# Patient Record
Sex: MALE | Race: WHITE | Employment: OTHER | URBAN - METROPOLITAN AREA
[De-identification: names, ages, dates, MRNs, and addresses within clinical notes are randomized per-mention and may not be internally consistent; named-entity substitution may affect disease eponyms.]

---

## 2017-12-27 ENCOUNTER — HOSPITAL ENCOUNTER (EMERGENCY)
Age: 52
Discharge: HOME | End: 2017-12-27
Attending: EMERGENCY MEDICINE
Payer: MEDICARE

## 2017-12-27 ENCOUNTER — APPOINTMENT (OUTPATIENT)
Dept: GENERAL RADIOLOGY | Age: 52
End: 2017-12-27
Payer: MEDICARE

## 2017-12-27 VITALS
OXYGEN SATURATION: 92 % | SYSTOLIC BLOOD PRESSURE: 137 MMHG | WEIGHT: 160 LBS | HEART RATE: 79 BPM | BODY MASS INDEX: 24.25 KG/M2 | HEIGHT: 68 IN | RESPIRATION RATE: 16 BRPM | TEMPERATURE: 96.8 F | DIASTOLIC BLOOD PRESSURE: 79 MMHG
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
## 2017-12-27 DIAGNOSIS — S20.212A CONTUSION OF LEFT CHEST WALL, INITIAL ENCOUNTER: ICD-10-CM

## 2017-12-27 PROCEDURE — 6370000000 HC RX 637 (ALT 250 FOR IP): Performed by: EMERGENCY MEDICINE

## 2017-12-27 PROCEDURE — 99283 EMERGENCY DEPT VISIT LOW MDM: CPT

## 2017-12-27 PROCEDURE — 71010 XR CHEST PORTABLE: CPT

## 2017-12-27 RX ORDER — ACETAMINOPHEN 500 MG
1000 TABLET ORAL ONCE
Status: COMPLETED | OUTPATIENT
Start: 2017-12-27 | End: 2017-12-27

## 2017-12-27 RX ORDER — ACETAMINOPHEN 500 MG
1000 TABLET ORAL EVERY 8 HOURS PRN
Qty: 20 TABLET | Refills: 0 | Status: SHIPPED | OUTPATIENT
Start: 2017-12-27 | End: 2022-03-17 | Stop reason: SDUPTHER

## 2017-12-27 RX ADMIN — ACETAMINOPHEN 1000 MG: 500 TABLET, FILM COATED ORAL at 15:08

## 2017-12-27 ASSESSMENT — ENCOUNTER SYMPTOMS
PHOTOPHOBIA: 0
NAUSEA: 0
SHORTNESS OF BREATH: 0
EYE PAIN: 0
RHINORRHEA: 0
SORE THROAT: 0
DIARRHEA: 0
BLOOD IN STOOL: 0
ABDOMINAL PAIN: 0
SINUS PRESSURE: 0
VOMITING: 0
COUGH: 0

## 2017-12-27 ASSESSMENT — PAIN DESCRIPTION - ORIENTATION: ORIENTATION: LEFT

## 2017-12-27 ASSESSMENT — PAIN SCALES - GENERAL: PAINLEVEL_OUTOF10: 5

## 2017-12-27 ASSESSMENT — PAIN DESCRIPTION - LOCATION: LOCATION: CHEST;ABDOMEN

## 2017-12-27 NOTE — ED PROVIDER NOTES
[facility]  ED  eMERGENCY dEPARTMENT eNCOUnter   Attending Attestation     Pt Name: Paulo Oleary  MRN: [id number]  [place] 1965  Date of evaluation: 12/27/17       Paulo Oleary is a 45 y.o. male who presents with Fall (Pt in custody of [employer] and fell against the step bumper while getting into a police vehicle.)      History: pt slipped getting in a police [facility] and fell forward on his chest while handcuffed. Pt complains of left chest pain and diffuse abdominal pain. Pt states pain is most severe over the left chest wall. Exam:   Physical Exam   Constitutional: He is oriented to person, place, and time. No distress. HENT:   Head: Normocephalic and atraumatic. Left Ear: External ear normal.   Eyes: EOM are normal. Pupils are equal, round, and reactive to light. Neck: Normal range of motion. Pulmonary/Chest: Breath sounds normal. No respiratory distress. He has no wheezes. He has no rales. He exhibits no tenderness. Abdominal: Soft. Bowel sounds are normal. He exhibits no distension and no mass. There is no tenderness. There is no rebound and no guarding. Patient complains of abdominal pain after I palpated his abdomen and elicit no grimace or wincing or any rebound. Neurological: He is alert and oriented to person, place, and time. GCS score is 15. Skin: Skin is warm and dry. He is not diaphoretic. Will get chest xray for rib fracture and PNX. If negative will discharge to care home With police. I performed a history and physical examination of the patient and discussed management with the resident. I reviewed the residents note and agree with the documented findings and plan of care. Any areas of disagreement are noted on the chart. I was personally present for the key portions of any procedures. I have documented in the chart those procedures where I was not present during the key portions. I have personally reviewed all images and agree with the resident's interpretation.  I
Patient accepted at shift change. The patient's ED course and anticipated disposition was discussed. Relevant labs and other studies were reviewed. Pending diagnostic and therapeutic workup was discussed. Xr Chest Portable    Result Date: 12/27/2017  EXAMINATION: SINGLE VIEW OF THE CHEST 12/27/2017 3:00 pm COMPARISON: 11/08/2015 HISTORY: ORDERING SYSTEM PROVIDED HISTORY: Left sided chest pain after fall, land on flat metal bar, rib pan anteriolaterally. TECHNOLOGIST PROVIDED HISTORY: Reason for exam:->Left sided chest pain after fall, land on flat metal bar, rib pan anteriolaterally. FINDINGS: Cardiothoracic ratio is normal.  There is no pulmonary consolidation, edema, effusion or pneumothorax. Right apical pleural thickening. Prominent central pulmonary arteries. No acute osseous abnormality. Old fracture of posterolateral assessment of a left 8th rib. No acute cardiopulmonary disease.         Renate Mejias MD  12/27/17 5798
products    Home Medications:  Prior to Admission medications    Medication Sig Start Date End Date Taking? Authorizing Provider   acetaminophen (TYLENOL) 500 MG tablet Take 2 tablets by mouth every 8 hours as needed for Pain 12/27/17  Yes Jeremi Powell MD   pravastatin (PRAVACHOL) 40 MG tablet Take 40 mg by mouth daily    Historical Provider, MD   loratadine (CLARITIN) 10 MG tablet Take 10 mg by mouth daily    Historical Provider, MD   sulfamethoxazole-trimethoprim (BACTRIM DS;SEPTRA DS) 800-160 MG per tablet Take 1 tablet by mouth 2 times daily    Historical Provider, MD   Misc. Devices MISC Rx: Please dispense one Cane. 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 Difficulty ambulating  Sig: Use cane as needed to assist in ambulating. 11/9/15   TRISTAN Torres. Devices MISC Please dispense a 30 day supply of the following:  4x4 gauze, Kerlix rolls, and paper tape. 11/9/15   [place] DPM   polyethylene glycol (GLYCOLAX) packet Take 17 g by mouth daily as needed. Historical Provider, MD   senna (SENOKOT) 8.6 MG tablet Take 1 tablet by mouth nightly. Historical Provider, MD   silver sulfADIAZINE (SILVADENE) 1 % cream Apply topically daily. 2/26/13   César Aguirre MD   Gauze Pads & Dressings ([facility]) [address] [facility] by Does not apply route. 2/26/13   César Aguirre MD   doxycycline (VIBRAMYCIN) 100 MG capsule Take 100 mg by mouth 2 times daily. Historical Provider, MD   metoprolol (LOPRESSOR) 25 MG tablet Take 25 mg by mouth 2 times daily. Historical Provider, MD   clopidogrel (PLAVIX) 75 MG tablet Take 75 mg by mouth daily. Historical Provider, MD   aspirin 81 MG tablet Take 81 mg by mouth daily. Historical Provider, MD   oxyCODONE-acetaminophen (PERCOCET) 5-325 MG per tablet Take 1 tablet by mouth. Historical Provider, MD   folic acid (FOLVITE) 1 MG tablet Take 1 mg by mouth daily.     Historical Provider, MD   multivitamin [facility]) per tablet Take 1

## 2017-12-27 NOTE — ED NOTES
Pt fell down landed on left side and chest. Pt was getting arrested. Pt had no LOC. No neck pain. No trauma. Mild pain. Pt show sno distress at bedside.       Zora Ng RN  12/27/17 1527

## 2019-10-19 ENCOUNTER — HOSPITAL ENCOUNTER (EMERGENCY)
Age: 54
Discharge: HOME | End: 2019-10-20
Attending: EMERGENCY MEDICINE
Payer: COMMERCIAL

## 2019-10-19 VITALS
BODY MASS INDEX: 28.25 KG/M2 | TEMPERATURE: 97.2 F | HEART RATE: 65 BPM | RESPIRATION RATE: 16 BRPM | WEIGHT: 180 LBS | OXYGEN SATURATION: 95 % | HEIGHT: 67 IN | DIASTOLIC BLOOD PRESSURE: 88 MMHG | SYSTOLIC BLOOD PRESSURE: 150 MMHG

## 2019-10-19 DIAGNOSIS — F10.920 ALCOHOLIC INTOXICATION WITHOUT COMPLICATION (HCC): Primary | ICD-10-CM

## 2019-10-19 PROCEDURE — 99284 EMERGENCY DEPT VISIT MOD MDM: CPT

## 2019-10-19 ASSESSMENT — ENCOUNTER SYMPTOMS
TROUBLE SWALLOWING: 0
SHORTNESS OF BREATH: 0
COUGH: 0
ABDOMINAL PAIN: 0

## 2020-10-02 ENCOUNTER — NURSE TRIAGE (OUTPATIENT)
Dept: OTHER | Facility: CLINIC | Age: 55
End: 2020-10-02

## 2020-10-02 NOTE — TELEPHONE ENCOUNTER
Reason for Disposition   Swollen tongue or difficulty swallowing    Answer Assessment - Initial Assessment Questions  1. TYPE: \"What type of sting was it? \" (bee, yellow jacket, etc.)       Bee stung him on his tongue while drinking his coffee    2. ONSET: \"When did it occur? \"   Happened about 30 minutes ago    3. LOCATION: \"Where is the sting located? \"  \"How many stings? \"  Stung on tongue    4. SWELLING SIZE: \"How big is the swelling? \" (e.g., inches or cm)  Starting to swell, different when talking    5. REDNESS: \"Is the area red or pink? \" If so, ask \"What size is area of redness? \" (e.g., inches or cm). \"When did the redness start?\"      6. PAIN: \"Is there any pain? \" If so, ask: \"How bad is it? \"  (Scale 1-10; or mild, moderate, severe)  No pain    7. ITCHING: \"Is there any itching? \" If so, ask: \"How bad is it? \"   No ithcing    8. RESPIRATORY DISTRESS: \"Describe your breathing. \"  No breathing concerns    9. PRIOR REACTIONS: \"Have you had any severe allergic reactions to stings in the past?\" if yes, ask: \"What happened? \"    No allergies to bee stings    10. OTHER SYMPTOMS: \"Do you have any other symptoms? \" (e.g., abdominal pain, face or tongue swelling, new rash elsewhere, vomiting)  No hives or rashes    11. PREGNANCY: \"Is there any chance you are pregnant? \" \"When was your last menstrual period? \"  na    Protocols used: BEE OR YELLOW JACKET STING-ADULT-OH  Caller stating he just took a drink of his coffee and got stung by a bee on his tongue. Tongue is starting to swell. He states he feels liek he is talking slightly different. No allergies to bees. Recommend pt call 911.

## 2020-12-10 ENCOUNTER — HOSPITAL ENCOUNTER (EMERGENCY)
Age: 55
Discharge: HOME | End: 2020-12-11
Attending: EMERGENCY MEDICINE
Payer: MEDICARE

## 2020-12-10 ENCOUNTER — APPOINTMENT (OUTPATIENT)
Dept: CT IMAGING | Age: 55
End: 2020-12-10
Payer: MEDICARE

## 2020-12-10 VITALS
DIASTOLIC BLOOD PRESSURE: 95 MMHG | TEMPERATURE: 97.3 F | RESPIRATION RATE: 18 BRPM | HEART RATE: 99 BPM | SYSTOLIC BLOOD PRESSURE: 160 MMHG | OXYGEN SATURATION: 96 %

## 2020-12-10 LAB
ABSOLUTE EOS #: 0.78 K/UL (ref 0–0.44)
ABSOLUTE IMMATURE GRANULOCYTE: 0.04 K/UL (ref 0–0.3)
ABSOLUTE LYMPH #: 3.23 K/UL (ref 1.1–3.7)
ABSOLUTE MONO #: 0.57 K/UL (ref 0.1–1.2)
ANION GAP SERPL CALCULATED.3IONS-SCNC: 13 MMOL/L (ref 9–17)
BASOPHILS # BLD: 1 % (ref 0–2)
BASOPHILS ABSOLUTE: 0.1 K/UL (ref 0–0.2)
BUN BLDV-MCNC: 3 MG/DL (ref 6–20)
BUN/CREAT BLD: ABNORMAL (ref 9–20)
CALCIUM SERPL-MCNC: 9 MG/DL (ref 8.6–10.4)
CHLORIDE BLD-SCNC: 101 MMOL/L (ref 98–107)
CO2: 23 MMOL/L (ref 20–31)
CREAT SERPL-MCNC: 0.45 MG/DL (ref 0.7–1.2)
DIFFERENTIAL TYPE: ABNORMAL
EOSINOPHILS RELATIVE PERCENT: 7 % (ref 1–4)
ETHANOL PERCENT: 0.31 %
ETHANOL: 311 MG/DL
GFR AFRICAN AMERICAN: >60 ML/MIN
GFR NON-AFRICAN AMERICAN: >60 ML/MIN
GFR SERPL CREATININE-BSD FRML MDRD: ABNORMAL ML/MIN/{1.73_M2}
GFR SERPL CREATININE-BSD FRML MDRD: ABNORMAL ML/MIN/{1.73_M2}
GLUCOSE BLD-MCNC: 104 MG/DL (ref 70–99)
HCT VFR BLD CALC: 47.4 % (ref 40.7–50.3)
HEMOGLOBIN: 15.8 G/DL (ref 13–17)
IMMATURE GRANULOCYTES: 0 %
LYMPHOCYTES # BLD: 28 % (ref 24–43)
MCH RBC QN AUTO: 30 PG (ref 25.2–33.5)
MCHC RBC AUTO-ENTMCNC: 33.3 G/DL (ref 28.4–34.8)
MCV RBC AUTO: 90.1 FL (ref 82.6–102.9)
MONOCYTES # BLD: 5 % (ref 3–12)
NRBC AUTOMATED: 0 PER 100 WBC
PDW BLD-RTO: 13.1 % (ref 11.8–14.4)
PLATELET # BLD: 285 K/UL (ref 138–453)
PLATELET ESTIMATE: ABNORMAL
PMV BLD AUTO: 9.2 FL (ref 8.1–13.5)
POTASSIUM SERPL-SCNC: 4.2 MMOL/L (ref 3.7–5.3)
RBC # BLD: 5.26 M/UL (ref 4.21–5.77)
RBC # BLD: ABNORMAL 10*6/UL
SEG NEUTROPHILS: 59 % (ref 36–65)
SEGMENTED NEUTROPHILS ABSOLUTE COUNT: 6.65 K/UL (ref 1.5–8.1)
SODIUM BLD-SCNC: 137 MMOL/L (ref 135–144)
WBC # BLD: 11.4 K/UL (ref 3.5–11.3)
WBC # BLD: ABNORMAL 10*3/UL

## 2020-12-10 PROCEDURE — 85025 COMPLETE CBC W/AUTO DIFF WBC: CPT

## 2020-12-10 PROCEDURE — G0480 DRUG TEST DEF 1-7 CLASSES: HCPCS

## 2020-12-10 PROCEDURE — 80048 BASIC METABOLIC PNL TOTAL CA: CPT

## 2020-12-10 PROCEDURE — 72125 CT NECK SPINE W/O DYE: CPT

## 2020-12-10 PROCEDURE — 70450 CT HEAD/BRAIN W/O DYE: CPT

## 2020-12-10 PROCEDURE — 99285 EMERGENCY DEPT VISIT HI MDM: CPT

## 2020-12-10 RX ORDER — LORAZEPAM 2 MG/ML
1 INJECTION INTRAMUSCULAR ONCE
Status: DISCONTINUED | OUTPATIENT
Start: 2020-12-10 | End: 2020-12-11 | Stop reason: HOSPADM

## 2020-12-10 RX ORDER — HALOPERIDOL 5 MG/ML
5 INJECTION INTRAMUSCULAR ONCE
Status: DISCONTINUED | OUTPATIENT
Start: 2020-12-10 | End: 2020-12-11 | Stop reason: HOSPADM

## 2020-12-10 RX ORDER — DIPHENHYDRAMINE HYDROCHLORIDE 50 MG/ML
25 INJECTION INTRAMUSCULAR; INTRAVENOUS EVERY 6 HOURS PRN
Status: DISCONTINUED | OUTPATIENT
Start: 2020-12-10 | End: 2020-12-11 | Stop reason: HOSPADM

## 2020-12-10 NOTE — ED NOTES
Pt is now calm and cooperative. Medications not administered at this time.       Army Levi RN  12/10/20 3946

## 2020-12-10 NOTE — ED NOTES
Pt is in the [facility] screaming, yellow, removed his IV. Pt is demanding a beer and a cigarette. Pt is not re-directable at this time. Dr. Lashonda Hodges notified. Security at bedside.           Deidre Diaz RN  12/10/20 6939

## 2020-12-10 NOTE — ED PROVIDER NOTES
[facility]  Emergency Department  Faculty Attestation     I performed a history and physical examination of the patient and discussed management with the resident. I reviewed the residents note and agree with the documented findings and plan of care. Any areas of disagreement are noted on the chart. I was personally present for the key portions of any procedures. I have documented in the chart those procedures where I was not present during the key portions. I have reviewed the emergency nurses triage note. I agree with the chief complaint, past medical history, past surgical history, allergies, medications, social and family history as documented unless otherwise noted below. For Physician Assistant/ Nurse Practitioner cases/documentation I have personally evaluated this patient and have completed at least one if not all key elements of the E/M (history, physical exam, and MDM). Additional findings are as noted. Primary Care Physician:  No primary care provider on file. Screenings:  [unfilled]    CHIEF COMPLAINT       Chief Complaint   Patient presents with    Alcohol Problem     daily drinker, smells of alcohol, admits to drinking today    Fall     pt fell several days ago he thinks. abrasion to the left eye. unsure if he had +loc at the time. pt states he drinks etoh daily and was probably drunk and fell. RECENT VITALS:   Temp: 97.3 °F (36.3 °C),  Pulse: 99, Resp: 18, BP: (!) 160/95    LABS:  Labs Reviewed   CBC WITH AUTO DIFFERENTIAL   BASIC METABOLIC PANEL   ETHANOL       Radiology  CT HEAD WO CONTRAST    (Results Pending)   CT CERVICAL SPINE WO CONTRAST    (Results Pending)         EKG:      Attending Physician Additional  Notes  Patient is brought by EMS for public intoxication, deferred by police who were also notified. Patient is highly intoxicated and unable to provide any history. He has abrasion on his left zygoma.   He is uncertain whether he had a seizure, head injury or simply a fall. He states he is no longer taking Plavix though this is on his medication list.  He is unable to tell whether he has headache neck pain chest pain abdominal pain. Unable to tell whether he has any neurologic deficits. On exam he is GCS of 14, vital signs are elevated blood pressure, top normal heart rate. Neck is supple and full range of movement. Normal pupils. Face is symmetrical.  He moves all extremities. He did urinate on himself the bed in his clothing but does not recall this. He is easily redirected and not overtly violent. Impression is intoxication, facial abrasion. Plan is thiamine, CT head neck, laboratory studies, reassess when sober. Hemanth Varela MD, [facility]  Attending Emergency  Physician               Karla Kelley MD  12/10/20 2134
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
## 2020-12-11 NOTE — ED PROVIDER NOTES
Faculty Sign-Out Attestation  Handoff taken on the following patient from prior Attending Physician: Amanda Kang    I was available and discussed any additional care issues that arose and coordinated the management plans with the resident(s) caring for the patient during my duty period. Any areas of disagreement with residents documentation of care or procedures are noted on the chart. I was personally present for the key portions of any/all procedures during my duty period. I have documented in the chart those procedures where I was not present during the key portions.     etoh 300, had benadryl / ativan / haldol, ct head / neck -,   Re eval, + /-,     Quinton Watts,   Attending Physician     Quinton Watts, DO  12/10/20 2301  Will dc home, talkative, ambulatory, tolerating liquids,      Quinton Watts,   12/11/20 0011

## 2020-12-11 NOTE — ED NOTES
[facility] provided [facility] with community resources to assist with social needs     Yevgeniy Juan  12/10/20 2560

## 2022-03-17 ENCOUNTER — APPOINTMENT (OUTPATIENT)
Dept: GENERAL RADIOLOGY | Age: 57
End: 2022-03-17
Payer: MEDICARE

## 2022-03-17 ENCOUNTER — HOSPITAL ENCOUNTER (EMERGENCY)
Age: 57
Discharge: HOME | End: 2022-03-17
Attending: EMERGENCY MEDICINE
Payer: MEDICARE

## 2022-03-17 VITALS
SYSTOLIC BLOOD PRESSURE: 163 MMHG | OXYGEN SATURATION: 95 % | TEMPERATURE: 97.7 F | RESPIRATION RATE: 20 BRPM | DIASTOLIC BLOOD PRESSURE: 86 MMHG | HEART RATE: 93 BPM

## 2022-03-17 DIAGNOSIS — M86.9 OSTEOMYELITIS OF RIGHT FOOT, UNSPECIFIED TYPE (HCC): Primary | ICD-10-CM

## 2022-03-17 DIAGNOSIS — I73.9 PAD (PERIPHERAL ARTERY DISEASE) (HCC): ICD-10-CM

## 2022-03-17 LAB
ABSOLUTE EOS #: 0.43 K/UL (ref 0–0.44)
ABSOLUTE IMMATURE GRANULOCYTE: 0.04 K/UL (ref 0–0.3)
ABSOLUTE LYMPH #: 3.47 K/UL (ref 1.1–3.7)
ABSOLUTE MONO #: 0.65 K/UL (ref 0.1–1.2)
ANION GAP SERPL CALCULATED.3IONS-SCNC: 15 MMOL/L (ref 9–17)
BASOPHILS # BLD: 1 % (ref 0–2)
BASOPHILS ABSOLUTE: 0.08 K/UL (ref 0–0.2)
BUN BLDV-MCNC: 3 MG/DL (ref 6–20)
C-REACTIVE PROTEIN: 7.6 MG/L (ref 0–5)
CALCIUM SERPL-MCNC: 9.2 MG/DL (ref 8.6–10.4)
CHLORIDE BLD-SCNC: 95 MMOL/L (ref 98–107)
CO2: 22 MMOL/L (ref 20–31)
CREAT SERPL-MCNC: 0.57 MG/DL (ref 0.7–1.2)
EOSINOPHILS RELATIVE PERCENT: 4 % (ref 1–4)
GFR AFRICAN AMERICAN: >60 ML/MIN
GFR NON-AFRICAN AMERICAN: >60 ML/MIN
GFR SERPL CREATININE-BSD FRML MDRD: ABNORMAL ML/MIN/{1.73_M2}
GLUCOSE BLD-MCNC: 94 MG/DL (ref 70–99)
HCT VFR BLD CALC: 44.4 % (ref 40.7–50.3)
HEMOGLOBIN: 14.7 G/DL (ref 13–17)
IMMATURE GRANULOCYTES: 0 %
LYMPHOCYTES # BLD: 31 % (ref 24–43)
MCH RBC QN AUTO: 29.9 PG (ref 25.2–33.5)
MCHC RBC AUTO-ENTMCNC: 33.1 G/DL (ref 28.4–34.8)
MCV RBC AUTO: 90.4 FL (ref 82.6–102.9)
MONOCYTES # BLD: 6 % (ref 3–12)
NRBC AUTOMATED: 0 PER 100 WBC
PDW BLD-RTO: 13.5 % (ref 11.8–14.4)
PLATELET # BLD: 398 K/UL (ref 138–453)
PMV BLD AUTO: 9.3 FL (ref 8.1–13.5)
POTASSIUM SERPL-SCNC: 3.8 MMOL/L (ref 3.7–5.3)
RBC # BLD: 4.91 M/UL (ref 4.21–5.77)
SEDIMENTATION RATE, ERYTHROCYTE: 35 MM/HR (ref 0–20)
SEG NEUTROPHILS: 58 % (ref 36–65)
SEGMENTED NEUTROPHILS ABSOLUTE COUNT: 6.5 K/UL (ref 1.5–8.1)
SODIUM BLD-SCNC: 132 MMOL/L (ref 135–144)
WBC # BLD: 11.2 K/UL (ref 3.5–11.3)

## 2022-03-17 PROCEDURE — 73630 X-RAY EXAM OF FOOT: CPT

## 2022-03-17 PROCEDURE — 96374 THER/PROPH/DIAG INJ IV PUSH: CPT

## 2022-03-17 PROCEDURE — 86140 C-REACTIVE PROTEIN: CPT

## 2022-03-17 PROCEDURE — 85025 COMPLETE CBC W/AUTO DIFF WBC: CPT

## 2022-03-17 PROCEDURE — 80048 BASIC METABOLIC PNL TOTAL CA: CPT

## 2022-03-17 PROCEDURE — 99285 EMERGENCY DEPT VISIT HI MDM: CPT

## 2022-03-17 PROCEDURE — 85652 RBC SED RATE AUTOMATED: CPT

## 2022-03-17 PROCEDURE — 6360000002 HC RX W HCPCS: Performed by: STUDENT IN AN ORGANIZED HEALTH CARE EDUCATION/TRAINING PROGRAM

## 2022-03-17 PROCEDURE — 96375 TX/PRO/DX INJ NEW DRUG ADDON: CPT

## 2022-03-17 RX ORDER — MORPHINE SULFATE 4 MG/ML
2 INJECTION, SOLUTION INTRAMUSCULAR; INTRAVENOUS ONCE
Status: COMPLETED | OUTPATIENT
Start: 2022-03-17 | End: 2022-03-17

## 2022-03-17 RX ORDER — ACETAMINOPHEN 500 MG
1000 TABLET ORAL EVERY 8 HOURS PRN
Qty: 20 TABLET | Refills: 0 | Status: ON HOLD | OUTPATIENT
Start: 2022-03-17 | End: 2022-06-27 | Stop reason: HOSPADM

## 2022-03-17 RX ORDER — IBUPROFEN 800 MG/1
800 TABLET ORAL EVERY 8 HOURS PRN
Qty: 30 TABLET | Refills: 0 | Status: ON HOLD | OUTPATIENT
Start: 2022-03-17 | End: 2022-06-27 | Stop reason: HOSPADM

## 2022-03-17 RX ORDER — OXYCODONE HYDROCHLORIDE 5 MG/1
5 TABLET ORAL EVERY 6 HOURS PRN
Qty: 3 TABLET | Refills: 0 | Status: SHIPPED | OUTPATIENT
Start: 2022-03-17 | End: 2022-03-20

## 2022-03-17 RX ORDER — KETOROLAC TROMETHAMINE 30 MG/ML
30 INJECTION, SOLUTION INTRAMUSCULAR; INTRAVENOUS ONCE
Status: COMPLETED | OUTPATIENT
Start: 2022-03-17 | End: 2022-03-17

## 2022-03-17 RX ADMIN — MORPHINE SULFATE 2 MG: 4 INJECTION INTRAVENOUS at 16:34

## 2022-03-17 RX ADMIN — KETOROLAC TROMETHAMINE 30 MG: 30 INJECTION, SOLUTION INTRAMUSCULAR at 16:33

## 2022-03-17 ASSESSMENT — ENCOUNTER SYMPTOMS
COLOR CHANGE: 1
SHORTNESS OF BREATH: 0

## 2022-03-17 ASSESSMENT — PAIN SCALES - GENERAL: PAINLEVEL_OUTOF10: 10

## 2022-03-17 NOTE — ED PROVIDER NOTES
[facility] ED  Emergency Department Encounter  Emergency Medicine Resident     Pt Name: Princess Layton  MRN: [id number]  [place] 1965  Date of evaluation: 3/17/22  PCP:  No primary care provider on file. CHIEF COMPLAINT       Chief Complaint   Patient presents with    Foot Pain       HISTORY [facility]  (Location/Symptom, Timing/Onset, Context/Setting, Quality, Duration, Modifying Factors,Severity.)      Princess Layton is a 60 y.o. male who presents with concerns for acute on chronic right-sided foot pain. Patient does have a history of frostbite couple of months ago for which patient had amputation of the distal toes in the left lower extremity at the foot. Patient denies any repeat incidence of frostbite, does state that he has worsening toe pain made worse with exertion, ambulation, does have a history of coronary artery disease and hypertension with concerns for potential vascular disease. Patient has intact sensation of the time to the dorsal aspect of the foot, has no sensation to the area of the second digit, denies fever, chills, nausea, vomiting, denies streaking of pain up the extremity, does state that his foot pain is made worse with exertion, denies relieving factors. PAST MEDICAL / SURGICAL / SOCIAL / FAMILY HISTORY      has a past medical history of Alcohol abuse, CAD (coronary artery disease), Convulsions ([address] Frostbite, and Hypertension. has a past surgical history that includes Toe amputation. Social History     Socioeconomic History    Marital status:      Spouse name: Not on file    Number of children: Not on file    Years of education: Not on file    Highest education level: Not on file   Occupational History    Not on file   Tobacco Use    Smoking status: Current Every Day Smoker     Packs/day: 1.00     Types: Cigarettes    Smokeless tobacco: Never Used   Substance and Sexual Activity    Alcohol use:  Yes     Alcohol/week: 6.0 standard drinks     Types: 6 Cans of beer per week     Comment: approx 12 pack daily    Drug use: No    Sexual activity: Not on file   Other Topics Concern    Not on file   Social History Narrative    Not on file     Social Determinants of Health     Financial Resource Strain:     Difficulty of Paying Living Expenses: Not on file   Food Insecurity:     Worried About Running Out of Food in the Last Year: Not on file    Rosy of Food in the Last Year: Not on file   Transportation Needs:     Lack of Transportation (Medical): Not on file    Lack of Transportation (Non-Medical): Not on file   Physical Activity:     Days of Exercise per Week: Not on file    Minutes of Exercise per Session: Not on file   Stress:     Feeling of Stress : Not on file   Social Connections:     Frequency of Communication with Friends and Family: Not on file    Frequency of Social Gatherings with Friends and Family: Not on file    Attends Taoist Services: Not on file    Active Member of [address] [place] [address] or Organizations: Not on file    Attends Club or Organization Meetings: Not on file    Marital Status: Not on file   Intimate Partner Violence:     Fear of Current or Ex-Partner: Not on file    Emotionally Abused: Not on file    Physically Abused: Not on file    Sexually Abused: Not on file   Housing Stability:     Unable to Pay for Housing in the Last Year: Not on file    Number of [place] in the Last Year: Not on file    Unstable Housing in the Last Year: Not on file       Family History   Problem Relation Age of Onset    Cancer Mother     Heart Disease Father         Allergies:  Shellfish-derived products    Home Medications:  Prior to Admission medications    Medication Sig Start Date End Date Taking?  Authorizing Provider   ibuprofen (ADVIL;MOTRIN) 800 MG tablet Take 1 tablet by mouth every 8 hours as needed for Pain 3/17/22  Yes Jackie Thomas MD   acetaminophen (TYLENOL) 500 MG tablet Take 2 tablets by mouth every 8 hours as needed for Pain 3/17/22  Yes Reagan Silva MD   pravastatin (PRAVACHOL) 40 MG tablet Take 40 mg by mouth daily    Historical Provider, MD   loratadine (CLARITIN) 10 MG tablet Take 10 mg by mouth daily    Historical Provider, MD   sulfamethoxazole-trimethoprim (BACTRIM DS;SEPTRA DS) 800-160 MG per tablet Take 1 tablet by mouth 2 times daily    Historical Provider, MD   Misc. Devices MISC Rx: Please dispense one Cane. 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 Difficulty ambulating  Sig: Use cane as needed to assist in ambulating. 11/9/15   Murleen Braddock, DPM   Misc. Devices MISC Please dispense a 30 day supply of the following:  4x4 gauze, Kerlix rolls, and paper tape. 11/9/15   Murleen Braddock, DPM   polyethylene glycol (GLYCOLAX) packet Take 17 g by mouth daily as needed. Historical Provider, MD   senna (SENOKOT) 8.6 MG tablet Take 1 tablet by mouth nightly. Historical Provider, MD   silver sulfADIAZINE (SILVADENE) 1 % cream Apply topically daily. 2/26/13   Miguelito Cook MD   Gauze Pads & Dressings ([facility]) [address] [facility] by Does not apply route. 2/26/13   Miguelito Cook MD   doxycycline (VIBRAMYCIN) 100 MG capsule Take 100 mg by mouth 2 times daily. Historical Provider, MD   metoprolol (LOPRESSOR) 25 MG tablet Take 25 mg by mouth 2 times daily. Historical Provider, MD   clopidogrel (PLAVIX) 75 MG tablet Take 75 mg by mouth daily. Historical Provider, MD   aspirin 81 MG tablet Take 81 mg by mouth daily. Historical Provider, MD   oxyCODONE-acetaminophen (PERCOCET) 5-325 MG per tablet Take 1 tablet by mouth. Historical Provider, MD   folic acid (FOLVITE) 1 MG tablet Take 1 mg by mouth daily. Historical Provider, MD   multivitamin [facility]) per tablet Take 1 tablet by mouth daily. Historical Provider, MD   thiamine (B-1) 100 MG/ML injection Infuse 100 mg intravenously daily.     Historical Provider, MD   simvastatin (ZOCOR) 10 MG tablet Take 10 mg by mouth nightly. Historical Provider, MD   nicotine (NICODERM CQ) 14 MG/24HR Place 1 patch onto the skin every 24 hours. Historical Provider, MD       REVIEW OFSYSTEMS    (2-9 systems for level 4, 10 or more for level 5)      Review of Systems   Constitutional: Negative for chills, diaphoresis, fatigue and fever. HENT: Negative for rhinorrhea, sore throat, tinnitus and trouble swallowing. Eyes: Negative for visual disturbance. Respiratory: Negative for cough, chest tightness, shortness of breath and wheezing. Cardiovascular: Negative for chest pain and leg swelling. Gastrointestinal: Negative for abdominal distention, abdominal pain, constipation, diarrhea, nausea and vomiting. Endocrine: Negative for polyuria. Genitourinary: Negative for dysuria, flank pain and frequency. Musculoskeletal: Positive for arthralgias and myalgias. Negative for back pain and joint swelling. Neurological: Negative for dizziness, tremors, seizures, weakness, light-headedness, numbness and headaches. PHYSICAL EXAM   (up to 7 for level 4, 8 or more forlevel 5)      INITIAL VITALS:   ED Triage Vitals   BP Temp Temp src Pulse Resp SpO2 Height Weight   -- -- -- -- -- -- -- --       Physical Exam  Constitutional:       General: He is not in acute distress. Appearance: He is not ill-appearing. HENT:      Head: Normocephalic and atraumatic. Right Ear: External ear normal.      Left Ear: External ear normal.   Eyes:      Extraocular Movements: Extraocular movements intact. Cardiovascular:      Rate and Rhythm: Normal rate and regular rhythm. Pulmonary:      Effort: Pulmonary effort is normal.   Abdominal:      General: Abdomen is flat. Musculoskeletal:         General: Tenderness and deformity present. No signs of injury. Comments: See picture    Skin:     General: Skin is warm. Capillary Refill: Capillary refill takes less than 2 seconds.    Neurological:      Mental Status: He is oriented to person, place, and time. Mental status is at baseline. Psychiatric:         Mood and Affect: Mood normal.                 DIFFERENTIAL  DIAGNOSIS     PLAN (LABS / IMAGING / EKG):  Orders Placed This Encounter   Procedures    XR FOOT RIGHT (MIN 3 VIEWS)    VL LOWER EXTREMITY ARTERIAL SEGMENTAL PRESSURES W PPG    Sedimentation Rate    C-Reactive Protein    CBC with Auto Differential    Basic Metabolic Panel    Inpatient consult to Podiatry       MEDICATIONS ORDERED:  Orders Placed This Encounter   Medications    morphine injection 2 mg    ketorolac (TORADOL) injection 30 mg    ibuprofen (ADVIL;MOTRIN) 800 MG tablet     Sig: Take 1 tablet by mouth every 8 hours as needed for Pain     Dispense:  30 tablet     Refill:  0    acetaminophen (TYLENOL) 500 MG tablet     Sig: Take 2 tablets by mouth every 8 hours as needed for Pain     Dispense:  20 tablet     Refill:  0    oxyCODONE (ROXICODONE) 5 MG immediate release tablet     Sig: Take 1 tablet by mouth every 6 hours as needed for Pain for up to 3 days. Intended supply: 3 days. Take lowest dose possible to manage pain     Dispense:  3 tablet     Refill:  0       DDX: Osteomyelitis, frostbite, vascular disease    Initial MDM/Plan/ED COURSE:    60 y.o. male presents with concerns for acute on chronic right-sided foot pain. Patient does have a history of frostbite couple of months ago for which patient had amputation of the distal toes in the left lower extremity at the foot. Patient denies any repeat incidence of frostbite, does state that he has worsening toe pain made worse with exertion, ambulation, does have a history of coronary artery disease and hypertension with concerns for potential vascular disease.   Patient has intact sensation of the time to the dorsal aspect of the foot, has no sensation to the area of the second digit, denies fever, chills, nausea, vomiting, denies streaking of pain up the extremity, does state that his foot pain is made worse with exertion, denies relieving factors. Plan to consult podiatry, plan to get a CRP, ESR with concerns for potential osteomyelitis      ED Course as of 03/23/22 1415   Thu Mar 17, 2022   1708   IMPRESSION:  Cannot exclude osteomyelitis 2nd distal phalanx. Soft tissue swelling. Triple phase bone scan or MRI with and without contrast may be helpful. [GP]   1711 CRP(!): 7.6 [GP]      ED Course User Index  [GP] MD Dr. Jonah Soria evaluated patient, stated they will follow up with patient on an outpatient basis, patient with ibuprofen as well as Roxicodone for management of pain. Encouraged to follow-up with podiatry first thing tomorrow.:     DIAGNOSTIC RESULTS / EMERGENCYDEPARTMENT COURSE / MDM     LABS:  Labs Reviewed   SEDIMENTATION RATE - Abnormal; Notable for the following components:       Result Value    Sed Rate 35 (*)     All other components within normal limits   C-REACTIVE PROTEIN - Abnormal; Notable for the following components:    CRP 7.6 (*)     All other components within normal limits   BASIC METABOLIC PANEL - Abnormal; Notable for the following components:    BUN 3 (*)     CREATININE 0.57 (*)     Sodium 132 (*)     Chloride 95 (*)     All other components within normal limits   CBC WITH AUTO DIFFERENTIAL           No results found. PROCEDURES:  None    CONSULTS:  IP CONSULT TO PODIATRY    CRITICAL CARE:  Please see attending note    FINAL IMPRESSION      1. Osteomyelitis of right foot, unspecified type ([address]    2. PAD (peripheral artery disease) [facility])          DISPOSITION / PLAN     DISPOSITION Decision To Discharge 03/17/2022 06:13:23 PM      PATIENT REFERRED TO:  [facility]  [address] [facility]  [phone]    As needed    [address]  [address]  [address] [facility] [address] [place] [address]  [phone]  Schedule an appointment as soon as possible for a visit   Dry gangrene 2nd digit right foot.     [facility]  [address] [facility] [address]  [phone]  Schedule an appointment as soon as possible for a visit   PAD      DISCHARGE MEDICATIONS:  Discharge Medication List as of 3/17/2022  6:30 PM      START taking these medications    Details   ibuprofen (ADVIL;MOTRIN) 800 MG tablet Take 1 tablet by mouth every 8 hours as needed for Pain, Disp-30 tablet, R-0Print      oxyCODONE (ROXICODONE) 5 MG immediate release tablet Take 1 tablet by mouth every 6 hours as needed for Pain for up to 3 days. Intended supply: 3 days.  Take lowest dose possible to manage pain, Disp-3 tablet, R-0Print             Jenniffer Benjamin MD  Emergency Medicine Resident    (Please note that portions of this note were completed with a voice recognition program.Efforts were made to edit the dictations but occasionally words are mis-transcribed.)        Jenniffer Benjamin MD  Resident  03/23/22 [id number]

## 2022-03-17 NOTE — ED NOTES
Pt resting on stretcher with eyes closed, RR even and non labored, attached to monitor, call light in reach      Bri Carson RN  03/17/22 3520

## 2022-03-17 NOTE — ED PROVIDER NOTES
[facility] ED     Emergency Department     Faculty Attestation    I performed a history and physical examination of the patient and discussed management with the resident. I reviewed the residents note and agree with the documented findings and plan of care. Any areas of disagreement are noted on the chart. I was personally present for the key portions of any procedures. I have documented in the chart those procedures where I was not present during the key portions. I have reviewed the emergency nurses triage note. I agree with the chief complaint, past medical history, past surgical history, allergies, medications, social and family history as documented unless otherwise noted below. For Physician Assistant/ Nurse Practitioner cases/documentation I have personally evaluated this patient and have completed at least one if not all key elements of the E/M (history, physical exam, and MDM). Additional findings are as noted. This patient was evaluated in the Emergency Department for symptoms described in the history of present illness. He/she was evaluated in the context of the global COVID-19 pandemic, which necessitated consideration that the patient might be at risk for infection with the SARS-CoV-2 virus that causes COVID-19. Institutional protocols and algorithms that pertain to the evaluation of patients at risk for COVID-19 are in a state of rapid change based on information released by regulatory bodies including the CDC and federal and state organizations. These policies and algorithms were followed during the patient's care in the ED. Patient here with right foot pain history of frostbite several months ago, has had left toes amputated is concerned he may need some toes amputated on his right. No injury or trauma he can recall other than the frostbite. On exam black second and fourth toe with erythema edema on the dorsal aspect of the foot. Foot however still pink and warm.   No calf tenderness no edema in the leg.   Will image, labs, talk to podiatry      Critical Care     none    Linda Reid MD, Lesly Salinas  Attending Emergency  Physician             Linda Reid MD  03/17/22 [id number]

## 2022-03-17 NOTE — ED NOTES
----- Message from Alva Elizondo MD sent at 12/6/2017  9:27 AM CST -----  Please inform:  Hb A1C is normal (gives us a sense of what his average blood sugar levels have been in the past 3 months)  However, his insulin level is high (Julio C's is 51, and ideally in children it should be under 15).  What this means is that his pancreas is working overtime to produce enough insulin in order to keep his blood sugar levels low. This is unhealthy, and is what we see before type 2 DM develops.  His Vit D level is WNL, but low end of normal.  I recommend he take Vit D 800-1000 IU / day - always.  I would really like Julio C to see the nutritionist for directed education on healthy eating and personalized goals to decrease his risk of developing diabetes and treating his dylipidemia with diet.  I know that the nutritionist has contacted them w/o reply.  Also, if he's not taking his fish oil, he should restart that to help with his high TG.  Please remind that I want to see him in the next 4 weeks to review his lab abnormalities with him and f/u the change in his zoloft.  We also must recheck his labs in 3-4 months, and make sure they're not getting worse.   Patient in need of a ride home from the ER. Patient has [employer] in place and they do not provide medical cabs. He has [place], but has not applied for [place] [facility]. SW will provide a one-time cab ride home for patient since he has not had one previously and patient understands he will need to get his own ride home from the ER in the future. LULA set up a [address] for patient. Boyd Neely.  [place], [place]  03/17/22 6402

## 2022-03-17 NOTE — ED NOTES
Pt to ED via self. Pt c/o left foot pain. Pt states hx of all 5 toes on right foot, and left great big toe amputation d/t frostbite approx 2 years ago. Noted necrosis on left second toe digit. States no feeling, only pain. States he takes extra strength tylenol at home with no relief.  Pt is a/o, ambulatory, RR even and non labored, attached to monitor, call light in reach     Noemy Correa RN  03/17/22 [id number]

## 2022-03-17 NOTE — ED NOTES
The following labs labeled with pt sticker and tubed to lab:     [] Blue     [x] Lavender   [] on ice  [x] Green/yellow  [] Green/black [] on ice  [] Yellow  [] Red  [] Pink      [] COVID-19 swab    [] Rapid  [] PCR  [] Flu swab  [] Peds Viral Panel     [] Urine Sample  [] Pelvic Cultures  [] Blood Cultures            Sara Jimenez RN  03/17/22 7565

## 2022-03-17 NOTE — CONSULTS
Consultation Note  Podiatric Medicine and Surgery     Subjective     Chief Complaint: right 2nd toe pain    HPI:  Elva Angelo is a 60 y.o. male seen at [facility] emergency department with complaints of right foot pain. Patient reports that he is has a history of hypertension all of his toes to his left foot following frostbite. Patient states that his right second toe has been discolored with worsening pain recently. Patient is a smoking, states that the pain in his right toe is worse after he smokes. Patient denies any nausea vomiting fever chills, patient has no other pedal complaints. PCP is No primary care provider on file. ROS:   Review of Systems   Constitutional: Negative for fatigue and fever. Respiratory: Negative for shortness of breath. Musculoskeletal: Positive for arthralgias, gait problem and myalgias. Skin: Positive for color change and wound. Past Medical History   has a past medical history of Alcohol abuse, CAD (coronary artery disease), Convulsions ([address] Frostbite, and Hypertension. Past Surgical History   has a past surgical history that includes Toe amputation. Medications  Prior to Admission medications    Medication Sig Start Date End Date Taking? Authorizing Provider   acetaminophen (TYLENOL) 500 MG tablet Take 2 tablets by mouth every 8 hours as needed for Pain 12/27/17   Peggy Chavez MD   pravastatin (PRAVACHOL) 40 MG tablet Take 40 mg by mouth daily    Historical Provider, MD   loratadine (CLARITIN) 10 MG tablet Take 10 mg by mouth daily    Historical Provider, MD   sulfamethoxazole-trimethoprim (BACTRIM DS;SEPTRA DS) 800-160 MG per tablet Take 1 tablet by mouth 2 times daily    Historical Provider, MD   Misc. Devices MISC Rx: Please dispense one Cane. 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 Difficulty ambulating  Sig: Use cane as needed to assist in ambulating. 11/9/15   eClsa Hayes DPM   Misc.  Devices MISC Please dispense a 30 day supply of the following:  4x4 gauze, Kerlix rolls, and paper tape. 11/9/15   Jolynn Dodd, DPJUAN PABLO   polyethylene glycol (GLYCOLAX) packet Take 17 g by mouth daily as needed. Historical Provider, MD   senna (SENOKOT) 8.6 MG tablet Take 1 tablet by mouth nightly. Historical Provider, MD   silver sulfADIAZINE (SILVADENE) 1 % cream Apply topically daily. 2/26/13   Jamey Tee MD   Gauze Pads & Dressings ([facility]) [address] [facility] by Does not apply route. 2/26/13   Jamey Tee MD   doxycycline (VIBRAMYCIN) 100 MG capsule Take 100 mg by mouth 2 times daily. Historical Provider, MD   metoprolol (LOPRESSOR) 25 MG tablet Take 25 mg by mouth 2 times daily. Historical Provider, MD   clopidogrel (PLAVIX) 75 MG tablet Take 75 mg by mouth daily. Historical Provider, MD   aspirin 81 MG tablet Take 81 mg by mouth daily. Historical Provider, MD   oxyCODONE-acetaminophen (PERCOCET) 5-325 MG per tablet Take 1 tablet by mouth. Historical Provider, MD   folic acid (FOLVITE) 1 MG tablet Take 1 mg by mouth daily. Historical Provider, MD   multivitamin [facility]) per tablet Take 1 tablet by mouth daily. Historical Provider, MD   thiamine (B-1) 100 MG/ML injection Infuse 100 mg intravenously daily. Historical Provider, MD   simvastatin (ZOCOR) 10 MG tablet Take 10 mg by mouth nightly. Historical Provider, MD   nicotine (NICODERM CQ) 14 MG/24HR Place 1 patch onto the skin every 24 hours. Historical Provider, MD    Scheduled Meds:  Continuous Infusions:  PRN Meds:. Allergies  is allergic to shellfish-derived products. Family History  family history includes Cancer in his mother; Heart Disease in his father. Social History   reports that he has been smoking cigarettes. He has been smoking about 1.00 pack per day. He has never used smokeless tobacco.   reports current alcohol use of about 6.0 standard drinks of alcohol per week. reports no history of drug use.     Objective Vitals:  Patient Vitals for the past 8 hrs:   BP Temp Temp src Pulse Resp SpO2   22 1606 (!) 163/86 97.7 °F (36.5 °C) Oral 93 20 95 %     Average, Min, and Max for last 24 hours Vitals:  TEMPERATURE:  Temp  Av.7 °F (36.5 °C)  Min: 97.7 °F (36.5 °C)  Max: 97.7 °F (36.5 °C)    RESPIRATIONS RANGE: Resp  Av  Min: 20  Max: 20    PULSE RANGE: Pulse  Av  Min: 93  Max: 93    BLOOD PRESSURE RANGE:  Systolic (44PQG), KUI:065 , Min:163 , CFL:535   ; Diastolic (12JND), SRD:70, Min:86, Max:86      PULSE OXIMETRY RANGE: SpO2  Av %  Min: 95 %  Max: 95 %  I&O:  No intake/output data recorded. CBC:  Recent Labs     22  1636   WBC 11.2   HGB 14.7   HCT 44.4      CRP 7.6*        BMP:  Recent Labs     22  1636   *   K 3.8   CL 95*   CO2 22   BUN 3*   CREATININE 0.57*   GLUCOSE 94   CALCIUM 9.2        Coags:  No results for input(s): APTT, PROT, INR in the last 72 hours. No results found for: LABA1C  Lab Results   Component Value Date    SEDRATE 32 (H) 2015     Lab Results   Component Value Date    CRP 7.6 (H) 2022         Lower Extremity Physical Exam:  Vascular: DP and PT pulses are palpable. CFT >5 seconds to all digits. Hair growth is absent to the level of the digits. no edema. Neuro: Saph/sural/SP/DP/plantar sensation diminished to light touch. Musculoskeletal: Muscle strength is 4/5 to all lower extremity muscle groups. Gross deformity is partial amputation of right hallux. Transmetatarsal Amputation ,left. Dermatologic: Ischemic changes to right second digit. Digits 4 and 5 with blue ischemic discoloration. No purulence appreciated, no foul odor appreciated. No associated increase in warmth. Clinical Images:              Imaging:   XR FOOT RIGHT (MIN 3 VIEWS)   Final Result   Cannot exclude osteomyelitis 2nd distal phalanx. Soft tissue swelling. Triple phase bone scan or MRI with and without contrast may be helpful.              Cultures: none    Assessment     Verner Stabile is a 60 y.o. male with   1. Dry gangrene, 2nd digit right foot  2. PAD right foot  3. S/p TMA, left. Active Problems:    * No active hospital problems. *  Resolved Problems:    * No resolved hospital problems. *        Plan     · Patient examined and evaluated at bedside   · Radiographs reviewed, no acute signs of osteomyelitis or soft tissue emphysema. · Physical exam findings are consisted with chronic vascular disease and dry gangrene to the right 2nd digit. · Lab markers not concerning for active infection. · Patient will likely need digital amputation which can be done outpatient. · Referral to vascular surgery. · Follow-up in [facility] podiatry clinic. · Discussed with Dr. Juan Ricks.       Nikki James DPM   Podiatric Medicine & Surgery   3/17/2022 at 6:10 PM

## 2022-03-23 ASSESSMENT — ENCOUNTER SYMPTOMS
COUGH: 0
CHEST TIGHTNESS: 0
RHINORRHEA: 0
NAUSEA: 0
DIARRHEA: 0
SHORTNESS OF BREATH: 0
VOMITING: 0
ABDOMINAL PAIN: 0
BACK PAIN: 0
WHEEZING: 0
CONSTIPATION: 0
TROUBLE SWALLOWING: 0
ABDOMINAL DISTENTION: 0
SORE THROAT: 0

## 2022-04-08 ENCOUNTER — OFFICE VISIT (OUTPATIENT)
Dept: PODIATRY | Age: 57
End: 2022-04-08
Payer: MEDICARE

## 2022-04-08 VITALS
WEIGHT: 174 LBS | BODY MASS INDEX: 26.37 KG/M2 | HEART RATE: 114 BPM | SYSTOLIC BLOOD PRESSURE: 168 MMHG | DIASTOLIC BLOOD PRESSURE: 86 MMHG | HEIGHT: 68 IN

## 2022-04-08 DIAGNOSIS — T34.90XD: Primary | ICD-10-CM

## 2022-04-08 DIAGNOSIS — I96 GANGRENE OF TOE OF RIGHT FOOT (HCC): ICD-10-CM

## 2022-04-08 DIAGNOSIS — I73.9 PERIPHERAL VASCULAR DISEASE (HCC): ICD-10-CM

## 2022-04-08 DIAGNOSIS — M79.674 PAIN IN TOE OF RIGHT FOOT: ICD-10-CM

## 2022-04-08 PROCEDURE — 99203 OFFICE O/P NEW LOW 30 MIN: CPT | Performed by: PODIATRIST

## 2022-04-08 ASSESSMENT — ENCOUNTER SYMPTOMS
SHORTNESS OF BREATH: 0
NAUSEA: 0
DIARRHEA: 0
COLOR CHANGE: 0
BACK PAIN: 0
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
## 2022-04-13 ENCOUNTER — TELEPHONE (OUTPATIENT)
Dept: VASCULAR SURGERY | Age: 57
End: 2022-04-13

## 2022-04-13 NOTE — TELEPHONE ENCOUNTER
Pt was a N/S for his scheduled testing appointment 4/12/2022 I left a VM for the patient to call back to reschedule appt with Dr Aleja Mccauley and then be transferred to [facility] for testing appt. Patient appoint for 4/15/2022 has been cancelled.

## 2022-06-10 ENCOUNTER — HOSPITAL ENCOUNTER (OUTPATIENT)
Dept: VASCULAR LAB | Age: 57
Discharge: HOME | End: 2022-06-10
Payer: COMMERCIAL

## 2022-06-10 ENCOUNTER — OFFICE VISIT (OUTPATIENT)
Dept: VASCULAR SURGERY | Age: 57
End: 2022-06-10
Payer: COMMERCIAL

## 2022-06-10 VITALS
HEIGHT: 68 IN | HEART RATE: 74 BPM | SYSTOLIC BLOOD PRESSURE: 133 MMHG | BODY MASS INDEX: 25.31 KG/M2 | WEIGHT: 167 LBS | RESPIRATION RATE: 20 BRPM | DIASTOLIC BLOOD PRESSURE: 78 MMHG | TEMPERATURE: 98.2 F | OXYGEN SATURATION: 95 %

## 2022-06-10 DIAGNOSIS — I70.261 ATHEROSCLEROSIS OF NATIVE ARTERIES OF EXTREMITIES WITH GANGRENE, RIGHT LEG (HCC): Primary | ICD-10-CM

## 2022-06-10 DIAGNOSIS — I70.261 ATHEROSCLEROSIS OF NATIVE ARTERY OF RIGHT LOWER EXTREMITY WITH GANGRENE (HCC): Primary | ICD-10-CM

## 2022-06-10 DIAGNOSIS — I73.9 PAD (PERIPHERAL ARTERY DISEASE) (HCC): ICD-10-CM

## 2022-06-10 PROCEDURE — 99204 OFFICE O/P NEW MOD 45 MIN: CPT | Performed by: SURGERY

## 2022-06-10 PROCEDURE — 93923 UPR/LXTR ART STDY 3+ LVLS: CPT

## 2022-06-10 RX ORDER — DOXYCYCLINE HYCLATE 100 MG
100 TABLET ORAL 2 TIMES DAILY
Qty: 28 TABLET | Refills: 2 | Status: ON HOLD | OUTPATIENT
Start: 2022-06-10 | End: 2022-06-27 | Stop reason: HOSPADM

## 2022-06-10 ASSESSMENT — ENCOUNTER SYMPTOMS
COLOR CHANGE: 0
ABDOMINAL DISTENTION: 0
EYE PAIN: 0
VOICE CHANGE: 0
COUGH: 0
ABDOMINAL PAIN: 0
SHORTNESS OF BREATH: 0
TROUBLE SWALLOWING: 0
CHEST TIGHTNESS: 0
VOMITING: 0

## 2022-06-10 NOTE — PROGRESS NOTES
[facility]  [address] [facility]  [address] [place] [address]  Dept: [phone]     Patient: Rekha Narayan  : 1965  MRN: [id number]  DOS: 6/10/2022    Referring provider:  No ref. provider found         HPI:  Rekha Narayan is a 59 y.o. male who comes to the office for the first time regarding bilateral lower extremity atherosclerotic disease with gangrenous changes on the right. He also has some cellulitis on the right. He was sent with a question of whether or not amputation is necessary. He does not complain so much of pain in his foot but he does explain that his swelling has worsened over the last 3 months with redness and gangrenous toe and is with ulceration on the first second and even third toe on the right side. The left side has had toe amputations at the transmetatarsal level of all 5 toes. He walks infrequently and uses a wheelchair for most of the time. He is not currently on antibiotics. He has calf pain when he walks at very short distances of approximately 25 to 50 feet. He denies chest pain and denies shortness of breath. He denies problems with stroke TIA or amaurosis fugax and has never had a coronary event. He does have high blood pressure and high cholesterol for which she is on amlodipine and metoprolol as well as a statin medication. He takes aspirin daily. He does not know of any family history or personal history of aneurysmal disease.   Past Medical History:   Diagnosis Date    Alcohol abuse     CAD (coronary artery disease)     Convulsions ([address]     Frostbite     Hypertension      Family History   Problem Relation Age of Onset    Cancer Mother     Heart Disease Father       Social History     Socioeconomic History    Marital status:      Spouse name: Not on file    Number of children: Not on file    Years of education: Not on file    Highest education level: Not on file   Occupational History    Not on file   Tobacco Use    Smoking status: Current Every Day Smoker     Packs/day: 1.00     Types: Cigarettes    Smokeless tobacco: Never Used   Substance and Sexual Activity    Alcohol use: Yes     Alcohol/week: 6.0 standard drinks     Types: 6 Cans of beer per week     Comment: approx 12 pack daily    Drug use: No    Sexual activity: Not on file   Other Topics Concern    Not on file   Social History Narrative    Not on file     Social Determinants of Health     Financial Resource Strain:     Difficulty of Paying Living Expenses: Not on file   Food Insecurity:     Worried About Running Out of Food in the Last Year: Not on file    Rosy of Food in the Last Year: Not on file   Transportation Needs:     Lack of Transportation (Medical): Not on file    Lack of Transportation (Non-Medical): Not on file   Physical Activity:     Days of Exercise per Week: Not on file    Minutes of Exercise per Session: Not on file   Stress:     Feeling of Stress : Not on file   Social Connections:     Frequency of Communication with Friends and Family: Not on file    Frequency of Social Gatherings with Friends and Family: Not on file    Attends Scientologist Services: Not on file    Active Member of [address] [place] [address] [employer] or Organizations: Not on file    Attends Club or Organization Meetings: Not on file    Marital Status: Not on file   Intimate Partner Violence:     Fear of Current or Ex-Partner: Not on file    Emotionally Abused: Not on file    Physically Abused: Not on file    Sexually Abused: Not on file   Housing Stability:     Unable to Pay for Housing in the Last Year: Not on file    Number of [place] in the Last Year: Not on file    Unstable Housing in the Last Year: Not on file      Past Surgical History:   Procedure Laterality Date    TOE AMPUTATION      left foot      Review of Systems   Constitutional: Negative for activity change, fever and unexpected weight change.    HENT: is mostly in the forefoot. It does not go up into the hindfoot or in the calf. His left lower extremity is without any skin change. He has ulcerative gangrenous changes on the right lower extremity at the first second and third toes. Bone is exposed on the second toe. Pulmonary:      Effort: No respiratory distress. Breath sounds: No rales. Abdominal:      General: There is no distension. Palpations: There is no mass. Tenderness: There is no abdominal tenderness. There is no guarding. Musculoskeletal:      Cervical back: No rigidity or tenderness. Lymphadenopathy:      Cervical: No cervical adenopathy. Skin:     Coloration: Skin is not jaundiced. Findings: No rash. Neurological:      General: No focal deficit present. Mental Status: He is alert and oriented to person, place, and time. Cranial Nerves: No cranial nerve deficit. Psychiatric:         Mood and Affect: Mood normal.         Assessment:  1. 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 I explained that we could go ahead with arteriography and intervention and local wound care with potential toe amputations and be able to get him out of the hospital within a week. He wants to go home and do this as an outpatient. We gave him a prescription for doxycycline and I ordered to go ahead with arteriography of the right lower extremity to evaluate his arterial integrity to the level of the ankle and foot and also have the opportunity to treat during that evaluation. He understands that this procedure involves a groin access on the left with an up and over technique to study the arteries with contrast dye and eventually intervene if possible.   He understands that there are risks to these procedures including but not limited to bleeding, infection, hematoma, nerve damage, limb loss, the need for bypass, stroke, heart attack and death as well as renal insufficiency or failure. He agrees to proceed. We will be seeing him in the near future for arteriography of the right lower extremity. At that time I will again offer him the opportunity of admission if he needs a bypass but more so to remove the toes as he undoubtedly has osteomyelitis of the second toe as that is the toe where bone is exposed.     Electronically signed by:  Leesa Jimenez MD

## 2022-06-15 ENCOUNTER — TELEPHONE (OUTPATIENT)
Dept: VASCULAR SURGERY | Age: 57
End: 2022-06-15

## 2022-06-15 DIAGNOSIS — I70.261 ATHEROSCLEROSIS OF NATIVE ARTERIES OF EXTREMITIES WITH GANGRENE, RIGHT LEG (HCC): Primary | ICD-10-CM

## 2022-06-15 NOTE — TELEPHONE ENCOUNTER
Called to remind the patient of his testing at 1:00 per his request the patient claimed that he was never informed of it. The writer explained that he was called and that he was the one who asked for a call, he does not have transportation and was asking to shaylee the testing, Need to confirm with the provider if he would like to continue with the procedure of if he would like to shaylee the patient.

## 2022-06-16 ENCOUNTER — HOSPITAL ENCOUNTER (INPATIENT)
Dept: CARDIAC CATH/INVASIVE PROCEDURES | Age: 57
LOS: 12 days | Discharge: HOME | DRG: 253 | End: 2022-06-28
Attending: SURGERY | Admitting: SURGERY
Payer: COMMERCIAL

## 2022-06-16 DIAGNOSIS — Z95.828 S/P FEMORAL-FEMORAL BYPASS SURGERY: Primary | ICD-10-CM

## 2022-06-16 DIAGNOSIS — G89.18 POST-OP PAIN: ICD-10-CM

## 2022-06-16 DIAGNOSIS — I73.9 PVD (PERIPHERAL VASCULAR DISEASE) (HCC): ICD-10-CM

## 2022-06-16 DIAGNOSIS — Z98.890: ICD-10-CM

## 2022-06-16 LAB
ANION GAP SERPL CALCULATED.3IONS-SCNC: 15 MMOL/L (ref 9–17)
BUN BLDV-MCNC: 4 MG/DL (ref 6–20)
CALCIUM SERPL-MCNC: 9 MG/DL (ref 8.6–10.4)
CHLORIDE BLD-SCNC: 100 MMOL/L (ref 98–107)
CO2: 20 MMOL/L (ref 20–31)
CREAT SERPL-MCNC: 0.49 MG/DL (ref 0.7–1.2)
GFR AFRICAN AMERICAN: >60 ML/MIN
GFR NON-AFRICAN AMERICAN: >60 ML/MIN
GFR NON-AFRICAN AMERICAN: >60 ML/MIN
GFR SERPL CREATININE-BSD FRML MDRD: >60 ML/MIN
GFR SERPL CREATININE-BSD FRML MDRD: ABNORMAL ML/MIN/{1.73_M2}
GFR SERPL CREATININE-BSD FRML MDRD: NORMAL ML/MIN/{1.73_M2}
GLUCOSE BLD-MCNC: 139 MG/DL (ref 70–99)
GLUCOSE BLD-MCNC: 93 MG/DL (ref 74–100)
HCT VFR BLD CALC: 43.8 % (ref 40.7–50.3)
HEMOGLOBIN: 13.8 G/DL (ref 13–17)
MCH RBC QN AUTO: 30.4 PG (ref 25.2–33.5)
MCHC RBC AUTO-ENTMCNC: 31.5 G/DL (ref 28.4–34.8)
MCV RBC AUTO: 96.5 FL (ref 82.6–102.9)
NRBC AUTOMATED: 0 PER 100 WBC
PDW BLD-RTO: 14.6 % (ref 11.8–14.4)
PLATELET # BLD: 284 K/UL (ref 138–453)
PMV BLD AUTO: 9 FL (ref 8.1–13.5)
POC BUN: 3 MG/DL (ref 8–26)
POC CHLORIDE: 97 MMOL/L (ref 98–107)
POC CREATININE: 0.77 MG/DL (ref 0.51–1.19)
POC HEMATOCRIT: 49 % (ref 41–53)
POC HEMOGLOBIN: 16.5 G/DL (ref 13.5–17.5)
POC INR: 1.1
POC POTASSIUM: 3.6 MMOL/L (ref 3.5–4.5)
POC SODIUM: 133 MMOL/L (ref 138–146)
POTASSIUM SERPL-SCNC: 4 MMOL/L (ref 3.7–5.3)
PROTHROMBIN TIME, POC: 13.4 SEC (ref 10.4–14.2)
RBC # BLD: 4.54 M/UL (ref 4.21–5.77)
SODIUM BLD-SCNC: 135 MMOL/L (ref 135–144)
WBC # BLD: 7.6 K/UL (ref 3.5–11.3)

## 2022-06-16 PROCEDURE — C1894 INTRO/SHEATH, NON-LASER: HCPCS

## 2022-06-16 PROCEDURE — 85014 HEMATOCRIT: CPT

## 2022-06-16 PROCEDURE — 2709999900 HC NON-CHARGEABLE SUPPLY

## 2022-06-16 PROCEDURE — 82947 ASSAY GLUCOSE BLOOD QUANT: CPT

## 2022-06-16 PROCEDURE — 6360000004 HC RX CONTRAST MEDICATION

## 2022-06-16 PROCEDURE — 36245 INS CATH ABD/L-EXT ART 1ST: CPT | Performed by: SURGERY

## 2022-06-16 PROCEDURE — 84520 ASSAY OF UREA NITROGEN: CPT

## 2022-06-16 PROCEDURE — C1892 INTRO/SHEATH,FIXED,PEEL-AWAY: HCPCS

## 2022-06-16 PROCEDURE — 2500000003 HC RX 250 WO HCPCS

## 2022-06-16 PROCEDURE — 75716 ARTERY X-RAYS ARMS/LEGS: CPT | Performed by: SURGERY

## 2022-06-16 PROCEDURE — 82565 ASSAY OF CREATININE: CPT

## 2022-06-16 PROCEDURE — 2060000000 HC ICU INTERMEDIATE R&B

## 2022-06-16 PROCEDURE — B41FYZZ FLUOROSCOPY OF RIGHT LOWER EXTREMITY ARTERIES USING OTHER CONTRAST: ICD-10-PCS | Performed by: SURGERY

## 2022-06-16 PROCEDURE — 36140 INTRO NDL ICATH UPR/LXTR ART: CPT

## 2022-06-16 PROCEDURE — 85610 PROTHROMBIN TIME: CPT

## 2022-06-16 PROCEDURE — 85027 COMPLETE CBC AUTOMATED: CPT

## 2022-06-16 PROCEDURE — 2580000003 HC RX 258: Performed by: SURGERY

## 2022-06-16 PROCEDURE — 6370000000 HC RX 637 (ALT 250 FOR IP): Performed by: STUDENT IN AN ORGANIZED HEALTH CARE EDUCATION/TRAINING PROGRAM

## 2022-06-16 PROCEDURE — 6360000002 HC RX W HCPCS

## 2022-06-16 PROCEDURE — 36415 COLL VENOUS BLD VENIPUNCTURE: CPT

## 2022-06-16 PROCEDURE — 80048 BASIC METABOLIC PNL TOTAL CA: CPT

## 2022-06-16 PROCEDURE — 75716 ARTERY X-RAYS ARMS/LEGS: CPT

## 2022-06-16 PROCEDURE — C1769 GUIDE WIRE: HCPCS

## 2022-06-16 PROCEDURE — 82435 ASSAY OF BLOOD CHLORIDE: CPT

## 2022-06-16 PROCEDURE — 84295 ASSAY OF SERUM SODIUM: CPT

## 2022-06-16 PROCEDURE — 84132 ASSAY OF SERUM POTASSIUM: CPT

## 2022-06-16 RX ORDER — ATORVASTATIN CALCIUM 10 MG/1
10 TABLET, FILM COATED ORAL NIGHTLY
Status: DISCONTINUED | OUTPATIENT
Start: 2022-06-16 | End: 2022-06-16

## 2022-06-16 RX ORDER — SODIUM CHLORIDE 0.9 % (FLUSH) 0.9 %
5-40 SYRINGE (ML) INJECTION EVERY 12 HOURS SCHEDULED
Status: DISCONTINUED | OUTPATIENT
Start: 2022-06-16 | End: 2022-06-28 | Stop reason: HOSPADM

## 2022-06-16 RX ORDER — METHYLPREDNISOLONE SODIUM SUCCINATE 125 MG/2ML
125 INJECTION, POWDER, LYOPHILIZED, FOR SOLUTION INTRAMUSCULAR; INTRAVENOUS ONCE
Status: COMPLETED | OUTPATIENT
Start: 2022-06-16 | End: 2022-06-16

## 2022-06-16 RX ORDER — SODIUM CHLORIDE 9 MG/ML
INJECTION, SOLUTION INTRAVENOUS PRN
Status: DISCONTINUED | OUTPATIENT
Start: 2022-06-16 | End: 2022-06-28 | Stop reason: HOSPADM

## 2022-06-16 RX ORDER — DIPHENHYDRAMINE HYDROCHLORIDE 50 MG/ML
50 INJECTION INTRAMUSCULAR; INTRAVENOUS ONCE
Status: COMPLETED | OUTPATIENT
Start: 2022-06-16 | End: 2022-06-16

## 2022-06-16 RX ORDER — ONDANSETRON 2 MG/ML
4 INJECTION INTRAMUSCULAR; INTRAVENOUS EVERY 6 HOURS PRN
Status: DISCONTINUED | OUTPATIENT
Start: 2022-06-16 | End: 2022-06-28 | Stop reason: HOSPADM

## 2022-06-16 RX ORDER — ASPIRIN 81 MG/1
81 TABLET ORAL DAILY
Status: DISCONTINUED | OUTPATIENT
Start: 2022-06-17 | End: 2022-06-28 | Stop reason: HOSPADM

## 2022-06-16 RX ORDER — CLOPIDOGREL BISULFATE 75 MG/1
75 TABLET ORAL DAILY
Status: DISCONTINUED | OUTPATIENT
Start: 2022-06-17 | End: 2022-06-23

## 2022-06-16 RX ORDER — SODIUM CHLORIDE 0.9 % (FLUSH) 0.9 %
5-40 SYRINGE (ML) INJECTION PRN
Status: DISCONTINUED | OUTPATIENT
Start: 2022-06-16 | End: 2022-06-23

## 2022-06-16 RX ORDER — ONDANSETRON 4 MG/1
4 TABLET, ORALLY DISINTEGRATING ORAL EVERY 8 HOURS PRN
Status: DISCONTINUED | OUTPATIENT
Start: 2022-06-16 | End: 2022-06-28 | Stop reason: HOSPADM

## 2022-06-16 RX ORDER — ENOXAPARIN SODIUM 100 MG/ML
40 INJECTION SUBCUTANEOUS DAILY
Status: DISCONTINUED | OUTPATIENT
Start: 2022-06-17 | End: 2022-06-25

## 2022-06-16 RX ORDER — OXYCODONE HYDROCHLORIDE AND ACETAMINOPHEN 5; 325 MG/1; MG/1
1 TABLET ORAL EVERY 6 HOURS PRN
Status: COMPLETED | OUTPATIENT
Start: 2022-06-16 | End: 2022-06-16

## 2022-06-16 RX ORDER — ACETAMINOPHEN 325 MG/1
650 TABLET ORAL EVERY 4 HOURS PRN
Status: DISCONTINUED | OUTPATIENT
Start: 2022-06-16 | End: 2022-06-17

## 2022-06-16 RX ORDER — PRAVASTATIN SODIUM 20 MG
40 TABLET ORAL DAILY
Status: DISCONTINUED | OUTPATIENT
Start: 2022-06-17 | End: 2022-06-28 | Stop reason: HOSPADM

## 2022-06-16 RX ADMIN — OXYCODONE HYDROCHLORIDE AND ACETAMINOPHEN 1 TABLET: 5; 325 TABLET ORAL at 23:51

## 2022-06-16 RX ADMIN — DIPHENHYDRAMINE HYDROCHLORIDE 50 MG: 50 INJECTION INTRAMUSCULAR; INTRAVENOUS at 11:57

## 2022-06-16 RX ADMIN — SODIUM CHLORIDE, PRESERVATIVE FREE 10 ML: 5 INJECTION INTRAVENOUS at 21:19

## 2022-06-16 RX ADMIN — METHYLPREDNISOLONE SODIUM SUCCINATE 125 MG: 125 INJECTION, POWDER, LYOPHILIZED, FOR SOLUTION INTRAMUSCULAR; INTRAVENOUS at 11:56

## 2022-06-16 RX ADMIN — OXYCODONE HYDROCHLORIDE AND ACETAMINOPHEN 1 TABLET: 5; 325 TABLET ORAL at 17:51

## 2022-06-16 ASSESSMENT — PAIN DESCRIPTION - PAIN TYPE: TYPE: CHRONIC PAIN

## 2022-06-16 ASSESSMENT — PAIN SCALES - GENERAL
PAINLEVEL_OUTOF10: 8
PAINLEVEL_OUTOF10: 8

## 2022-06-16 ASSESSMENT — PAIN DESCRIPTION - DESCRIPTORS
DESCRIPTORS: SHARP
DESCRIPTORS: ACHING

## 2022-06-16 ASSESSMENT — PAIN - FUNCTIONAL ASSESSMENT
PAIN_FUNCTIONAL_ASSESSMENT: PREVENTS OR INTERFERES SOME ACTIVE ACTIVITIES AND ADLS
PAIN_FUNCTIONAL_ASSESSMENT: ACTIVITIES ARE NOT PREVENTED

## 2022-06-16 ASSESSMENT — PAIN DESCRIPTION - ORIENTATION
ORIENTATION: RIGHT
ORIENTATION: RIGHT

## 2022-06-16 ASSESSMENT — PAIN DESCRIPTION - FREQUENCY: FREQUENCY: CONTINUOUS

## 2022-06-16 ASSESSMENT — PAIN DESCRIPTION - LOCATION
LOCATION: FOOT
LOCATION: FOOT

## 2022-06-16 NOTE — PLAN OF CARE
Problem: Discharge Planning  Goal: Discharge to home or other facility with appropriate resources  6/16/2022 1946 by Gabby Gomez RN  Outcome: Progressing  6/16/2022 1946 by Gabby Gomez RN  Outcome: Progressing     Problem: Safety - Adult  Goal: Free from fall injury  6/16/2022 1946 by Gabby Gomez RN  Outcome: Progressing  6/16/2022 1946 by Gabby Gomez RN  Outcome: Progressing     Problem: ABCDS Injury Assessment  Goal: Absence of physical injury  6/16/2022 1946 by Gabby Gomez RN  Outcome: Progressing  6/16/2022 1946 by Gabby Gomez RN  Outcome: Progressing     Problem: Pain  Goal: Verbalizes/displays adequate comfort level or baseline comfort level  6/16/2022 1946 by Gabby Gomez RN  Outcome: Progressing  6/16/2022 1946 by Gabby Gomez RN  Outcome: Progressing     Problem: Skin/Tissue Integrity  Goal: Absence of new skin breakdown  Description: 1. Monitor for areas of redness and/or skin breakdown  2. Assess vascular access sites hourly  3. Every 4-6 hours minimum:  Change oxygen saturation probe site  4. Every 4-6 hours:  If on nasal continuous positive airway pressure, respiratory therapy assess nares and determine need for appliance change or resting period.   Outcome: Progressing

## 2022-06-17 ENCOUNTER — APPOINTMENT (OUTPATIENT)
Dept: CT IMAGING | Age: 57
DRG: 253 | End: 2022-06-17
Attending: SURGERY
Payer: COMMERCIAL

## 2022-06-17 LAB
ABSOLUTE EOS #: 0.09 K/UL (ref 0–0.44)
ABSOLUTE IMMATURE GRANULOCYTE: 0.06 K/UL (ref 0–0.3)
ABSOLUTE LYMPH #: 1.85 K/UL (ref 1.1–3.7)
ABSOLUTE MONO #: 1.02 K/UL (ref 0.1–1.2)
ANION GAP SERPL CALCULATED.3IONS-SCNC: 15 MMOL/L (ref 9–17)
BASOPHILS # BLD: 0 % (ref 0–2)
BASOPHILS ABSOLUTE: 0.03 K/UL (ref 0–0.2)
BUN BLDV-MCNC: 7 MG/DL (ref 6–20)
CALCIUM SERPL-MCNC: 8.7 MG/DL (ref 8.6–10.4)
CHLORIDE BLD-SCNC: 97 MMOL/L (ref 98–107)
CO2: 21 MMOL/L (ref 20–31)
CREAT SERPL-MCNC: 0.56 MG/DL (ref 0.7–1.2)
EOSINOPHILS RELATIVE PERCENT: 1 % (ref 1–4)
GFR AFRICAN AMERICAN: >60 ML/MIN
GFR NON-AFRICAN AMERICAN: >60 ML/MIN
GFR SERPL CREATININE-BSD FRML MDRD: ABNORMAL ML/MIN/{1.73_M2}
GLUCOSE BLD-MCNC: 104 MG/DL (ref 70–99)
HCT VFR BLD CALC: 38.5 % (ref 40.7–50.3)
HEMOGLOBIN: 12.3 G/DL (ref 13–17)
IMMATURE GRANULOCYTES: 1 %
LYMPHOCYTES # BLD: 16 % (ref 24–43)
MCH RBC QN AUTO: 30.1 PG (ref 25.2–33.5)
MCHC RBC AUTO-ENTMCNC: 31.9 G/DL (ref 28.4–34.8)
MCV RBC AUTO: 94.1 FL (ref 82.6–102.9)
MONOCYTES # BLD: 9 % (ref 3–12)
NRBC AUTOMATED: 0 PER 100 WBC
PDW BLD-RTO: 14.6 % (ref 11.8–14.4)
PLATELET # BLD: 317 K/UL (ref 138–453)
PMV BLD AUTO: 9.5 FL (ref 8.1–13.5)
POTASSIUM SERPL-SCNC: 3.5 MMOL/L (ref 3.7–5.3)
RBC # BLD: 4.09 M/UL (ref 4.21–5.77)
RBC # BLD: ABNORMAL 10*6/UL
SEG NEUTROPHILS: 74 % (ref 36–65)
SEGMENTED NEUTROPHILS ABSOLUTE COUNT: 8.56 K/UL (ref 1.5–8.1)
SODIUM BLD-SCNC: 133 MMOL/L (ref 135–144)
WBC # BLD: 11.6 K/UL (ref 3.5–11.3)

## 2022-06-17 PROCEDURE — 74174 CTA ABD&PLVS W/CONTRAST: CPT

## 2022-06-17 PROCEDURE — 6360000002 HC RX W HCPCS: Performed by: STUDENT IN AN ORGANIZED HEALTH CARE EDUCATION/TRAINING PROGRAM

## 2022-06-17 PROCEDURE — 2580000003 HC RX 258: Performed by: SURGERY

## 2022-06-17 PROCEDURE — 80048 BASIC METABOLIC PNL TOTAL CA: CPT

## 2022-06-17 PROCEDURE — 36415 COLL VENOUS BLD VENIPUNCTURE: CPT

## 2022-06-17 PROCEDURE — 6360000004 HC RX CONTRAST MEDICATION: Performed by: STUDENT IN AN ORGANIZED HEALTH CARE EDUCATION/TRAINING PROGRAM

## 2022-06-17 PROCEDURE — 85025 COMPLETE CBC W/AUTO DIFF WBC: CPT

## 2022-06-17 PROCEDURE — 6370000000 HC RX 637 (ALT 250 FOR IP): Performed by: STUDENT IN AN ORGANIZED HEALTH CARE EDUCATION/TRAINING PROGRAM

## 2022-06-17 PROCEDURE — 2060000000 HC ICU INTERMEDIATE R&B

## 2022-06-17 PROCEDURE — 2580000003 HC RX 258: Performed by: STUDENT IN AN ORGANIZED HEALTH CARE EDUCATION/TRAINING PROGRAM

## 2022-06-17 RX ORDER — IBUPROFEN 800 MG/1
400 TABLET ORAL EVERY 6 HOURS PRN
Status: DISCONTINUED | OUTPATIENT
Start: 2022-06-17 | End: 2022-06-23

## 2022-06-17 RX ORDER — OXYCODONE HYDROCHLORIDE AND ACETAMINOPHEN 5; 325 MG/1; MG/1
1 TABLET ORAL ONCE
Status: COMPLETED | OUTPATIENT
Start: 2022-06-17 | End: 2022-06-17

## 2022-06-17 RX ORDER — ACETAMINOPHEN 500 MG
1000 TABLET ORAL EVERY 8 HOURS
Status: DISCONTINUED | OUTPATIENT
Start: 2022-06-17 | End: 2022-06-23

## 2022-06-17 RX ORDER — POTASSIUM CHLORIDE 20 MEQ/1
40 TABLET, EXTENDED RELEASE ORAL ONCE
Status: COMPLETED | OUTPATIENT
Start: 2022-06-17 | End: 2022-06-17

## 2022-06-17 RX ADMIN — PRAVASTATIN SODIUM 40 MG: 20 TABLET ORAL at 20:43

## 2022-06-17 RX ADMIN — Medication 81 MG: at 07:58

## 2022-06-17 RX ADMIN — SODIUM CHLORIDE, PRESERVATIVE FREE 10 ML: 5 INJECTION INTRAVENOUS at 08:18

## 2022-06-17 RX ADMIN — SODIUM CHLORIDE, PRESERVATIVE FREE 10 ML: 5 INJECTION INTRAVENOUS at 20:38

## 2022-06-17 RX ADMIN — POTASSIUM CHLORIDE 40 MEQ: 1500 TABLET, EXTENDED RELEASE ORAL at 18:18

## 2022-06-17 RX ADMIN — ACETAMINOPHEN 650 MG: 325 TABLET ORAL at 07:59

## 2022-06-17 RX ADMIN — OXYCODONE HYDROCHLORIDE AND ACETAMINOPHEN 1 TABLET: 5; 325 TABLET ORAL at 12:24

## 2022-06-17 RX ADMIN — ENOXAPARIN SODIUM 40 MG: 100 INJECTION SUBCUTANEOUS at 07:58

## 2022-06-17 RX ADMIN — SODIUM CHLORIDE, PRESERVATIVE FREE 10 ML: 5 INJECTION INTRAVENOUS at 07:59

## 2022-06-17 RX ADMIN — ACETAMINOPHEN 1000 MG: 500 TABLET ORAL at 15:58

## 2022-06-17 RX ADMIN — ACETAMINOPHEN 1000 MG: 500 TABLET ORAL at 23:37

## 2022-06-17 RX ADMIN — CLOPIDOGREL 75 MG: 75 TABLET, FILM COATED ORAL at 07:58

## 2022-06-17 RX ADMIN — IOPAMIDOL 100 ML: 755 INJECTION, SOLUTION INTRAVENOUS at 09:36

## 2022-06-17 ASSESSMENT — PAIN DESCRIPTION - LOCATION
LOCATION: FOOT

## 2022-06-17 ASSESSMENT — PAIN DESCRIPTION - ORIENTATION
ORIENTATION: RIGHT;LEFT
ORIENTATION: LEFT;RIGHT
ORIENTATION: RIGHT;LEFT
ORIENTATION: RIGHT;LEFT

## 2022-06-17 ASSESSMENT — PAIN DESCRIPTION - DESCRIPTORS
DESCRIPTORS: ACHING;BURNING
DESCRIPTORS: ACHING;BURNING
DESCRIPTORS: BURNING

## 2022-06-17 ASSESSMENT — PAIN SCALES - GENERAL
PAINLEVEL_OUTOF10: 8
PAINLEVEL_OUTOF10: 5
PAINLEVEL_OUTOF10: 6
PAINLEVEL_OUTOF10: 8
PAINLEVEL_OUTOF10: 7
PAINLEVEL_OUTOF10: 7

## 2022-06-17 ASSESSMENT — PAIN - FUNCTIONAL ASSESSMENT: PAIN_FUNCTIONAL_ASSESSMENT: PREVENTS OR INTERFERES SOME ACTIVE ACTIVITIES AND ADLS

## 2022-06-17 ASSESSMENT — PAIN DESCRIPTION - PAIN TYPE: TYPE: CHRONIC PAIN

## 2022-06-17 ASSESSMENT — PAIN DESCRIPTION - FREQUENCY: FREQUENCY: CONTINUOUS

## 2022-06-17 NOTE — PROGRESS NOTES
Vascular Surgery   Progress Note    PATIENT NAME: Dinora Pozo     TODAY'S DATE: 6/17/2022, 7:30 AM    SUBJECTIVE:     Pt seen and examined at bedside. No acute overnight events. Overall doing okay. Denies much pain. Groin sites without hematoma. OBJECTIVE:   VITALS:  /83   Pulse 69   Temp 98 °F (36.7 °C) (Oral)   Resp 15   Ht 5' 8\" (1.727 m)   Wt 163 lb (73.9 kg)   SpO2 94%   BMI 24.78 kg/m²      INTAKE/OUTPUT:    No intake or output data in the 24 hours ending 06/17/22 0730    PHYSICAL EXAM:  General Appearance: awake, alert, oriented, in no acute distress  HEENT:  Normocephalic, atraumatic, mucus membranes moist   Heart: Regular rate and rhythm  Lungs: normal effort with symmetric rise and fall of chest wall  Abdomen: soft, nondistended, nontender to palpation  Extremities: RLE - toe amputations, delayed capillary refill, no dopplerable signals, weakly palpable femoral pulse; LLE - toe amputations, delayed capillary refill, no dopplerable signals, weakly palpable femoral pulse  Skin: Skin color, texture, turgor normal. No rashes or lesions. Data:  CBC: Recent Labs     06/16/22  1647   WBC 7.6   HGB 13.8        Chemistry:   Recent Labs     06/16/22  1138 06/16/22  1647   NA  --  135   K  --  4.0   CL  --  100   CO2  --  20   GLUCOSE  --  139*   BUN  --  4*   CREATININE 0.77 0.49*   ANIONGAP  --  15   LABGLOM >60 >60   GFRAA  --  >60   CALCIUM  --  9.0     Hepatic: No results for input(s): AST, ALT, ALB, ALKPHOS, BILITOT, BILIDIR in the last 72 hours. Coagulation:   Recent Labs     06/16/22  1140   INR 1.1         Radiology Review:    No results found. ASSESSMENT:  Active Hospital Problems    Diagnosis Date Noted    PVD (peripheral vascular disease) ([facility] [address] [I73.9] 06/16/2022     Priority: Medium    S/P arteriogram of extremity [Z98.890] 06/16/2022     Priority: Medium       1. 59 y.o. male with severe PAD s/p b/l LE angiogram 6/16      Plan:  1. General diet  2.  CTA aorta with b/l run-off today for surgical planning  3. OK to ambulate as tolerated  4.  Possible d/cfor elective revascularization vs operative planning inpatient    Electronically signed by Malgorzata Sanchez DO  on 6/17/2022 at 7:30 AM

## 2022-06-17 NOTE — FLOWSHEET NOTE
[facility] CARE DEPARTMENT - [address]  PROGRESS NOTE    Shift date: 6.16.2022  Shift day: Thursday   Shift # 2    Room # 2024/2024-01   Name: Zechariah Arnold                Anabaptist: unknown   Place of [place]: unknown    Referral: Routine Visit    Admit Date & Time: 6/16/2022  4:16 PM    Assessment:  Zechariah Arnold is a 59 y.o. male in the hospital. Upon entering the room writer observes the patient with the significant other bedside. Both appear to be calm and coping. Intervention:  Writer introduced self and title as  Writer offered space for patinet and significant other  to express feelings, needs, and concerns and provided a ministry presence. Determined support to be available. Outcome:  No immediate spiritual or emotional needs at this time. Plan:  Chaplains will remain available to offer spiritual and emotional support as needed. Electronically signed by Joce Curran on 6/16/2022 at 8:00 PM.  [address] [facility]  [phone]         06/16/22 1750   Encounter Summary   Service Provided For: Patient;Significant other   Referral/Consult From: [facility] [facility] Significant other   Last Encounter  06/16/22   Complexity of Encounter Moderate   Begin Time 1750   End Time  1800   Total Time Calculated 10 min   Encounter    Type Initial Screen/Assessment   Assessment/Intervention/Outcome   Assessment Calm;Coping   Intervention Active listening;Explored/Affirmed feelings, thoughts, concerns;Explored Coping Skills/Resources; Discussed illness injury and its impact   Outcome Coping     Electronically signed by David Damon on 6/16/2022 at 8:00 PM

## 2022-06-17 NOTE — PLAN OF CARE
Problem: Discharge Planning  Goal: Discharge to home or other facility with appropriate resources  6/17/2022 1608 by Zenon Call, RN  Outcome: Progressing  Flowsheets (Taken 6/17/2022 0800)  Discharge to home or other facility with appropriate resources: Identify barriers to discharge with patient and caregiver  6/17/2022 0411 by Corinne Poot, RN  Outcome: Progressing  Flowsheets (Taken 6/16/2022 2000)  Discharge to home or other facility with appropriate resources: Identify barriers to discharge with patient and caregiver     Problem: Safety - Adult  Goal: Free from fall injury  6/17/2022 1608 by Zenon Call, RN  Outcome: Progressing  Flowsheets (Taken 6/17/2022 0935)  Free From Fall Injury: Instruct family/caregiver on patient safety  6/17/2022 0411 by Corinne Poot, RN  Outcome: Progressing     Problem: ABCDS Injury Assessment  Goal: Absence of physical injury  6/17/2022 1608 by Zenon Call, RN  Outcome: Progressing  Flowsheets (Taken 6/17/2022 0935)  Absence of Physical Injury: Implement safety measures based on patient assessment  6/17/2022 0411 by Corinne Poot, RN  Outcome: Progressing  Flowsheets (Taken 6/16/2022 2134)  Absence of Physical Injury: Implement safety measures based on patient assessment     Problem: Pain  Goal: Verbalizes/displays adequate comfort level or baseline comfort level  6/17/2022 1608 by Zenon Call, RN  Outcome: Progressing  6/17/2022 0411 by Corinne Poot, RN  Outcome: Progressing     Problem: Skin/Tissue Integrity  Goal: Absence of new skin breakdown  Description: 1. Monitor for areas of redness and/or skin breakdown  2. Assess vascular access sites hourly  3. Every 4-6 hours minimum:  Change oxygen saturation probe site  4. Every 4-6 hours:  If on nasal continuous positive airway pressure, respiratory therapy assess nares and determine need for appliance change or resting period.   6/17/2022 1608 by Zenon Call, RN  Outcome: Progressing  6/17/2022 0411 by Reyes Schultz RN  Outcome: Progressing

## 2022-06-17 NOTE — CARE COORDINATION
Case Management Initial Discharge Plan  Don Heller,             Met with:patient to discuss discharge plans. Information verified: address, contacts, phone number, , insurance Yes  Insurance Provider: [place] and Medicaid [place]  : No    Emergency Contact/Next of Kin name & number: Jesse Olea (sister) [phone]  Who are involved in patient's support system? family    PCP: No primary care provider on file. Date of last visit:       Discharge Planning    Living Arrangements:  Alone     Home has 1 stories  4 stairs to climb to get into front door    Patient able to perform ADL's:Independent    Current Services (outpatient & in home) dressing supplies delivered from dr office on [facility] through [facility]  DME equipment: cane  DME provider:     Is patient receiving oral anticoagulation therapy? No    Does patient have any issues/concerns obtaining medications? No  If yes, what are patient's concerns? Is there a preferred Pharmacy after hours or on weekends? Yes    If yes, which pharmacy? [facility]    Potential Assistance Needed:  N/A    Patient agreeable to home care: No  [place] of choice provided:  n/a    Prior SNF/Rehab Placement and Facility: [facility]  Agreeable to SNF/Rehab: No  [place] of choice provided: n/a     Evaluation: no    Expected Discharge date:       Patient expects to be discharged to: If home: is the family and/or caregiver wiling & able to provide support at home? independent  Who will be providing this support? Follow Up Appointment: Best Day/ Time:      Transportation provider: bus or sister  Transportation arrangements needed for discharge: No    Readmission Risk              Risk of Unplanned Readmission:  6             Does patient have a readmission risk score greater than 14?: No  If yes, follow-up appointment must be made within 7 days of discharge.      Goals of Care: comfort      Educated patient on transitional options, provided freedom of choice and are agreeable with plan      Discharge Plan: home independently          Electronically signed by Onita Meckel, RN on 6/17/22 at 9:13 AM EDT

## 2022-06-17 NOTE — PLAN OF CARE
Problem: Discharge Planning  Goal: Discharge to home or other facility with appropriate resources  6/17/2022 0411 by Reyes Schultz RN  Outcome: Progressing  Flowsheets (Taken 6/16/2022 2000)  Discharge to home or other facility with appropriate resources: Identify barriers to discharge with patient and caregiver  6/16/2022 1946 by Gt Burroughs RN  Outcome: Progressing  6/16/2022 1946 by Gt Burroughs RN  Outcome: Progressing     Problem: Safety - Adult  Goal: Free from fall injury  6/17/2022 0411 by Reyes Schultz RN  Outcome: Progressing  6/16/2022 1946 by Gt Burroughs RN  Outcome: Progressing  6/16/2022 1946 by Gt Burroughs RN  Outcome: Progressing     Problem: ABCDS Injury Assessment  Goal: Absence of physical injury  6/17/2022 0411 by Reyes Schultz RN  Outcome: Progressing  Flowsheets (Taken 6/16/2022 2134)  Absence of Physical Injury: Implement safety measures based on patient assessment  6/16/2022 1946 by Gt Burroughs RN  Outcome: Progressing  6/16/2022 1946 by Gt Burroughs RN  Outcome: Progressing     Problem: Pain  Goal: Verbalizes/displays adequate comfort level or baseline comfort level  6/17/2022 0411 by Reyes Schultz RN  Outcome: Progressing  6/16/2022 1946 by Gt Burroughs RN  Outcome: Progressing  6/16/2022 1946 by Gt Burroguhs RN  Outcome: Progressing     Problem: Skin/Tissue Integrity  Goal: Absence of new skin breakdown  Description: 1. Monitor for areas of redness and/or skin breakdown  2. Assess vascular access sites hourly  3. Every 4-6 hours minimum:  Change oxygen saturation probe site  4. Every 4-6 hours:  If on nasal continuous positive airway pressure, respiratory therapy assess nares and determine need for appliance change or resting period.   6/17/2022 0411 by Reyes Schultz RN  Outcome: Progressing  6/16/2022 1946 by Gt Burroughs RN  Outcome: Progressing

## 2022-06-18 PROCEDURE — 2060000000 HC ICU INTERMEDIATE R&B

## 2022-06-18 PROCEDURE — 2580000003 HC RX 258: Performed by: STUDENT IN AN ORGANIZED HEALTH CARE EDUCATION/TRAINING PROGRAM

## 2022-06-18 PROCEDURE — 6370000000 HC RX 637 (ALT 250 FOR IP): Performed by: STUDENT IN AN ORGANIZED HEALTH CARE EDUCATION/TRAINING PROGRAM

## 2022-06-18 PROCEDURE — 6370000000 HC RX 637 (ALT 250 FOR IP): Performed by: SURGERY

## 2022-06-18 PROCEDURE — 6360000002 HC RX W HCPCS: Performed by: STUDENT IN AN ORGANIZED HEALTH CARE EDUCATION/TRAINING PROGRAM

## 2022-06-18 PROCEDURE — 6370000000 HC RX 637 (ALT 250 FOR IP): Performed by: EMERGENCY MEDICINE

## 2022-06-18 PROCEDURE — 2580000003 HC RX 258: Performed by: SURGERY

## 2022-06-18 RX ORDER — HYDRALAZINE HYDROCHLORIDE 20 MG/ML
10 INJECTION INTRAMUSCULAR; INTRAVENOUS EVERY 4 HOURS PRN
Status: DISCONTINUED | OUTPATIENT
Start: 2022-06-18 | End: 2022-06-27

## 2022-06-18 RX ORDER — OXYCODONE HYDROCHLORIDE 5 MG/1
5 TABLET ORAL ONCE
Status: COMPLETED | OUTPATIENT
Start: 2022-06-18 | End: 2022-06-18

## 2022-06-18 RX ORDER — OXYCODONE HYDROCHLORIDE 5 MG/1
10 TABLET ORAL EVERY 4 HOURS PRN
Status: DISCONTINUED | OUTPATIENT
Start: 2022-06-18 | End: 2022-06-19

## 2022-06-18 RX ADMIN — ACETAMINOPHEN 1000 MG: 500 TABLET ORAL at 09:04

## 2022-06-18 RX ADMIN — PRAVASTATIN SODIUM 40 MG: 20 TABLET ORAL at 21:24

## 2022-06-18 RX ADMIN — OXYCODONE 5 MG: 5 TABLET ORAL at 02:43

## 2022-06-18 RX ADMIN — SODIUM CHLORIDE, PRESERVATIVE FREE 10 ML: 5 INJECTION INTRAVENOUS at 21:24

## 2022-06-18 RX ADMIN — ACETAMINOPHEN 1000 MG: 500 TABLET ORAL at 16:17

## 2022-06-18 RX ADMIN — SODIUM CHLORIDE, PRESERVATIVE FREE 5 ML: 5 INJECTION INTRAVENOUS at 09:05

## 2022-06-18 RX ADMIN — Medication 81 MG: at 09:04

## 2022-06-18 RX ADMIN — ENOXAPARIN SODIUM 40 MG: 100 INJECTION SUBCUTANEOUS at 09:04

## 2022-06-18 RX ADMIN — OXYCODONE 10 MG: 5 TABLET ORAL at 16:54

## 2022-06-18 RX ADMIN — OXYCODONE 10 MG: 5 TABLET ORAL at 21:24

## 2022-06-18 RX ADMIN — SODIUM CHLORIDE, PRESERVATIVE FREE 10 ML: 5 INJECTION INTRAVENOUS at 21:25

## 2022-06-18 ASSESSMENT — PAIN DESCRIPTION - DESCRIPTORS
DESCRIPTORS: ACHING;BURNING
DESCRIPTORS: ACHING;DISCOMFORT

## 2022-06-18 ASSESSMENT — PAIN SCALES - GENERAL
PAINLEVEL_OUTOF10: 8
PAINLEVEL_OUTOF10: 7
PAINLEVEL_OUTOF10: 9

## 2022-06-18 ASSESSMENT — PAIN - FUNCTIONAL ASSESSMENT
PAIN_FUNCTIONAL_ASSESSMENT: ACTIVITIES ARE NOT PREVENTED
PAIN_FUNCTIONAL_ASSESSMENT: PREVENTS OR INTERFERES SOME ACTIVE ACTIVITIES AND ADLS

## 2022-06-18 ASSESSMENT — PAIN DESCRIPTION - LOCATION
LOCATION: FOOT
LOCATION: FOOT

## 2022-06-18 ASSESSMENT — PAIN DESCRIPTION - FREQUENCY: FREQUENCY: CONTINUOUS

## 2022-06-18 ASSESSMENT — PAIN DESCRIPTION - ONSET: ONSET: ON-GOING

## 2022-06-18 ASSESSMENT — PAIN DESCRIPTION - ORIENTATION
ORIENTATION: RIGHT
ORIENTATION: RIGHT

## 2022-06-18 ASSESSMENT — PAIN DESCRIPTION - PAIN TYPE: TYPE: ACUTE PAIN;CHRONIC PAIN

## 2022-06-18 NOTE — PROGRESS NOTES
Vascular Surgery   Progress Note    PATIENT NAME: Zechariah Arnold     TODAY'S DATE: 6/18/2022     SUBJECTIVE:     Pt seen and examined at bedside. Afebrile, VSS. No acute overnight events. Endorses his chronic LE pain (R>L). OBJECTIVE:   VITALS:  /73   Pulse 67   Temp 98.2 °F (36.8 °C) (Oral)   Resp 19   Ht 5' 8\" (1.727 m)   Wt 163 lb (73.9 kg)   SpO2 98%   BMI 24.78 kg/m²      INTAKE/OUTPUT:      Intake/Output Summary (Last 24 hours) at 6/18/2022 0851  Last data filed at 6/18/2022 0644  Gross per 24 hour   Intake --   Output 2000 ml   Net -2000 ml       PHYSICAL EXAM:  General Appearance: awake, alert, oriented, in no acute distress  HEENT:  Normocephalic, atraumatic, mucus membranes moist   Heart: Regular rate and rhythm  Lungs: normal effort with symmetric rise and fall of chest wall  Abdomen: soft, nondistended, nontender to palpation  Extremities: RLE - toe amputations, delayed capillary refill, no dopplerable signals    LLE - toe amputations, delayed capillary refill, no dopplerable signals  Skin: Skin color, texture, turgor normal. No rashes or lesions. Data:  CBC:   Recent Labs     06/16/22  1647 06/17/22  1017   WBC 7.6 11.6*   HGB 13.8 12.3*    317     Chemistry:   Recent Labs     06/16/22  1138 06/16/22  1647 06/17/22  1017   NA  --  135 133*   K  --  4.0 3.5*   CL  --  100 97*   CO2  --  20 21   GLUCOSE  --  139* 104*   BUN  --  4* 7   CREATININE 0.77 0.49* 0.56*   ANIONGAP  --  15 15   LABGLOM >60 >60 >60   GFRAA  --  >60 >60   CALCIUM  --  9.0 8.7     Hepatic: No results for input(s): AST, ALT, ALB, ALKPHOS, BILITOT, BILIDIR in the last 72 hours. Coagulation:   Recent Labs     06/16/22  1140   INR 1.1       Radiology Review:    No results found.     ASSESSMENT:  Active Hospital Problems    Diagnosis Date Noted    PVD (peripheral vascular disease) ([facility] [address] [I73.9] 06/16/2022     Priority: Medium    S/P arteriogram of extremity [Z98.890] 06/16/2022     Priority: Medium     1. 57 y.o. male with severe PAD s/p b/l LE angiogram 6/16    Plan:  1. General diet  2. OK to ambulate as tolerated  3.  Planning for aortobifemoral bypass 6/20    Keith Huerta DO  General Surgery PGY-3

## 2022-06-18 NOTE — PLAN OF CARE
Problem: Discharge Planning  Goal: Discharge to home or other facility with appropriate resources  6/17/2022 2215 by Leeann Rashid RN  Outcome: Progressing  Flowsheets (Taken 6/17/2022 2000)  Discharge to home or other facility with appropriate resources: Identify barriers to discharge with patient and caregiver  6/17/2022 1608 by Matthew Cain RN  Outcome: Progressing  Flowsheets (Taken 6/17/2022 0800)  Discharge to home or other facility with appropriate resources: Identify barriers to discharge with patient and caregiver     Problem: Safety - Adult  Goal: Free from fall injury  6/17/2022 2215 by Leeann Rashid RN  Outcome: Progressing  Flowsheets (Taken 6/17/2022 2200)  Free From Fall Injury: Instruct family/caregiver on patient safety  6/17/2022 1608 by Matthew Cain RN  Outcome: Progressing  Flowsheets (Taken 6/17/2022 0935)  Free From Fall Injury: Instruct family/caregiver on patient safety     Problem: ABCDS Injury Assessment  Goal: Absence of physical injury  6/17/2022 2215 by Leeann Rashid RN  Outcome: Progressing  Flowsheets (Taken 6/17/2022 2200)  Absence of Physical Injury: Implement safety measures based on patient assessment  6/17/2022 1608 by Matthew Cain RN  Outcome: Progressing  Flowsheets (Taken 6/17/2022 0935)  Absence of Physical Injury: Implement safety measures based on patient assessment     Problem: Pain  Goal: Verbalizes/displays adequate comfort level or baseline comfort level  6/17/2022 2215 by Leeann Rashid RN  Outcome: Progressing  Flowsheets (Taken 6/17/2022 2000)  Verbalizes/displays adequate comfort level or baseline comfort level: Encourage patient to monitor pain and request assistance  6/17/2022 1608 by Matthew Cain RN  Outcome: Progressing     Problem: Skin/Tissue Integrity  Goal: Absence of new skin breakdown  Description: 1. Monitor for areas of redness and/or skin breakdown  2. Assess vascular access sites hourly  3.   Every 4-6 hours minimum:  Change oxygen saturation probe site  4. Every 4-6 hours:  If on nasal continuous positive airway pressure, respiratory therapy assess nares and determine need for appliance change or resting period.   6/17/2022 2215 by Sharonda Patel RN  Outcome: Progressing  6/17/2022 1608 by Guille Chin RN  Outcome: Progressing

## 2022-06-19 LAB
ABSOLUTE EOS #: 0.21 K/UL (ref 0–0.44)
ABSOLUTE IMMATURE GRANULOCYTE: 0.04 K/UL (ref 0–0.3)
ABSOLUTE LYMPH #: 2.15 K/UL (ref 1.1–3.7)
ABSOLUTE MONO #: 0.68 K/UL (ref 0.1–1.2)
ANION GAP SERPL CALCULATED.3IONS-SCNC: 11 MMOL/L (ref 9–17)
BASOPHILS # BLD: 1 % (ref 0–2)
BASOPHILS ABSOLUTE: 0.06 K/UL (ref 0–0.2)
BUN BLDV-MCNC: 6 MG/DL (ref 6–20)
CALCIUM SERPL-MCNC: 9.2 MG/DL (ref 8.6–10.4)
CHLORIDE BLD-SCNC: 100 MMOL/L (ref 98–107)
CO2: 23 MMOL/L (ref 20–31)
CREAT SERPL-MCNC: 0.54 MG/DL (ref 0.7–1.2)
EOSINOPHILS RELATIVE PERCENT: 2 % (ref 1–4)
GFR AFRICAN AMERICAN: >60 ML/MIN
GFR NON-AFRICAN AMERICAN: >60 ML/MIN
GFR SERPL CREATININE-BSD FRML MDRD: ABNORMAL ML/MIN/{1.73_M2}
GLUCOSE BLD-MCNC: 89 MG/DL (ref 70–99)
HCT VFR BLD CALC: 41.9 % (ref 40.7–50.3)
HEMOGLOBIN: 12.9 G/DL (ref 13–17)
IMMATURE GRANULOCYTES: 0 %
LYMPHOCYTES # BLD: 23 % (ref 24–43)
MCH RBC QN AUTO: 30.1 PG (ref 25.2–33.5)
MCHC RBC AUTO-ENTMCNC: 30.8 G/DL (ref 28.4–34.8)
MCV RBC AUTO: 97.9 FL (ref 82.6–102.9)
MONOCYTES # BLD: 7 % (ref 3–12)
NRBC AUTOMATED: 0 PER 100 WBC
PDW BLD-RTO: 14.6 % (ref 11.8–14.4)
PLATELET # BLD: 337 K/UL (ref 138–453)
PMV BLD AUTO: 9.4 FL (ref 8.1–13.5)
POTASSIUM SERPL-SCNC: 4.5 MMOL/L (ref 3.7–5.3)
RBC # BLD: 4.28 M/UL (ref 4.21–5.77)
RBC # BLD: ABNORMAL 10*6/UL
SEG NEUTROPHILS: 67 % (ref 36–65)
SEGMENTED NEUTROPHILS ABSOLUTE COUNT: 6.21 K/UL (ref 1.5–8.1)
SODIUM BLD-SCNC: 134 MMOL/L (ref 135–144)
WBC # BLD: 9.4 K/UL (ref 3.5–11.3)

## 2022-06-19 PROCEDURE — 36415 COLL VENOUS BLD VENIPUNCTURE: CPT

## 2022-06-19 PROCEDURE — 6370000000 HC RX 637 (ALT 250 FOR IP): Performed by: STUDENT IN AN ORGANIZED HEALTH CARE EDUCATION/TRAINING PROGRAM

## 2022-06-19 PROCEDURE — 85025 COMPLETE CBC W/AUTO DIFF WBC: CPT

## 2022-06-19 PROCEDURE — 80048 BASIC METABOLIC PNL TOTAL CA: CPT

## 2022-06-19 PROCEDURE — 2580000003 HC RX 258: Performed by: STUDENT IN AN ORGANIZED HEALTH CARE EDUCATION/TRAINING PROGRAM

## 2022-06-19 PROCEDURE — 6360000002 HC RX W HCPCS: Performed by: STUDENT IN AN ORGANIZED HEALTH CARE EDUCATION/TRAINING PROGRAM

## 2022-06-19 PROCEDURE — 2060000000 HC ICU INTERMEDIATE R&B

## 2022-06-19 PROCEDURE — 2580000003 HC RX 258: Performed by: SURGERY

## 2022-06-19 RX ORDER — OXYCODONE HYDROCHLORIDE 5 MG/1
5 TABLET ORAL EVERY 6 HOURS PRN
Status: DISCONTINUED | OUTPATIENT
Start: 2022-06-19 | End: 2022-06-21

## 2022-06-19 RX ORDER — OXYCODONE HYDROCHLORIDE 5 MG/1
10 TABLET ORAL EVERY 6 HOURS PRN
Status: DISCONTINUED | OUTPATIENT
Start: 2022-06-19 | End: 2022-06-19

## 2022-06-19 RX ORDER — ACETAMINOPHEN 325 MG/1
650 TABLET ORAL EVERY 4 HOURS PRN
Status: DISCONTINUED | OUTPATIENT
Start: 2022-06-19 | End: 2022-06-19

## 2022-06-19 RX ORDER — SODIUM CHLORIDE, SODIUM LACTATE, POTASSIUM CHLORIDE, CALCIUM CHLORIDE 600; 310; 30; 20 MG/100ML; MG/100ML; MG/100ML; MG/100ML
INJECTION, SOLUTION INTRAVENOUS CONTINUOUS
Status: DISCONTINUED | OUTPATIENT
Start: 2022-06-20 | End: 2022-06-21

## 2022-06-19 RX ORDER — OXYCODONE HYDROCHLORIDE 5 MG/1
10 TABLET ORAL EVERY 6 HOURS PRN
Status: DISCONTINUED | OUTPATIENT
Start: 2022-06-19 | End: 2022-06-21

## 2022-06-19 RX ADMIN — PRAVASTATIN SODIUM 40 MG: 20 TABLET ORAL at 21:41

## 2022-06-19 RX ADMIN — Medication 1250 MG: at 21:53

## 2022-06-19 RX ADMIN — SODIUM CHLORIDE: 9 INJECTION, SOLUTION INTRAVENOUS at 21:54

## 2022-06-19 RX ADMIN — OXYCODONE 10 MG: 5 TABLET ORAL at 16:43

## 2022-06-19 RX ADMIN — ACETAMINOPHEN 1000 MG: 500 TABLET ORAL at 09:06

## 2022-06-19 RX ADMIN — METOPROLOL TARTRATE 25 MG: 25 TABLET ORAL at 10:46

## 2022-06-19 RX ADMIN — SODIUM CHLORIDE, PRESERVATIVE FREE 10 ML: 5 INJECTION INTRAVENOUS at 21:41

## 2022-06-19 RX ADMIN — SODIUM CHLORIDE: 9 INJECTION, SOLUTION INTRAVENOUS at 10:36

## 2022-06-19 RX ADMIN — SODIUM CHLORIDE, PRESERVATIVE FREE 5 ML: 5 INJECTION INTRAVENOUS at 09:08

## 2022-06-19 RX ADMIN — ACETAMINOPHEN 1000 MG: 500 TABLET ORAL at 16:43

## 2022-06-19 RX ADMIN — PIPERACILLIN AND TAZOBACTAM 3375 MG: 3; .375 INJECTION, POWDER, FOR SOLUTION INTRAVENOUS at 21:55

## 2022-06-19 RX ADMIN — ACETAMINOPHEN 1000 MG: 500 TABLET ORAL at 23:57

## 2022-06-19 RX ADMIN — ACETAMINOPHEN 1000 MG: 500 TABLET ORAL at 00:32

## 2022-06-19 RX ADMIN — METOPROLOL TARTRATE 25 MG: 25 TABLET ORAL at 21:41

## 2022-06-19 RX ADMIN — SODIUM CHLORIDE: 9 INJECTION, SOLUTION INTRAVENOUS at 21:53

## 2022-06-19 RX ADMIN — Medication 1250 MG: at 10:38

## 2022-06-19 RX ADMIN — PIPERACILLIN AND TAZOBACTAM 4500 MG: 4; .5 INJECTION, POWDER, LYOPHILIZED, FOR SOLUTION INTRAVENOUS; PARENTERAL at 10:46

## 2022-06-19 RX ADMIN — Medication 81 MG: at 09:07

## 2022-06-19 RX ADMIN — SODIUM CHLORIDE: 9 INJECTION, SOLUTION INTRAVENOUS at 10:42

## 2022-06-19 ASSESSMENT — PAIN DESCRIPTION - DESCRIPTORS
DESCRIPTORS: ACHING;DISCOMFORT
DESCRIPTORS: ACHING;DISCOMFORT
DESCRIPTORS: BURNING

## 2022-06-19 ASSESSMENT — PAIN DESCRIPTION - PAIN TYPE
TYPE: CHRONIC PAIN;ACUTE PAIN
TYPE: ACUTE PAIN;CHRONIC PAIN
TYPE: ACUTE PAIN;CHRONIC PAIN

## 2022-06-19 ASSESSMENT — PAIN DESCRIPTION - ORIENTATION
ORIENTATION: RIGHT
ORIENTATION: RIGHT
ORIENTATION: RIGHT;LOWER

## 2022-06-19 ASSESSMENT — PAIN DESCRIPTION - FREQUENCY
FREQUENCY: CONTINUOUS
FREQUENCY: CONTINUOUS
FREQUENCY: INTERMITTENT

## 2022-06-19 ASSESSMENT — PAIN - FUNCTIONAL ASSESSMENT
PAIN_FUNCTIONAL_ASSESSMENT: ACTIVITIES ARE NOT PREVENTED

## 2022-06-19 ASSESSMENT — PAIN DESCRIPTION - LOCATION
LOCATION: FOOT

## 2022-06-19 ASSESSMENT — PAIN DESCRIPTION - ONSET
ONSET: ON-GOING
ONSET: ON-GOING
ONSET: GRADUAL

## 2022-06-19 ASSESSMENT — PAIN SCALES - GENERAL
PAINLEVEL_OUTOF10: 7
PAINLEVEL_OUTOF10: 7
PAINLEVEL_OUTOF10: 5
PAINLEVEL_OUTOF10: 0

## 2022-06-19 NOTE — PROGRESS NOTES
Vascular Surgery   Progress Note    PATIENT NAME: Don Heller     TODAY'S DATE: 6/19/2022     SUBJECTIVE:     Pt seen and examined at bedside. Afebrile, some HTN yesterday requiring hydralazine. Home lopressor resumed. No acute overnight events. Continues to endorse some chronic lower extremity pain bilaterally. OBJECTIVE:   VITALS:  BP (!) 162/70   Pulse 79   Temp 97.9 °F (36.6 °C) (Oral)   Resp 17   Ht 5' 8\" (1.727 m)   Wt 163 lb (73.9 kg)   SpO2 96%   BMI 24.78 kg/m²      INTAKE/OUTPUT:      Intake/Output Summary (Last 24 hours) at 6/19/2022 0906  Last data filed at 6/19/2022 1991  Gross per 24 hour   Intake 100 ml   Output 1900 ml   Net -1800 ml     PHYSICAL EXAM:  General Appearance: awake, alert, oriented, in no acute distress  HEENT:  Normocephalic, atraumatic, mucus membranes moist   Heart: Regular rate and rhythm  Lungs: normal effort with symmetric rise and fall of chest wall  Abdomen: soft, nondistended, nontender to palpation  Extremities: RLE - toe amputations, delayed capillary refill, no dopplerable signals, LLE - toe amputations, delayed capillary refill, no dopplerable signals. Right foot erythema with draining wound from between first and second toe amputation sites   Skin: Skin color, texture, turgor normal. No rashes or lesions. Data:  CBC:   Recent Labs     06/16/22  1647 06/17/22  1017 06/19/22  0801   WBC 7.6 11.6* 9.4   HGB 13.8 12.3* 12.9*    317 337     Chemistry:   Recent Labs     06/16/22  1647 06/17/22  1017 06/19/22  0801    133* 134*   K 4.0 3.5* 4.5    97* 100   CO2 20 21 23   GLUCOSE 139* 104* 89   BUN 4* 7 6   CREATININE 0.49* 0.56* 0.54*   ANIONGAP 15 15 11   LABGLOM >60 >60 >60   GFRAA >60 >60 >60   CALCIUM 9.0 8.7 9.2     Hepatic: No results for input(s): AST, ALT, ALB, ALKPHOS, BILITOT, BILIDIR in the last 72 hours. Coagulation:   Recent Labs     06/16/22  1140   INR 1.1       Radiology Review:    No results found.     ASSESSMENT:  Active Hospital Problems    Diagnosis Date Noted    PVD (peripheral vascular disease) ([facility] [address] [I73.9] 06/16/2022     Priority: Medium    S/P arteriogram of extremity [Z98.890] 06/16/2022     Priority: Medium     1. 59 y.o. male with severe PAD s/p b/l LE angiogram 6/16    Plan:  1. General diet, NPO at mn for possible surgery tomorrow   2. Keep RLE with dressing and ACE wrap to keep pressure off   3. Worsening erythema of right foot, small amount of drainage R foot. No leukocytosis. Will start broad spectrum abx   4.  Possible aortobifemoral bypass 6/20     Antoni Ohara,   General Surgery PGY-3

## 2022-06-19 NOTE — PROGRESS NOTES
[facility] Pharmacokinetic Monitoring Service - Vancomycin     Jaqueline Casanova is a 59 y.o. male starting on vancomycin therapy for skin/soft tissue infection. Pharmacy consulted by Raúl Smallwood  for monitoring and adjustment. Target Concentration: Goal trough of 10-15 mg/L and AUC/LULA <500 mg*hr/L    Additional Antimicrobials: zosyn    Pertinent Laboratory Values: Wt Readings from Last 1 Encounters:   06/16/22 163 lb (73.9 kg)     Temp Readings from Last 1 Encounters:   06/19/22 97.6 °F (36.4 °C) (Oral)     Estimated Creatinine Clearance: 137 mL/min (A) (based on SCr of 0.56 mg/dL (L)). Recent Labs     06/16/22  1647 06/16/22  1647 06/17/22  1017 06/19/22  0801   CREATININE 0.49*  --  0.56*  --    WBC 7.6   < > 11.6* 9.4    < > = values in this interval not displayed. Procalcitonin: N/A    Pertinent Cultures:  Culture Date Source Results   N/A N/A N/A   MRSA Nasal Swab: N/A. Non-respiratory infection.     Plan:  Dosing recommendations based on Bayesian software  Start vancomycin 1250 MG IVPB q12h  Anticipated AUC of 420 and trough concentration of 11 at steady state  Renal labs as indicated   Vancomycin concentration not ordered yet  Pharmacy will continue to monitor patient and adjust therapy as indicated    Thank you for the consult,  Burt Brice [facility]  6/19/2022 8:38 AM

## 2022-06-19 NOTE — PROGRESS NOTES
Pharmacy Note     Renal Dose Adjustment    Andre Oneal is a 59 y.o. male. Pharmacist assessment of renally cleared medications. Recent Labs     06/17/22  1017 06/19/22  0801   BUN 7 6       Recent Labs     06/17/22  1017 06/19/22  0801   CREATININE 0.56* 0.54*       Estimated Creatinine Clearance: 143 mL/min (A) (based on SCr of 0.54 mg/dL (L)).     Height:   Ht Readings from Last 1 Encounters:   06/16/22 5' 8\" (1.727 m)     Weight:  Wt Readings from Last 1 Encounters:   06/16/22 163 lb (73.9 kg)       The following medication dose has been adjusted based upon renal function per P&T Guidelines:             Zosyn dose adjusted to 3.375 g Q8H based on renal function & indication SSTI    Thank you,  Tai Barbosa, PharmD 6/19/2022 11:59 AM

## 2022-06-19 NOTE — PLAN OF CARE
Patient in no apparent distress at this time. No falls or new injuries noted. Will continue to monitor.

## 2022-06-20 ENCOUNTER — ANESTHESIA (OUTPATIENT)
Dept: OPERATING ROOM | Age: 57
End: 2022-06-20

## 2022-06-20 ENCOUNTER — APPOINTMENT (OUTPATIENT)
Dept: NUCLEAR MEDICINE | Age: 57
DRG: 253 | End: 2022-06-20
Attending: SURGERY
Payer: COMMERCIAL

## 2022-06-20 ENCOUNTER — ANESTHESIA EVENT (OUTPATIENT)
Dept: OPERATING ROOM | Age: 57
End: 2022-06-20

## 2022-06-20 LAB
LV EF: 62 %
LV EF: 66 %
LVEF MODALITY: NORMAL
LVEF MODALITY: NORMAL

## 2022-06-20 PROCEDURE — 93970 EXTREMITY STUDY: CPT

## 2022-06-20 PROCEDURE — 78452 HT MUSCLE IMAGE SPECT MULT: CPT

## 2022-06-20 PROCEDURE — 2580000003 HC RX 258: Performed by: SURGERY

## 2022-06-20 PROCEDURE — 6360000002 HC RX W HCPCS: Performed by: SURGERY

## 2022-06-20 PROCEDURE — 2060000000 HC ICU INTERMEDIATE R&B

## 2022-06-20 PROCEDURE — 6360000002 HC RX W HCPCS: Performed by: STUDENT IN AN ORGANIZED HEALTH CARE EDUCATION/TRAINING PROGRAM

## 2022-06-20 PROCEDURE — A9500 TC99M SESTAMIBI: HCPCS | Performed by: STUDENT IN AN ORGANIZED HEALTH CARE EDUCATION/TRAINING PROGRAM

## 2022-06-20 PROCEDURE — 2580000003 HC RX 258: Performed by: STUDENT IN AN ORGANIZED HEALTH CARE EDUCATION/TRAINING PROGRAM

## 2022-06-20 PROCEDURE — 6370000000 HC RX 637 (ALT 250 FOR IP): Performed by: STUDENT IN AN ORGANIZED HEALTH CARE EDUCATION/TRAINING PROGRAM

## 2022-06-20 PROCEDURE — 93017 CV STRESS TEST TRACING ONLY: CPT

## 2022-06-20 PROCEDURE — 93306 TTE W/DOPPLER COMPLETE: CPT

## 2022-06-20 PROCEDURE — 3430000000 HC RX DIAGNOSTIC RADIOPHARMACEUTICAL: Performed by: STUDENT IN AN ORGANIZED HEALTH CARE EDUCATION/TRAINING PROGRAM

## 2022-06-20 RX ORDER — AMINOPHYLLINE DIHYDRATE 25 MG/ML
50 INJECTION, SOLUTION INTRAVENOUS PRN
Status: DISCONTINUED | OUTPATIENT
Start: 2022-06-20 | End: 2022-06-20

## 2022-06-20 RX ORDER — SODIUM CHLORIDE 0.9 % (FLUSH) 0.9 %
5-40 SYRINGE (ML) INJECTION PRN
Status: DISCONTINUED | OUTPATIENT
Start: 2022-06-20 | End: 2022-06-20

## 2022-06-20 RX ORDER — SODIUM CHLORIDE 0.9 % (FLUSH) 0.9 %
10 SYRINGE (ML) INJECTION PRN
Status: DISCONTINUED | OUTPATIENT
Start: 2022-06-20 | End: 2022-06-23

## 2022-06-20 RX ORDER — SODIUM CHLORIDE 0.9 % (FLUSH) 0.9 %
10 SYRINGE (ML) INJECTION PRN
Status: DISCONTINUED | OUTPATIENT
Start: 2022-06-20 | End: 2022-06-28 | Stop reason: HOSPADM

## 2022-06-20 RX ORDER — SODIUM CHLORIDE 9 MG/ML
500 INJECTION, SOLUTION INTRAVENOUS CONTINUOUS PRN
Status: DISCONTINUED | OUTPATIENT
Start: 2022-06-20 | End: 2022-06-20

## 2022-06-20 RX ORDER — NITROGLYCERIN 0.4 MG/1
0.4 TABLET SUBLINGUAL EVERY 5 MIN PRN
Status: DISCONTINUED | OUTPATIENT
Start: 2022-06-20 | End: 2022-06-20

## 2022-06-20 RX ORDER — ATROPINE SULFATE 0.1 MG/ML
0.5 INJECTION INTRAVENOUS EVERY 5 MIN PRN
Status: DISCONTINUED | OUTPATIENT
Start: 2022-06-20 | End: 2022-06-20

## 2022-06-20 RX ORDER — ALBUTEROL SULFATE 90 UG/1
2 AEROSOL, METERED RESPIRATORY (INHALATION) PRN
Status: DISCONTINUED | OUTPATIENT
Start: 2022-06-20 | End: 2022-06-20

## 2022-06-20 RX ORDER — METOPROLOL TARTRATE 5 MG/5ML
5 INJECTION INTRAVENOUS EVERY 5 MIN PRN
Status: DISCONTINUED | OUTPATIENT
Start: 2022-06-20 | End: 2022-06-20

## 2022-06-20 RX ADMIN — SODIUM CHLORIDE, POTASSIUM CHLORIDE, SODIUM LACTATE AND CALCIUM CHLORIDE: 600; 310; 30; 20 INJECTION, SOLUTION INTRAVENOUS at 00:03

## 2022-06-20 RX ADMIN — PIPERACILLIN AND TAZOBACTAM 3375 MG: 3; .375 INJECTION, POWDER, FOR SOLUTION INTRAVENOUS at 03:06

## 2022-06-20 RX ADMIN — OXYCODONE 10 MG: 5 TABLET ORAL at 23:55

## 2022-06-20 RX ADMIN — Medication 81 MG: at 09:16

## 2022-06-20 RX ADMIN — METOPROLOL TARTRATE 25 MG: 25 TABLET ORAL at 20:34

## 2022-06-20 RX ADMIN — SODIUM CHLORIDE, PRESERVATIVE FREE 10 ML: 5 INJECTION INTRAVENOUS at 10:15

## 2022-06-20 RX ADMIN — HYDRALAZINE HYDROCHLORIDE 10 MG: 20 INJECTION INTRAMUSCULAR; INTRAVENOUS at 10:28

## 2022-06-20 RX ADMIN — Medication 1250 MG: at 09:18

## 2022-06-20 RX ADMIN — TETRAKIS(2-METHOXYISOBUTYLISOCYANIDE)COPPER(I) TETRAFLUOROBORATE 40 MILLICURIE: 1 INJECTION, POWDER, LYOPHILIZED, FOR SOLUTION INTRAVENOUS at 10:18

## 2022-06-20 RX ADMIN — ACETAMINOPHEN 1000 MG: 500 TABLET ORAL at 09:16

## 2022-06-20 RX ADMIN — PIPERACILLIN AND TAZOBACTAM 3375 MG: 3; .375 INJECTION, POWDER, FOR SOLUTION INTRAVENOUS at 09:23

## 2022-06-20 RX ADMIN — Medication 1250 MG: at 20:36

## 2022-06-20 RX ADMIN — SODIUM CHLORIDE, PRESERVATIVE FREE 10 ML: 5 INJECTION INTRAVENOUS at 08:25

## 2022-06-20 RX ADMIN — REGADENOSON 0.4 MG: 0.08 INJECTION, SOLUTION INTRAVENOUS at 10:16

## 2022-06-20 RX ADMIN — SODIUM CHLORIDE, PRESERVATIVE FREE 10 ML: 5 INJECTION INTRAVENOUS at 10:18

## 2022-06-20 RX ADMIN — TETRAKIS(2-METHOXYISOBUTYLISOCYANIDE)COPPER(I) TETRAFLUOROBORATE 14.7 MILLICURIE: 1 INJECTION, POWDER, LYOPHILIZED, FOR SOLUTION INTRAVENOUS at 08:25

## 2022-06-20 RX ADMIN — SODIUM CHLORIDE, PRESERVATIVE FREE 5 ML: 5 INJECTION INTRAVENOUS at 09:17

## 2022-06-20 RX ADMIN — OXYCODONE 10 MG: 5 TABLET ORAL at 16:08

## 2022-06-20 RX ADMIN — PRAVASTATIN SODIUM 40 MG: 20 TABLET ORAL at 20:34

## 2022-06-20 RX ADMIN — ACETAMINOPHEN 1000 MG: 500 TABLET ORAL at 16:08

## 2022-06-20 RX ADMIN — SODIUM CHLORIDE, PRESERVATIVE FREE 10 ML: 5 INJECTION INTRAVENOUS at 10:19

## 2022-06-20 RX ADMIN — OXYCODONE 10 MG: 5 TABLET ORAL at 05:40

## 2022-06-20 RX ADMIN — PIPERACILLIN AND TAZOBACTAM 3375 MG: 3; .375 INJECTION, POWDER, FOR SOLUTION INTRAVENOUS at 18:11

## 2022-06-20 ASSESSMENT — PAIN DESCRIPTION - ONSET
ONSET: ON-GOING
ONSET: ON-GOING
ONSET: PROGRESSIVE

## 2022-06-20 ASSESSMENT — PAIN SCALES - GENERAL
PAINLEVEL_OUTOF10: 7
PAINLEVEL_OUTOF10: 0

## 2022-06-20 ASSESSMENT — PAIN DESCRIPTION - DESCRIPTORS
DESCRIPTORS: BURNING
DESCRIPTORS: ACHING
DESCRIPTORS: ACHING

## 2022-06-20 ASSESSMENT — PAIN DESCRIPTION - LOCATION
LOCATION: FOOT
LOCATION: FOOT
LOCATION: LEG

## 2022-06-20 ASSESSMENT — PAIN - FUNCTIONAL ASSESSMENT
PAIN_FUNCTIONAL_ASSESSMENT: ACTIVITIES ARE NOT PREVENTED
PAIN_FUNCTIONAL_ASSESSMENT: PREVENTS OR INTERFERES SOME ACTIVE ACTIVITIES AND ADLS
PAIN_FUNCTIONAL_ASSESSMENT: PREVENTS OR INTERFERES SOME ACTIVE ACTIVITIES AND ADLS

## 2022-06-20 ASSESSMENT — PAIN DESCRIPTION - ORIENTATION
ORIENTATION: RIGHT
ORIENTATION: RIGHT;LOWER
ORIENTATION: RIGHT

## 2022-06-20 ASSESSMENT — PAIN DESCRIPTION - FREQUENCY
FREQUENCY: CONTINUOUS

## 2022-06-20 ASSESSMENT — PAIN DESCRIPTION - PAIN TYPE
TYPE: ACUTE PAIN
TYPE: ACUTE PAIN;CHRONIC PAIN
TYPE: ACUTE PAIN

## 2022-06-20 NOTE — ANESTHESIA PRE PROCEDURE
Department of Anesthesiology  Preprocedure Note       Name:  Katy Doty   Age:  59 y.o.  :  1965                                          MRN:  [id number]         Date:  2022      Surgeon: Garret Hamilton):  Ashley Mahoney MD    Procedure: Procedure(s):  AORTO-BIFEMORAL BYPASS  ** CELL SAVER**    Medications prior to admission:   Prior to Admission medications    Medication Sig Start Date End Date Taking? Authorizing Provider   doxycycline hyclate (VIBRA-TABS) 100 MG tablet Take 1 tablet by mouth 2 times daily for 14 days  Patient not taking: Reported on 2022 6/10/22 6/24/22  Ashley Mahoney MD   ibuprofen (ADVIL;MOTRIN) 800 MG tablet Take 1 tablet by mouth every 8 hours as needed for Pain  Patient not taking: Reported on 2022 3/17/22   Tasia Sheth MD   acetaminophen (TYLENOL) 500 MG tablet Take 2 tablets by mouth every 8 hours as needed for Pain 3/17/22   Tasia Sheth MD   pravastatin (PRAVACHOL) 40 MG tablet Take 40 mg by mouth daily    Historical Provider, MD   loratadine (CLARITIN) 10 MG tablet Take 10 mg by mouth daily  Patient not taking: Reported on 2022    Historical Provider, MD   sulfamethoxazole-trimethoprim (BACTRIM DS;SEPTRA DS) 800-160 MG per tablet Take 1 tablet by mouth 2 times daily  Patient not taking: Reported on 2022    Historical Provider, MD   Misc. Devices MISC Rx: Please dispense one Cane. 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 Difficulty ambulating  Sig: Use cane as needed to assist in ambulating. Patient not taking: Reported on 2022 11/9/15   Rosie Gotti DPM   [place]. Devices MISC Please dispense a 30 day supply of the following:  4x4 gauze, Kerlix rolls, and paper tape. 11/9/15   Rosie Gotti DPM   polyethylene glycol (GLYCOLAX) packet Take 17 g by mouth daily as needed. Patient not taking: Reported on 2022    Historical Provider, MD   senna (SENOKOT) 8.6 MG tablet Take 1 tablet by mouth nightly.   Patient not taking: Reported on 6/16/2022    Historical Provider, MD   silver sulfADIAZINE (SILVADENE) 1 % cream Apply topically daily. Patient not taking: Reported on 6/16/2022 2/26/13   Thiago Rizvi MD   Gauze Pads & Dressings ([place]) [address] [facility] by Does not apply route. Patient not taking: Reported on 6/16/2022 2/26/13   Thiago Rizvi MD   doxycycline (VIBRAMYCIN) 100 MG capsule Take 100 mg by mouth 2 times daily. Historical Provider, MD   metoprolol (LOPRESSOR) 25 MG tablet Take 25 mg by mouth 2 times daily. Historical Provider, MD   clopidogrel (PLAVIX) 75 MG tablet Take 75 mg by mouth daily. Historical Provider, MD   aspirin 81 MG tablet Take 81 mg by mouth daily. Historical Provider, MD   oxyCODONE-acetaminophen (PERCOCET) 5-325 MG per tablet Take 1 tablet by mouth. Historical Provider, MD   folic acid (FOLVITE) 1 MG tablet Take 1 mg by mouth daily. Historical Provider, MD   multivitamin [facility]) per tablet Take 1 tablet by mouth daily. Patient not taking: Reported on 6/16/2022    Historical Provider, MD   simvastatin (ZOCOR) 10 MG tablet Take 10 mg by mouth nightly.     Historical Provider, MD       Current medications:    Current Facility-Administered Medications   Medication Dose Route Frequency Provider Last Rate Last Admin    oxyCODONE (ROXICODONE) immediate release tablet 5 mg  5 mg Oral Q6H PRN Flor Evans, DO        Or    oxyCODONE (ROXICODONE) immediate release tablet 10 mg  10 mg Oral Q6H PRN Flor Norridge, DO   10 mg at 06/20/22 0540    vancomycin (VANCOCIN) intermittent dosing (placeholder)   Other RX Placeholder Flor Norridge, DO        vancomycin (VANCOCIN) 1250 mg in sodium chloride 0.9% 250 mL IVPB  1,250 mg IntraVENous Q12H Flor Evans, DO   Stopped at 06/20/22 0000    metoprolol tartrate (LOPRESSOR) tablet 25 mg  25 mg Oral BID Flor Norridge, DO   25 mg at 06/19/22 2141    lactated ringers infusion   IntraVENous Continuous Flor Norridge, DO 100 mL/hr at 06/20/22 0003 New Bag at 06/20/22 0003    piperacillin-tazobactam (ZOSYN) 3,375 mg in dextrose 5 % 50 mL IVPB (mini-bag)  3,375 mg IntraVENous Q8H Flor Evans, DO 12.5 mL/hr at 06/20/22 0306 3,375 mg at 06/20/22 0306    hydrALAZINE (APRESOLINE) injection 10 mg  10 mg IntraVENous Q4H PRN Sandra Gann MD        acetaminophen (TYLENOL) tablet 1,000 mg  1,000 mg Oral q8h Flor Humphreys, DO   1,000 mg at 06/19/22 2357    ibuprofen (ADVIL;MOTRIN) tablet 400 mg  400 mg Oral Q6H PRN Flor Humphreys, DO        sodium chloride flush 0.9 % injection 5-40 mL  5-40 mL IntraVENous 2 times per day Raulito Riddle MD   5 mL at 06/19/22 0908    sodium chloride flush 0.9 % injection 5-40 mL  5-40 mL IntraVENous PRN Raulito Riddle MD        0.9 % sodium chloride infusion   IntraVENous PRN Raulito Riddle MD 10 mL/hr at 06/19/22 2154 New Bag at 06/19/22 2154    sodium chloride flush 0.9 % injection 5-40 mL  5-40 mL IntraVENous 2 times per day Flor Humphreys, DO   10 mL at 06/19/22 2141    sodium chloride flush 0.9 % injection 5-40 mL  5-40 mL IntraVENous PRN Flor Evans, DO   10 mL at 06/17/22 0759    0.9 % sodium chloride infusion   IntraVENous PRN Flor Evans, DO 10 mL/hr at 06/19/22 2153 New Bag at 06/19/22 2153    ondansetron (ZOFRAN-ODT) disintegrating tablet 4 mg  4 mg Oral Q8H PRN Flor Humphreys, DO        Or    ondansetron (ZOFRAN) injection 4 mg  4 mg IntraVENous Q6H PRN Flor Evans, DO        enoxaparin (LOVENOX) injection 40 mg  40 mg SubCUTAneous Daily Flor Humphreys, DO   40 mg at 06/18/22 0904    aspirin EC tablet 81 mg  81 mg Oral Daily Flor Evans, DO   81 mg at 06/19/22 0907    pravastatin (PRAVACHOL) tablet 40 mg  40 mg Oral Daily Flor Humphreys, DO   40 mg at 06/19/22 4511    [Held by provider] clopidogrel (PLAVIX) tablet 75 mg  75 mg Oral Daily Flor Humphreys, DO   75 mg at 06/17/22 8647       Allergies:     Allergies   Allergen Reactions    Shellfish-Derived Products Diarrhea       Problem List:    Patient Active Problem List   Diagnosis Code    Frostbite of foot T33.829A    Frostbite with tissue necrosis T34.90XA    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, right leg ([facility] I70.261    PVD (peripheral vascular disease) ([facility] I73.9    S/P arteriogram of extremity Z98.890       Past Medical History:        Diagnosis Date    Alcohol abuse     CAD (coronary artery disease)     Convulsions ([facility]     Frostbite     Hypertension        Past Surgical History:        Procedure Laterality Date    TOE AMPUTATION      left foot       Social History:    Social History     Tobacco Use    Smoking status: Current Every Day Smoker     Packs/day: 1.00     Types: Cigarettes    Smokeless tobacco: Never Used   Substance Use Topics    Alcohol use: Yes     Alcohol/week: 12.0 standard drinks     Types: 12 Cans of beer per week     Comment: approx 12 pack daily                                Ready to quit: Not Answered  Counseling given: Not Answered      Vital Signs (Current):   Vitals:    06/20/22 0535 06/20/22 0536 06/20/22 0540 06/20/22 0630   BP: (!) 160/69   (!) 158/56   Pulse: 61   55   Resp: 15  18 16   Temp: 98.8 °F (37.1 °C)   98.6 °F (37 °C)   TempSrc: Oral   Oral   SpO2:  98%     Weight:       Height:                                                  BP Readings from Last 3 Encounters:   06/20/22 (!) 158/56   06/10/22 133/78   04/08/22 (!) 168/86       NPO Status:                                                                                 BMI:   Wt Readings from Last 3 Encounters:   06/16/22 163 lb (73.9 kg)   06/10/22 167 lb (75.8 kg)   04/08/22 174 lb (78.9 kg)     Body mass index is 24.78 kg/m².     CBC:   Lab Results   Component Value Date    WBC 9.4 06/19/2022    RBC 4.28 06/19/2022    RBC 4.46 05/28/2012    HGB 12.9 06/19/2022    HCT 41.9 06/19/2022    MCV 97.9 06/19/2022    RDW 14.6 06/19/2022     06/19/2022    PLT 214 05/28/2012       CMP:   Lab Results   Component Value Date     06/19/2022    K 4.5 06/19/2022     06/19/2022    CO2 23 06/19/2022    BUN 6 06/19/2022    CREATININE 0.54 06/19/2022    GFRAA >60 06/19/2022    LABGLOM >60 06/19/2022    GLUCOSE 89 06/19/2022    GLUCOSE 85 05/28/2012    PROT 7.1 05/30/2013    CALCIUM 9.2 06/19/2022    BILITOT 0.18 05/27/2012    ALKPHOS 130 05/27/2012    AST 42 05/27/2012    ALT 24 05/27/2012       POC Tests: No results for input(s): POCGLU, POCNA, POCK, POCCL, POCBUN, POCHEMO, POCHCT in the last 72 hours. Coags:   Lab Results   Component Value Date    PROTIME 13.4 06/16/2022    INR 1.1 06/16/2022    INR 1.1 09/20/2012    APTT 31.7 05/28/2012       HCG (If Applicable): No results found for: PREGTESTUR, PREGSERUM, HCG, HCGQUANT     ABGs: No results found for: PHART, PO2ART, NSN6KZS, FYD1ROL, BEART, U4OAUXSC     Type & Screen (If Applicable):  No results found for: LABABO, LABRH    Drug/Infectious Status (If Applicable):  No results found for: HIV, HEPCAB    COVID-19 Screening (If Applicable): No results found for: COVID19        Anesthesia Evaluation  Patient summary reviewed no history of anesthetic complications:   Airway: Mallampati: II          Dental:          Pulmonary:Negative Pulmonary ROS and normal exam  breath sounds clear to auscultation                             Cardiovascular:    (+) hypertension:, CAD:,         Rhythm: regular  Rate: normal                    Neuro/Psych:   (+) psychiatric history:            GI/Hepatic/Renal: Neg GI/Hepatic/Renal ROS            Endo/Other: Negative Endo/Other ROS                    Abdominal:             Vascular:   + PVD, aortic or cerebral, . Other Findings:           Anesthesia Plan      general     ASA 4       Induction: intravenous. MIPS: Postoperative opioids intended, Prophylactic antiemetics administered and Postoperative trial extubation. Anesthetic plan and risks discussed with patient.       Plan discussed with CRNA. Result Date: 5/5/2022   Left Ventricle: Systolic function is normal with an ejection fraction of 60-65%.  No significant valvular stenosis or regurgitation.            Anuja López MD   6/20/2022

## 2022-06-20 NOTE — PROGRESS NOTES
Vascular Surgery   Progress Note    PATIENT NAME: Prosper Sanchez     TODAY'S DATE: 6/20/2022     SUBJECTIVE:     Pt seen and examined at bedside. Afebrile, complains of throbbing pain in R foot, denies pain to LLE. Patient NPO since MDN. Plan for cardiac stress test today. OBJECTIVE:   VITALS:  BP (!) 160/69   Pulse 61   Temp 98.8 °F (37.1 °C) (Oral)   Resp 18   Ht 5' 8\" (1.727 m)   Wt 163 lb (73.9 kg)   SpO2 98%   BMI 24.78 kg/m²      INTAKE/OUTPUT:      Intake/Output Summary (Last 24 hours) at 6/20/2022 0617  Last data filed at 6/20/2022 9807  Gross per 24 hour   Intake 1330 ml   Output 2325 ml   Net -995 ml     PHYSICAL EXAM:  General Appearance: awake, alert, oriented, in no acute distress  HEENT:  Normocephalic, atraumatic, mucus membranes moist   Heart: Sinu bradycardia  Lungs: normal effort with symmetric rise and fall of chest wall  Abdomen: soft, nondistended, nontender to palpation  Extremities: RLE - toe amputations, delayed capillary refill, monophasic PT signals, LLE - toe amputations, delayed capillary refill, no dopplerable signals. Right foot erythema with draining wound from between first and second toe amputation sites   Skin: Skin color, texture, turgor normal. No rashes or lesions. Data:  CBC:   Recent Labs     06/17/22  1017 06/19/22  0801   WBC 11.6* 9.4   HGB 12.3* 12.9*    337     Chemistry:   Recent Labs     06/17/22  1017 06/19/22  0801   * 134*   K 3.5* 4.5   CL 97* 100   CO2 21 23   GLUCOSE 104* 89   BUN 7 6   CREATININE 0.56* 0.54*   ANIONGAP 15 11   LABGLOM >60 >60   GFRAA >60 >60   CALCIUM 8.7 9.2     Hepatic: No results for input(s): AST, ALT, ALB, ALKPHOS, BILITOT, BILIDIR in the last 72 hours. Coagulation:   No results for input(s): APTT, PROT, INR in the last 72 hours. Radiology Review:    No results found.     ASSESSMENT:  Active Hospital Problems    Diagnosis Date Noted    PVD (peripheral vascular disease) ([address] [I73.9] 06/16/2022     Priority: Medium  S/P arteriogram of extremity [Z98.890] 06/16/2022     Priority: Medium     1. 59 y.o. male with severe PAD s/p b/l LE angiogram 6/16    Plan:  1. NPO until after cardiac stress test  2. Lexiscan stress test to be done today  3. Obtain BLE vein mapping  4. Keep RLE with dressing and ACE wrap to keep pressure off   1. Will change dressing today  5. Worsening erythema of right foot, small amount of drainage R foot  1. Vancomycin and zosyn started empirically  6.  Possible aortobifemoral bypass 6/23/22    Electronically signed by Ailin Easton DO on 6/20/2022 at 6:18 AM

## 2022-06-20 NOTE — PROCEDURES
[address]                  [facility]                              CARDIAC STRESS TEST    PATIENT NAME: Mariluz Sellers                       :        1965  MED REC NO:   [id number]                             ROOM:         ACCOUNT NO:   [de-identified]                           ADMIT DATE: 2022  PROVIDER:     Oscar Harvey    DATE OF STUDY:  2022    ORDERING PROVIDER: Dr. Andie Andrade    INTERPRETING PHYSICIAN: Dr. Koch Minium:    Indication: Pre-Op    Procedure explained and consent signed. Medications: Lexiscan, 0.4 mg  Resting Heart rate: 55 bpm  Resting blood pressure:  174/100 mm/Hg  Infusion heart rate:  93 bpm  Infusion blood pressure:  194/89 mm/Hg  Resting EKG: Conduction Abnormalities, Sinus bradycardia  Stress heart response: Normal Response  Stress BP response: Appropriate  Stress EKG(S): No changes seen  Chest discomfort: None  Ischemic EKG changes: None    IMPRESSION:  Electrocardiographically Negative Lexiscan stress study. Radio-isotope  results to follow from the department of Nuclear Medicine.              John Magana    D: 2022 15:12:47       T: 2022 15:14:40     [id number]  Job#: [id number]     Doc#: Unknown    CC:

## 2022-06-21 LAB — VANCOMYCIN RANDOM: 15.7 UG/ML

## 2022-06-21 PROCEDURE — 80202 ASSAY OF VANCOMYCIN: CPT

## 2022-06-21 PROCEDURE — 6360000002 HC RX W HCPCS: Performed by: STUDENT IN AN ORGANIZED HEALTH CARE EDUCATION/TRAINING PROGRAM

## 2022-06-21 PROCEDURE — 6370000000 HC RX 637 (ALT 250 FOR IP): Performed by: STUDENT IN AN ORGANIZED HEALTH CARE EDUCATION/TRAINING PROGRAM

## 2022-06-21 PROCEDURE — 36415 COLL VENOUS BLD VENIPUNCTURE: CPT

## 2022-06-21 PROCEDURE — 2060000000 HC ICU INTERMEDIATE R&B

## 2022-06-21 PROCEDURE — 2580000003 HC RX 258: Performed by: SURGERY

## 2022-06-21 PROCEDURE — 2580000003 HC RX 258: Performed by: STUDENT IN AN ORGANIZED HEALTH CARE EDUCATION/TRAINING PROGRAM

## 2022-06-21 PROCEDURE — 6360000002 HC RX W HCPCS: Performed by: SURGERY

## 2022-06-21 RX ORDER — OXYCODONE HYDROCHLORIDE 5 MG/1
10 TABLET ORAL EVERY 8 HOURS PRN
Status: DISCONTINUED | OUTPATIENT
Start: 2022-06-21 | End: 2022-06-26

## 2022-06-21 RX ORDER — OXYCODONE HYDROCHLORIDE 5 MG/1
5 TABLET ORAL EVERY 8 HOURS PRN
Status: DISCONTINUED | OUTPATIENT
Start: 2022-06-21 | End: 2022-06-26

## 2022-06-21 RX ADMIN — SODIUM CHLORIDE, POTASSIUM CHLORIDE, SODIUM LACTATE AND CALCIUM CHLORIDE: 600; 310; 30; 20 INJECTION, SOLUTION INTRAVENOUS at 01:00

## 2022-06-21 RX ADMIN — SODIUM CHLORIDE, PRESERVATIVE FREE 5 ML: 5 INJECTION INTRAVENOUS at 08:43

## 2022-06-21 RX ADMIN — ACETAMINOPHEN 1000 MG: 500 TABLET ORAL at 23:59

## 2022-06-21 RX ADMIN — PIPERACILLIN AND TAZOBACTAM 3375 MG: 3; .375 INJECTION, POWDER, FOR SOLUTION INTRAVENOUS at 02:30

## 2022-06-21 RX ADMIN — SODIUM CHLORIDE: 9 INJECTION, SOLUTION INTRAVENOUS at 21:38

## 2022-06-21 RX ADMIN — PRAVASTATIN SODIUM 40 MG: 20 TABLET ORAL at 19:57

## 2022-06-21 RX ADMIN — PIPERACILLIN AND TAZOBACTAM 3375 MG: 3; .375 INJECTION, POWDER, FOR SOLUTION INTRAVENOUS at 17:44

## 2022-06-21 RX ADMIN — OXYCODONE 10 MG: 5 TABLET ORAL at 17:43

## 2022-06-21 RX ADMIN — METOPROLOL TARTRATE 25 MG: 25 TABLET ORAL at 08:44

## 2022-06-21 RX ADMIN — Medication 1250 MG: at 08:50

## 2022-06-21 RX ADMIN — ACETAMINOPHEN 1000 MG: 500 TABLET ORAL at 08:46

## 2022-06-21 RX ADMIN — ACETAMINOPHEN 1000 MG: 500 TABLET ORAL at 17:44

## 2022-06-21 RX ADMIN — Medication 81 MG: at 08:44

## 2022-06-21 RX ADMIN — METOPROLOL TARTRATE 25 MG: 25 TABLET ORAL at 19:57

## 2022-06-21 RX ADMIN — PIPERACILLIN AND TAZOBACTAM 3375 MG: 3; .375 INJECTION, POWDER, FOR SOLUTION INTRAVENOUS at 08:54

## 2022-06-21 RX ADMIN — Medication 1250 MG: at 21:40

## 2022-06-21 RX ADMIN — OXYCODONE 10 MG: 5 TABLET ORAL at 08:46

## 2022-06-21 RX ADMIN — HYDRALAZINE HYDROCHLORIDE 10 MG: 20 INJECTION INTRAMUSCULAR; INTRAVENOUS at 04:50

## 2022-06-21 ASSESSMENT — PAIN DESCRIPTION - ORIENTATION
ORIENTATION: RIGHT
ORIENTATION: RIGHT

## 2022-06-21 ASSESSMENT — PAIN DESCRIPTION - PAIN TYPE
TYPE: CHRONIC PAIN
TYPE: ACUTE PAIN

## 2022-06-21 ASSESSMENT — PAIN SCALES - GENERAL
PAINLEVEL_OUTOF10: 0
PAINLEVEL_OUTOF10: 6
PAINLEVEL_OUTOF10: 7
PAINLEVEL_OUTOF10: 6

## 2022-06-21 ASSESSMENT — PAIN DESCRIPTION - ONSET: ONSET: ON-GOING

## 2022-06-21 ASSESSMENT — PAIN - FUNCTIONAL ASSESSMENT
PAIN_FUNCTIONAL_ASSESSMENT: PREVENTS OR INTERFERES SOME ACTIVE ACTIVITIES AND ADLS
PAIN_FUNCTIONAL_ASSESSMENT: PREVENTS OR INTERFERES SOME ACTIVE ACTIVITIES AND ADLS

## 2022-06-21 ASSESSMENT — PAIN DESCRIPTION - FREQUENCY: FREQUENCY: CONTINUOUS

## 2022-06-21 ASSESSMENT — PAIN DESCRIPTION - LOCATION
LOCATION: FOOT
LOCATION: FOOT

## 2022-06-21 ASSESSMENT — PAIN DESCRIPTION - DESCRIPTORS: DESCRIPTORS: ACHING

## 2022-06-21 NOTE — PROGRESS NOTES
Vascular Surgery   Progress Note    PATIENT NAME: Negro Espinoza     TODAY'S DATE: 6/21/2022     SUBJECTIVE:     Pt seen and examined at bedside. Afebrile, complains of throbbing pain in R foot that is improved with dependent positioning, patient otherwise no complaints, denies CP or SOB. Cardiac stress test negative for reversible ischemia. Vein mapping with good bypass conduit GSV identified bilaterally. OBJECTIVE:   VITALS:  BP (!) 169/78   Pulse 57   Temp 97.9 °F (36.6 °C) (Oral)   Resp 16   Ht 5' 8\" (1.727 m)   Wt 163 lb (73.9 kg)   SpO2 99%   BMI 24.78 kg/m²      INTAKE/OUTPUT:      Intake/Output Summary (Last 24 hours) at 6/21/2022 4869  Last data filed at 6/21/2022 0445  Gross per 24 hour   Intake 760 ml   Output 2550 ml   Net -1790 ml     PHYSICAL EXAM:  General Appearance: awake, alert, oriented, in no acute distress  HEENT:  Normocephalic, atraumatic, mucus membranes moist   Heart: Sinu bradycardia  Lungs: normal effort with symmetric rise and fall of chest wall  Abdomen: soft, nondistended, nontender to palpation  Extremities: RLE - toe amputations, delayed capillary refill, monophasic PT signals, LLE - toe amputations, delayed capillary refill, no dopplerable signals. Right foot erythema with draining wound from between first and second toe amputation sites   Skin: Skin color, texture, turgor normal. No rashes or lesions. Data:  CBC:   Recent Labs     06/19/22  0801   WBC 9.4   HGB 12.9*        Chemistry:   Recent Labs     06/19/22  0801   *   K 4.5      CO2 23   GLUCOSE 89   BUN 6   CREATININE 0.54*   ANIONGAP 11   LABGLOM >60   GFRAA >60   CALCIUM 9.2     Hepatic: No results for input(s): AST, ALT, ALB, ALKPHOS, BILITOT, BILIDIR in the last 72 hours. Coagulation:   No results for input(s): APTT, PROT, INR in the last 72 hours. Radiology Review:    No results found.     ASSESSMENT:  Active Hospital Problems    Diagnosis Date Noted    PVD (peripheral vascular disease) ([facility] [I73.9] 06/16/2022     Priority: Medium    S/P arteriogram of extremity [Z98.890] 06/16/2022     Priority: Medium     1. 59 y.o. male with severe PAD s/p b/l LE angiogram 6/16    Plan:  1. Regular diet, NPO midnight on Wednesday  2. Keep RLE with dressing and ACE wrap to keep pressure off   1. Will change dressing Thursday  3. Stable wounds to R foot  1.  Vancomycin and zosyn started empirically will continue until post op  4. aortobifemoral bypass 6/23/22    Electronically signed by Osman Almanzar DO on 6/21/2022 at 6:20 AM

## 2022-06-21 NOTE — PROGRESS NOTES
Comprehensive Nutrition Assessment    Type and Reason for Visit:  RD Nutrition Re-Screen/LOS    Nutrition Recommendations/Plan:   1. Continue current diet, Regular  2. Request measured weight     Malnutrition Assessment:  Malnutrition Status:  Insufficient data (06/21/22 0944)    Context:  Acute Illness       Nutrition Assessment:    Chart review for LOS. 58 yo M adm w/ severe PAD, now s/p b/l LE angiogram (6/16). Noted plan for aortofemoral bypass on 6/23. Pt w/ 6.3% wt loss x 2 mos (not signidicant), using stated wt. Will request measured wt and monitor. Pt PO intake %. Nutrition Related Findings:    Labs/meds reviewed. Wound Type:  (R foot wound w/ gangrene)       Current Nutrition Intake & Therapies:    Average Meal Intake: %  Average Supplements Intake: None Ordered  ADULT DIET; Regular    Anthropometric Measures:  Height: 5' 8\" (172.7 cm)  Ideal Body Weight (IBW): 154 lbs (70 kg)       Current Body Weight: 163 lb (73.9 kg) (6/21, stated), 105.8 % IBW. Current BMI (kg/m2): 24.8  Usual Body Weight: 174 lb (78.9 kg) (4/8/22)  % Weight Change (Calculated): -6.3  Weight Adjustment For: No Adjustment                 BMI Categories: Normal Weight (BMI 18.5-24. 9)    Estimated Daily Nutrient Needs:  Energy Requirements Based On: Kcal/kg  Weight Used for Energy Requirements: Current  Energy (kcal/day): 1800 to 2200 kcal/day (25-30 kcal/kg)  Weight Used for Protein Requirements: Current  Protein (g/day): 70 to 90 g/day (1-1.2 g/kg)  Method Used for Fluid Requirements: Other (Comment)  Fluid (ml/day): per MD    Nutrition Diagnosis:   · Increased nutrient needs related to increase demand for energy/nutrients (healing) as evidenced by  (R foot wound)      Nutrition Interventions:   Food and/or Nutrient Delivery: Continue Current Diet  Nutrition Education/Counseling: No recommendation at this time  Coordination of Nutrition Care: Continue to monitor while inpatient       Goals:  Previous Goal Met:  (Goal set)  Goals: Meet at least 75% of estimated needs       Nutrition Monitoring and Evaluation:   Behavioral-Environmental Outcomes: None Identified  Food/Nutrient Intake Outcomes: Food and Nutrient Intake  Physical Signs/Symptoms Outcomes: Biochemical Data,Skin,Weight    Discharge Planning:    No discharge needs at this time     Shelley Foster MS, RD, LD  Contact: [phone]

## 2022-06-21 NOTE — H&P
Patient: Andre Oneal  : 1965  MRN: [id number]  DOS: 6/10/2022     Referring provider:  No ref. provider found            HPI:  Andre Oneal is a 59 y.o. male who comes to the office for the first time regarding bilateral lower extremity atherosclerotic disease with gangrenous changes on the right. He also has some cellulitis on the right. He was sent with a question of whether or not amputation is necessary. He does not complain so much of pain in his foot but he does explain that his swelling has worsened over the last 3 months with redness and gangrenous toe and is with ulceration on the first second and even third toe on the right side. The left side has had toe amputations at the transmetatarsal level of all 5 toes. He walks infrequently and uses a wheelchair for most of the time. He is not currently on antibiotics. He has calf pain when he walks at very short distances of approximately 25 to 50 feet. He denies chest pain and denies shortness of breath. He denies problems with stroke TIA or amaurosis fugax and has never had a coronary event. He does have high blood pressure and high cholesterol for which she is on amlodipine and metoprolol as well as a statin medication. He takes aspirin daily. He does not know of any family history or personal history of aneurysmal disease.   Past Medical History        Past Medical History:   Diagnosis Date    Alcohol abuse      CAD (coronary artery disease)      Convulsions ([facility] [address] [facility]      Frostbite      Hypertension           Family History         Family History   Problem Relation Age of Onset    Cancer Mother      Heart Disease Father           Social History               Socioeconomic History    Marital status:        Spouse name: Not on file    Number of children: Not on file    Years of education: Not on file    Highest education level: Not on file   Occupational History    Not on file   Tobacco Use    Smoking status: Current Every Day Smoker       Packs/day: 1.00       Types: Cigarettes    Smokeless tobacco: Never Used   Substance and Sexual Activity    Alcohol use: Yes       Alcohol/week: 6.0 standard drinks       Types: 6 Cans of beer per week       Comment: approx 12 pack daily    Drug use: No    Sexual activity: Not on file   Other Topics Concern    Not on file   Social History Narrative    Not on file      Social Determinants of Health      Financial Resource Strain:     Difficulty of Paying Living Expenses: Not on file   Food Insecurity:     Worried About Running Out of Food in the Last Year: Not on file    Rosy of Food in the Last Year: Not on file   Transportation Needs:     Lack of Transportation (Medical): Not on file    Lack of Transportation (Non-Medical): Not on file   Physical Activity:     Days of Exercise per Week: Not on file    Minutes of Exercise per Session: Not on file   Stress:     Feeling of Stress : Not on file   Social Connections:     Frequency of Communication with Friends and Family: Not on file    Frequency of Social Gatherings with Friends and Family: Not on file    Attends Faith Services: Not on file    Active Member of [address] [place] [address] or Organizations: Not on file    Attends Club or Organization Meetings: Not on file    Marital Status: Not on file   Intimate Partner Violence:     Fear of Current or Ex-Partner: Not on file    Emotionally Abused: Not on file    Physically Abused: Not on file    Sexually Abused: Not on file   Housing Stability:     Unable to Pay for Housing in the Last Year: Not on file    Number of [place] in the Last Year: Not on file    Unstable Housing in the Last Year: Not on file         Past Surgical History         Past Surgical History:   Procedure Laterality Date    TOE AMPUTATION         left foot         Review of Systems   Constitutional: Negative for activity change, fever and unexpected weight change.    HENT: Negative for trouble swallowing and voice change. Eyes: Negative for pain and visual disturbance. Respiratory: Negative for cough, chest tightness and shortness of breath. Cardiovascular: Negative for chest pain and palpitations. Gastrointestinal: Negative for abdominal distention, abdominal pain and vomiting. Endocrine: Negative for cold intolerance and heat intolerance. Genitourinary: Negative for dysuria, flank pain and hematuria. Musculoskeletal: Negative for joint swelling and neck pain. Skin: Negative for color change and rash. Allergic/Immunologic: Negative for immunocompromised state. Neurological: Negative for syncope, speech difficulty, weakness, numbness and headaches. Hematological: Negative for adenopathy. Psychiatric/Behavioral: Negative for behavioral problems and suicidal ideas. Vitals       Vitals:     06/10/22 0858   BP: 133/78   Site: Right Upper Arm   Position: Sitting   Cuff Size: Medium Adult   Pulse: 74   Resp: 20   Temp: 98.2 °F (36.8 °C)   TempSrc: Temporal   SpO2: 95%   Weight: 167 lb (75.8 kg)   Height: 5' 8\" (1.727 m)             Physical Exam  Constitutional:       General: He is not in acute distress. HENT:      Mouth/Throat:      Mouth: Mucous membranes are moist.      Pharynx: Oropharynx is clear. Eyes:      General: No scleral icterus. Extraocular Movements: Extraocular movements intact. Conjunctiva/sclera: Conjunctivae normal.   Neck:      Thyroid: No thyroid mass or thyromegaly. Cardiovascular:      Rate and Rhythm: Normal rate and regular rhythm. Heart sounds: No murmur heard. Comments: He has no carotid bruits. He has no cardiac murmur. His chest is clear to auscultation bilaterally. His abdomen is soft nontender nondistended. He has palpable femoral pulses bilaterally but no popliteal dorsalis pedis or posterior tibial pulses in either lower extremity. He has dependent rubor on the right with some edema and likely some cellulitis.   It is mostly in the forefoot. It does not go up into the hindfoot or in the calf. His left lower extremity is without any skin change. He has ulcerative gangrenous changes on the right lower extremity at the first second and third toes. Bone is exposed on the second toe. Pulmonary:      Effort: No respiratory distress. Breath sounds: No rales. Abdominal:      General: There is no distension. Palpations: There is no mass. Tenderness: There is no abdominal tenderness. There is no guarding. Musculoskeletal:      Cervical back: No rigidity or tenderness. Lymphadenopathy:      Cervical: No cervical adenopathy. Skin:     Coloration: Skin is not jaundiced. Findings: No rash. Neurological:      General: No focal deficit present. Mental Status: He is alert and oriented to person, place, and time. Cranial Nerves: No cranial nerve deficit. Psychiatric:         Mood and Affect: Mood normal.            Assessment:  1. 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 I explained that we could go ahead with arteriography and intervention and local wound care with potential toe amputations and be able to get him out of the hospital within a week. He wants to go home and do this as an outpatient. We gave him a prescription for doxycycline and I ordered to go ahead with arteriography of the right lower extremity to evaluate his arterial integrity to the level of the ankle and foot and also have the opportunity to treat during that evaluation. He understands that this procedure involves a groin access on the left with an up and over technique to study the arteries with contrast dye and eventually intervene if possible.   He understands that there are risks to these procedures including but not limited to bleeding, infection, hematoma, nerve damage, limb loss, the need for bypass, stroke, heart attack and death as well as renal insufficiency or failure. He agrees to proceed. We will be seeing him in the near future for arteriography of the right lower extremity. At that time I will again offer him the opportunity of admission if he needs a bypass but more so to remove the toes as he undoubtedly has osteomyelitis of the second toe as that is the toe where bone is exposed. Electronically signed by:  Boris Enriquez MD  This H&P is up-to-date and accurate. Are no changes to document.

## 2022-06-22 ENCOUNTER — ANESTHESIA EVENT (OUTPATIENT)
Dept: OPERATING ROOM | Age: 57
DRG: 253 | End: 2022-06-22
Payer: COMMERCIAL

## 2022-06-22 PROCEDURE — 2060000000 HC ICU INTERMEDIATE R&B

## 2022-06-22 PROCEDURE — 6370000000 HC RX 637 (ALT 250 FOR IP): Performed by: STUDENT IN AN ORGANIZED HEALTH CARE EDUCATION/TRAINING PROGRAM

## 2022-06-22 PROCEDURE — 2580000003 HC RX 258: Performed by: SURGERY

## 2022-06-22 PROCEDURE — 86850 RBC ANTIBODY SCREEN: CPT

## 2022-06-22 PROCEDURE — 86920 COMPATIBILITY TEST SPIN: CPT

## 2022-06-22 PROCEDURE — 36415 COLL VENOUS BLD VENIPUNCTURE: CPT

## 2022-06-22 PROCEDURE — 94761 N-INVAS EAR/PLS OXIMETRY MLT: CPT

## 2022-06-22 PROCEDURE — 6360000002 HC RX W HCPCS: Performed by: STUDENT IN AN ORGANIZED HEALTH CARE EDUCATION/TRAINING PROGRAM

## 2022-06-22 PROCEDURE — 2580000003 HC RX 258: Performed by: STUDENT IN AN ORGANIZED HEALTH CARE EDUCATION/TRAINING PROGRAM

## 2022-06-22 PROCEDURE — 86900 BLOOD TYPING SEROLOGIC ABO: CPT

## 2022-06-22 PROCEDURE — 86901 BLOOD TYPING SEROLOGIC RH(D): CPT

## 2022-06-22 RX ORDER — DEXTROSE, SODIUM CHLORIDE, AND POTASSIUM CHLORIDE 5; .45; .15 G/100ML; G/100ML; G/100ML
INJECTION INTRAVENOUS CONTINUOUS
Status: DISCONTINUED | OUTPATIENT
Start: 2022-06-22 | End: 2022-06-24

## 2022-06-22 RX ADMIN — Medication 1250 MG: at 10:08

## 2022-06-22 RX ADMIN — OXYCODONE 10 MG: 5 TABLET ORAL at 02:48

## 2022-06-22 RX ADMIN — Medication 81 MG: at 09:54

## 2022-06-22 RX ADMIN — ACETAMINOPHEN 1000 MG: 500 TABLET ORAL at 16:58

## 2022-06-22 RX ADMIN — SODIUM CHLORIDE: 9 INJECTION, SOLUTION INTRAVENOUS at 22:03

## 2022-06-22 RX ADMIN — SODIUM CHLORIDE, PRESERVATIVE FREE 10 ML: 5 INJECTION INTRAVENOUS at 21:58

## 2022-06-22 RX ADMIN — METOPROLOL TARTRATE 25 MG: 25 TABLET ORAL at 09:54

## 2022-06-22 RX ADMIN — PIPERACILLIN AND TAZOBACTAM 3375 MG: 3; .375 INJECTION, POWDER, FOR SOLUTION INTRAVENOUS at 10:35

## 2022-06-22 RX ADMIN — ACETAMINOPHEN 1000 MG: 500 TABLET ORAL at 09:53

## 2022-06-22 RX ADMIN — SODIUM CHLORIDE: 9 INJECTION, SOLUTION INTRAVENOUS at 02:43

## 2022-06-22 RX ADMIN — PIPERACILLIN AND TAZOBACTAM 3375 MG: 3; .375 INJECTION, POWDER, FOR SOLUTION INTRAVENOUS at 17:02

## 2022-06-22 RX ADMIN — SODIUM CHLORIDE, PRESERVATIVE FREE 5 ML: 5 INJECTION INTRAVENOUS at 10:56

## 2022-06-22 RX ADMIN — PIPERACILLIN AND TAZOBACTAM 3375 MG: 3; .375 INJECTION, POWDER, FOR SOLUTION INTRAVENOUS at 02:44

## 2022-06-22 RX ADMIN — SODIUM CHLORIDE, PRESERVATIVE FREE 5 ML: 5 INJECTION INTRAVENOUS at 10:57

## 2022-06-22 RX ADMIN — METOPROLOL TARTRATE 25 MG: 25 TABLET ORAL at 21:57

## 2022-06-22 RX ADMIN — OXYCODONE 10 MG: 5 TABLET ORAL at 11:47

## 2022-06-22 RX ADMIN — PRAVASTATIN SODIUM 40 MG: 20 TABLET ORAL at 21:57

## 2022-06-22 RX ADMIN — Medication 1250 MG: at 22:04

## 2022-06-22 RX ADMIN — ENOXAPARIN SODIUM 40 MG: 100 INJECTION SUBCUTANEOUS at 09:53

## 2022-06-22 ASSESSMENT — PAIN SCALES - GENERAL: PAINLEVEL_OUTOF10: 7

## 2022-06-22 NOTE — PLAN OF CARE
Problem: Discharge Planning  Goal: Discharge to home or other facility with appropriate resources  6/22/2022 1117 by Lito Mercedes RN  Outcome: Progressing  6/22/2022 0519 by Deloris Taylor RN  Outcome: Progressing     Problem: Safety - Adult  Goal: Free from fall injury  6/22/2022 1117 by Lito Mercedes RN  Outcome: Progressing  6/22/2022 0519 by Deloris Taylor RN  Outcome: Progressing     Problem: ABCDS Injury Assessment  Goal: Absence of physical injury  Outcome: Progressing     Problem: Pain  Goal: Verbalizes/displays adequate comfort level or baseline comfort level  6/22/2022 1117 by Lito Mercedes RN  Outcome: Progressing  6/22/2022 0519 by Deloris Taylor RN  Outcome: Progressing     Problem: Skin/Tissue Integrity  Goal: Absence of new skin breakdown  Description: 1. Monitor for areas of redness and/or skin breakdown  2. Assess vascular access sites hourly  3. Every 4-6 hours minimum:  Change oxygen saturation probe site  4. Every 4-6 hours:  If on nasal continuous positive airway pressure, respiratory therapy assess nares and determine need for appliance change or resting period.   Outcome: Progressing     Problem: Nutrition Deficit:  Goal: Optimize nutritional status  Outcome: Progressing

## 2022-06-22 NOTE — ANESTHESIA PRE PROCEDURE
Department of Anesthesiology  Preprocedure Note       Name:  Aydin Salter   Age:  59 y.o.  :  1965                                          MRN:  [id number]         Date:  2022      Surgeon: Nikita Cunha):  Maria Luisa Mai MD    Procedure: Procedure(s):  AORTO-BIFEMORAL BYPASS ** CELL SAVER**    Medications prior to admission:   Prior to Admission medications    Medication Sig Start Date End Date Taking? Authorizing Provider   doxycycline hyclate (VIBRA-TABS) 100 MG tablet Take 1 tablet by mouth 2 times daily for 14 days  Patient not taking: Reported on 2022 6/10/22 6/24/22  Maria Luisa Mai MD   ibuprofen (ADVIL;MOTRIN) 800 MG tablet Take 1 tablet by mouth every 8 hours as needed for Pain  Patient not taking: Reported on 2022 3/17/22   Tesha Lane MD   acetaminophen (TYLENOL) 500 MG tablet Take 2 tablets by mouth every 8 hours as needed for Pain 3/17/22   Tesha Lane MD   pravastatin (PRAVACHOL) 40 MG tablet Take 40 mg by mouth daily    Historical Provider, MD   loratadine (CLARITIN) 10 MG tablet Take 10 mg by mouth daily  Patient not taking: Reported on 2022    Historical Provider, MD   sulfamethoxazole-trimethoprim (BACTRIM DS;SEPTRA DS) 800-160 MG per tablet Take 1 tablet by mouth 2 times daily  Patient not taking: Reported on 2022    Historical Provider, MD   Misc. Devices MISC Rx: Please dispense one Cane. 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 Difficulty ambulating  Sig: Use cane as needed to assist in ambulating. Patient not taking: Reported on 2022 11/9/15   Thaddeus Severin, DPM   Misc. Devices MISC Please dispense a 30 day supply of the following:  4x4 gauze, Kerlix rolls, and paper tape. 11/9/15   Thaddeus Severin, DPM   polyethylene glycol (GLYCOLAX) packet Take 17 g by mouth daily as needed. Patient not taking: Reported on 2022    Historical Provider, MD   senna (SENOKOT) 8.6 MG tablet Take 1 tablet by mouth nightly.   Patient not taking: Reported on 6/16/2022    Historical Provider, MD   silver sulfADIAZINE (SILVADENE) 1 % cream Apply topically daily. Patient not taking: Reported on 6/16/2022 2/26/13   Julianne García MD   Gauze Pads & Dressings ([place]) [address] [facility] by Does not apply route. Patient not taking: Reported on 6/16/2022 2/26/13   Julianne García MD   doxycycline (VIBRAMYCIN) 100 MG capsule Take 100 mg by mouth 2 times daily. Historical Provider, MD   metoprolol (LOPRESSOR) 25 MG tablet Take 25 mg by mouth 2 times daily. Historical Provider, MD   clopidogrel (PLAVIX) 75 MG tablet Take 75 mg by mouth daily. Historical Provider, MD   aspirin 81 MG tablet Take 81 mg by mouth daily. Historical Provider, MD   oxyCODONE-acetaminophen (PERCOCET) 5-325 MG per tablet Take 1 tablet by mouth. Historical Provider, MD   folic acid (FOLVITE) 1 MG tablet Take 1 mg by mouth daily. Historical Provider, MD   multivitamin [facility]) per tablet Take 1 tablet by mouth daily. Patient not taking: Reported on 6/16/2022    Historical Provider, MD   simvastatin (ZOCOR) 10 MG tablet Take 10 mg by mouth nightly. Historical Provider, MD       Current medications:    No current facility-administered medications for this visit. No current outpatient medications on file.      Facility-Administered Medications Ordered in Other Visits   Medication Dose Route Frequency Provider Last Rate Last Admin    oxyCODONE (ROXICODONE) immediate release tablet 10 mg  10 mg Oral Q8H PRN Marguerita Benne, DO   10 mg at 06/22/22 1147    Or    oxyCODONE (ROXICODONE) immediate release tablet 5 mg  5 mg Oral Q8H PRN Marguerita Benne, DO        sodium chloride flush 0.9 % injection 10 mL  10 mL IntraVENous PRN Marguerita Benne, DO   10 mL at 06/20/22 1018    sodium chloride flush 0.9 % injection 10 mL  10 mL IntraVENous PRN Marguerita Benne, DO   10 mL at 06/20/22 0825    vancomycin (VANCOCIN) intermittent dosing (placeholder)   Other RX Placeholder Flor Evans, DO        vancomycin (VANCOCIN) 1250 mg in sodium chloride 0.9% 250 mL IVPB  1,250 mg IntraVENous Q12H Flor Cabell, DO   Stopped at 06/22/22 1157    metoprolol tartrate (LOPRESSOR) tablet 25 mg  25 mg Oral BID Flor Cabell, DO   25 mg at 06/22/22 0954    piperacillin-tazobactam (ZOSYN) 3,375 mg in dextrose 5 % 50 mL IVPB (mini-bag)  3,375 mg IntraVENous Q8H Flor Cabell, DO 12.5 mL/hr at 06/22/22 1035 3,375 mg at 06/22/22 1035    hydrALAZINE (APRESOLINE) injection 10 mg  10 mg IntraVENous Q4H PRN Nanette Espino MD   10 mg at 06/21/22 0450    acetaminophen (TYLENOL) tablet 1,000 mg  1,000 mg Oral q8h Flor Evans, DO   1,000 mg at 06/22/22 0953    ibuprofen (ADVIL;MOTRIN) tablet 400 mg  400 mg Oral Q6H PRN Flor Cabell, DO        sodium chloride flush 0.9 % injection 5-40 mL  5-40 mL IntraVENous 2 times per day Kenia Obando MD   5 mL at 06/22/22 1056    sodium chloride flush 0.9 % injection 5-40 mL  5-40 mL IntraVENous PRN Kenia Obando MD        0.9 % sodium chloride infusion   IntraVENous PRN Kenia Obando MD 10 mL/hr at 06/19/22 2154 New Bag at 06/19/22 2154    sodium chloride flush 0.9 % injection 5-40 mL  5-40 mL IntraVENous 2 times per day Flor Cabell, DO   5 mL at 06/22/22 1057    sodium chloride flush 0.9 % injection 5-40 mL  5-40 mL IntraVENous PRN Flor Cabell, DO   10 mL at 06/17/22 0759    0.9 % sodium chloride infusion   IntraVENous PRN Flor Evans, DO 12.5 mL/hr at 06/22/22 0243 New Bag at 06/22/22 0243    ondansetron (ZOFRAN-ODT) disintegrating tablet 4 mg  4 mg Oral Q8H PRN Flor Evans, DO        Or    ondansetron (ZOFRAN) injection 4 mg  4 mg IntraVENous Q6H PRN Flor Evans, DO        enoxaparin (LOVENOX) injection 40 mg  40 mg SubCUTAneous Daily Flor Evans, DO   40 mg at 06/22/22 0953    aspirin EC tablet 81 mg  81 mg Oral Daily Flor Cabell, DO   81 mg at 06/22/22 0954    pravastatin (PRAVACHOL) tablet 40 mg  40 mg Oral Daily Flor Evans, DO   40 mg at 06/21/22 1957    [Held by provider] clopidogrel (PLAVIX) tablet 75 mg  75 mg Oral Daily Flor Higbee, DO   75 mg at 06/17/22 0758       Allergies: Allergies   Allergen Reactions    Shellfish-Derived Products Diarrhea       Problem List:    Patient Active Problem List   Diagnosis Code    Frostbite of foot T33.829A    Frostbite with tissue necrosis T34.90XA    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, right leg ([facility] [address] I70.261    [facility] I73.9    S/P arteriogram of extremity Z98.890       Past Medical History:        Diagnosis Date    Alcohol abuse     CAD (coronary artery disease)     [facility]     Frostbite     Hypertension        Past Surgical History:        Procedure Laterality Date    TOE AMPUTATION      left foot       Social History:    Social History     Tobacco Use    Smoking status: Current Every Day Smoker     Packs/day: 1.00     Types: Cigarettes    Smokeless tobacco: Never Used   Substance Use Topics    Alcohol use: Yes     Alcohol/week: 12.0 standard drinks     Types: 12 Cans of beer per week     Comment: approx 12 pack daily                                Ready to quit: Not Answered  Counseling given: Not Answered      Vital Signs (Current): There were no vitals filed for this visit.                                            BP Readings from Last 3 Encounters:   06/22/22 (!) 156/65   06/10/22 133/78   04/08/22 (!) 168/86       NPO Status:                                                                                 BMI:   Wt Readings from Last 3 Encounters:   06/21/22 163 lb (73.9 kg)   06/10/22 167 lb (75.8 kg)   04/08/22 174 lb (78.9 kg)     There is no height or weight on file to calculate BMI.    CBC:   Lab Results   Component Value Date    WBC 9.4 06/19/2022    RBC 4.28 06/19/2022    RBC 4.46 05/28/2012    HGB 12.9 06/19/2022    HCT 41.9 06/19/2022 MCV 97.9 06/19/2022    RDW 14.6 06/19/2022     06/19/2022     05/28/2012       CMP:   Lab Results   Component Value Date     06/19/2022    K 4.5 06/19/2022     06/19/2022    CO2 23 06/19/2022    BUN 6 06/19/2022    CREATININE 0.54 06/19/2022    GFRAA >60 06/19/2022    LABGLOM >60 06/19/2022    GLUCOSE 89 06/19/2022    GLUCOSE 85 05/28/2012    PROT 7.1 05/30/2013    CALCIUM 9.2 06/19/2022    BILITOT 0.18 05/27/2012    ALKPHOS 130 05/27/2012    AST 42 05/27/2012    ALT 24 05/27/2012       POC Tests: No results for input(s): POCGLU, POCNA, POCK, POCCL, POCBUN, POCHEMO, POCHCT in the last 72 hours. Coags:   Lab Results   Component Value Date    PROTIME 13.4 06/16/2022    INR 1.1 06/16/2022    INR 1.1 09/20/2012    APTT 31.7 05/28/2012       HCG (If Applicable): No results found for: PREGTESTUR, PREGSERUM, HCG, HCGQUANT     ABGs: No results found for: PHART, PO2ART, GLI2CTI, AKX4QLC, BEART, H6VMCJBW     Type & Screen (If Applicable):  No results found for: LABABO, LABRH    Drug/Infectious Status (If Applicable):  No results found for: HIV, HEPCAB    COVID-19 Screening (If Applicable): No results found for: COVID19      Stress test  6/20/2022  Impression   1. No definitive scintigraphic evidence for reversible ischemia or infarct. 2. Left ventricular ejection fraction of 66%. 3.  Please see report for EKG portion of the examination which will be   performed separately by physician from cardiology. Risk stratification:  Low risk     Electrocardiographically Negative Lexiscan stress study. TTE 6/20/2022  Left ventricle is normal in size with normal systolic function globally. Calculated ejection fraction by bi-plane Royal's method is 62%. Aortic sclerosis without stenosis. Mild mitral regurgitation. Mild tricuspid regurgitation. Estimated right ventricular systolic pressure is 25 mmHg.         Anesthesia Evaluation  Patient summary reviewed no history of anesthetic complications:   Airway: Mallampati: II  TM distance: >3 FB   Neck ROM: full  Mouth opening: > = 3 FB   Dental:    (+) other  Comment: Several missing teeth,  Remainder are in poor repair    Pulmonary:Negative Pulmonary ROS and normal exam  breath sounds clear to auscultation                             Cardiovascular:    (+) hypertension: no interval change, CAD: no interval change,       ECG reviewed  Rhythm: regular  Rate: normal  Echocardiogram reviewed                  Neuro/Psych:   (+) psychiatric history:depression/anxiety             GI/Hepatic/Renal: Neg GI/Hepatic/Renal ROS            Endo/Other: Negative Endo/Other ROS                    Abdominal:             Vascular:   + PVD, aortic or cerebral, . Other Findings:             Anesthesia Plan      general     ASA 4       Induction: intravenous. arterial line and central line  MIPS: Postoperative opioids intended, Prophylactic antiemetics administered and Postoperative trial extubation. Anesthetic plan and risks discussed with patient. Use of blood products discussed with patient whom consented to blood products. Plan discussed with CRNA.             Keila Chery MD   6/22/2022

## 2022-06-22 NOTE — PROGRESS NOTES
Vascular Surgery   Progress Note    PATIENT NAME: Jennifer Trotter     TODAY'S DATE: 6/22/2022     SUBJECTIVE:     Pt seen and examined at bedside. VSS, afebrile, complains of throbbing pain in R foot that has improved, patient denies CP, SOB, N/V. Patient states he feels well, long discussion about his operation tomorrow and expected outcomes, questions answered, consent obtained. OBJECTIVE:   VITALS:  BP (!) 146/64   Pulse 58   Temp 97.9 °F (36.6 °C) (Oral)   Resp 15   Ht 5' 8\" (1.727 m)   Wt 163 lb (73.9 kg)   SpO2 95%   BMI 24.78 kg/m²      INTAKE/OUTPUT:      Intake/Output Summary (Last 24 hours) at 6/22/2022 0305  Last data filed at 6/21/2022 1408  Gross per 24 hour   Intake --   Output 350 ml   Net -350 ml     PHYSICAL EXAM:  General Appearance: awake, alert, oriented, in no acute distress  HEENT:  Normocephalic, atraumatic, mucus membranes moist   Heart: Sinu bradycardia  Lungs: normal effort with symmetric rise and fall of chest wall  Abdomen: soft, nondistended, nontender to palpation  Extremities: RLE - toe amputations, delayed capillary refill, monophasic PT signals, LLE - toe amputations, delayed capillary refill, no dopplerable signals. Right foot erythema with draining wound from between first and second toe amputation sites   Skin: Skin color, texture, turgor normal. No rashes or lesions. Data:  CBC:   Recent Labs     06/19/22  0801   WBC 9.4   HGB 12.9*        Chemistry:   Recent Labs     06/19/22  0801   *   K 4.5      CO2 23   GLUCOSE 89   BUN 6   CREATININE 0.54*   ANIONGAP 11   LABGLOM >60   GFRAA >60   CALCIUM 9.2     Hepatic: No results for input(s): AST, ALT, ALB, ALKPHOS, BILITOT, BILIDIR in the last 72 hours. Coagulation:   No results for input(s): APTT, PROT, INR in the last 72 hours. Radiology Review:    No results found.     ASSESSMENT:  Active Hospital Problems    Diagnosis Date Noted    [facility] [I73.9] 06/16/2022 Priority: Medium    S/P arteriogram of extremity [Z98.890] 06/16/2022     Priority: Medium     1. 59 y.o. male with severe PAD s/p b/l LE angiogram 6/16    Plan:  1. Regular diet, NPO midnight tonight  1. Meds ok, ice chips ok  2. Keep RLE with dressing and ACE wrap to keep pressure off   1. Dressing to be changed today per RN, hibiclens wash of foot and gentle kerlix/ace wrap  3. Stable wounds to R foot  1.  Vancomycin and zosyn started empirically will continue until post op  4. aortobifemoral bypass 6/23/22    Electronically signed by Ailin Easton DO on 6/22/2022 at 6:55 AM

## 2022-06-23 ENCOUNTER — ANESTHESIA (OUTPATIENT)
Dept: OPERATING ROOM | Age: 57
DRG: 253 | End: 2022-06-23
Payer: COMMERCIAL

## 2022-06-23 LAB
ABSOLUTE EOS #: <0.03 K/UL (ref 0–0.44)
ABSOLUTE IMMATURE GRANULOCYTE: 0.09 K/UL (ref 0–0.3)
ABSOLUTE LYMPH #: 1.18 K/UL (ref 1.1–3.7)
ABSOLUTE MONO #: 0.31 K/UL (ref 0.1–1.2)
ANION GAP SERPL CALCULATED.3IONS-SCNC: 12 MMOL/L (ref 9–17)
BASOPHILS # BLD: 0 % (ref 0–2)
BASOPHILS ABSOLUTE: 0.05 K/UL (ref 0–0.2)
BUN BLDV-MCNC: 5 MG/DL (ref 6–20)
CALCIUM IONIZED: 1.16 MMOL/L (ref 1.13–1.33)
CALCIUM SERPL-MCNC: 8.3 MG/DL (ref 8.6–10.4)
CHLORIDE BLD-SCNC: 104 MMOL/L (ref 98–107)
CO2: 21 MMOL/L (ref 20–31)
CREAT SERPL-MCNC: 0.5 MG/DL (ref 0.7–1.2)
EOSINOPHILS RELATIVE PERCENT: 0 % (ref 1–4)
GFR AFRICAN AMERICAN: >60 ML/MIN
GFR NON-AFRICAN AMERICAN: >60 ML/MIN
GFR SERPL CREATININE-BSD FRML MDRD: ABNORMAL ML/MIN/{1.73_M2}
GLUCOSE BLD-MCNC: 154 MG/DL (ref 70–99)
HCT VFR BLD CALC: 36.7 % (ref 40.7–50.3)
HCT VFR BLD CALC: 39.4 % (ref 40.7–50.3)
HEMOGLOBIN: 11.7 G/DL (ref 13–17)
HEMOGLOBIN: 12.6 G/DL (ref 13–17)
IMMATURE GRANULOCYTES: 1 %
INR BLD: 1
LYMPHOCYTES # BLD: 8 % (ref 24–43)
MAGNESIUM: 1.5 MG/DL (ref 1.6–2.6)
MCH RBC QN AUTO: 30.4 PG (ref 25.2–33.5)
MCHC RBC AUTO-ENTMCNC: 32 G/DL (ref 28.4–34.8)
MCV RBC AUTO: 94.9 FL (ref 82.6–102.9)
MONOCYTES # BLD: 2 % (ref 3–12)
NRBC AUTOMATED: 0 PER 100 WBC
PARTIAL THROMBOPLASTIN TIME: 25.8 SEC (ref 20.5–30.5)
PDW BLD-RTO: 14.2 % (ref 11.8–14.4)
PHOSPHORUS: 3 MG/DL (ref 2.5–4.5)
PLATELET # BLD: 369 K/UL (ref 138–453)
PMV BLD AUTO: 8.9 FL (ref 8.1–13.5)
POTASSIUM SERPL-SCNC: 4.2 MMOL/L (ref 3.7–5.3)
PROTHROMBIN TIME: 10.5 SEC (ref 9.1–12.3)
RBC # BLD: 4.15 M/UL (ref 4.21–5.77)
SEG NEUTROPHILS: 89 % (ref 36–65)
SEGMENTED NEUTROPHILS ABSOLUTE COUNT: 12.92 K/UL (ref 1.5–8.1)
SODIUM BLD-SCNC: 137 MMOL/L (ref 135–144)
WBC # BLD: 14.6 K/UL (ref 3.5–11.3)

## 2022-06-23 PROCEDURE — 6360000002 HC RX W HCPCS

## 2022-06-23 PROCEDURE — 3600000005 HC SURGERY LEVEL 5 BASE: Performed by: SURGERY

## 2022-06-23 PROCEDURE — 6370000000 HC RX 637 (ALT 250 FOR IP): Performed by: SURGERY

## 2022-06-23 PROCEDURE — 2709999900 HC NON-CHARGEABLE SUPPLY: Performed by: SURGERY

## 2022-06-23 PROCEDURE — 2500000003 HC RX 250 WO HCPCS: Performed by: SURGERY

## 2022-06-23 PROCEDURE — 6360000002 HC RX W HCPCS: Performed by: STUDENT IN AN ORGANIZED HEALTH CARE EDUCATION/TRAINING PROGRAM

## 2022-06-23 PROCEDURE — 2580000003 HC RX 258: Performed by: STUDENT IN AN ORGANIZED HEALTH CARE EDUCATION/TRAINING PROGRAM

## 2022-06-23 PROCEDURE — 04CL0ZZ EXTIRPATION OF MATTER FROM LEFT FEMORAL ARTERY, OPEN APPROACH: ICD-10-PCS | Performed by: SURGERY

## 2022-06-23 PROCEDURE — 84100 ASSAY OF PHOSPHORUS: CPT

## 2022-06-23 PROCEDURE — 7100000001 HC PACU RECOVERY - ADDTL 15 MIN

## 2022-06-23 PROCEDURE — 04UK0KZ SUPPLEMENT RIGHT FEMORAL ARTERY WITH NONAUTOLOGOUS TISSUE SUBSTITUTE, OPEN APPROACH: ICD-10-PCS | Performed by: SURGERY

## 2022-06-23 PROCEDURE — C1768 GRAFT, VASCULAR: HCPCS | Performed by: SURGERY

## 2022-06-23 PROCEDURE — 6370000000 HC RX 637 (ALT 250 FOR IP): Performed by: STUDENT IN AN ORGANIZED HEALTH CARE EDUCATION/TRAINING PROGRAM

## 2022-06-23 PROCEDURE — 83735 ASSAY OF MAGNESIUM: CPT

## 2022-06-23 PROCEDURE — A4217 STERILE WATER/SALINE, 500 ML: HCPCS | Performed by: SURGERY

## 2022-06-23 PROCEDURE — 2580000003 HC RX 258: Performed by: ANESTHESIOLOGY

## 2022-06-23 PROCEDURE — 85014 HEMATOCRIT: CPT

## 2022-06-23 PROCEDURE — 49000 EXPLORATION OF ABDOMEN: CPT | Performed by: SURGERY

## 2022-06-23 PROCEDURE — 85018 HEMOGLOBIN: CPT

## 2022-06-23 PROCEDURE — 85730 THROMBOPLASTIN TIME PARTIAL: CPT

## 2022-06-23 PROCEDURE — 35661 BPG FEMORAL-FEMORAL: CPT | Performed by: SURGERY

## 2022-06-23 PROCEDURE — 2000000000 HC ICU R&B

## 2022-06-23 PROCEDURE — 82330 ASSAY OF CALCIUM: CPT

## 2022-06-23 PROCEDURE — 2580000003 HC RX 258

## 2022-06-23 PROCEDURE — 6360000002 HC RX W HCPCS: Performed by: ANESTHESIOLOGY

## 2022-06-23 PROCEDURE — 85610 PROTHROMBIN TIME: CPT

## 2022-06-23 PROCEDURE — 04CK0ZZ EXTIRPATION OF MATTER FROM RIGHT FEMORAL ARTERY, OPEN APPROACH: ICD-10-PCS | Performed by: SURGERY

## 2022-06-23 PROCEDURE — 80048 BASIC METABOLIC PNL TOTAL CA: CPT

## 2022-06-23 PROCEDURE — 36415 COLL VENOUS BLD VENIPUNCTURE: CPT

## 2022-06-23 PROCEDURE — 3600000015 HC SURGERY LEVEL 5 ADDTL 15MIN: Performed by: SURGERY

## 2022-06-23 PROCEDURE — 6360000002 HC RX W HCPCS: Performed by: SURGERY

## 2022-06-23 PROCEDURE — 3700000000 HC ANESTHESIA ATTENDED CARE: Performed by: SURGERY

## 2022-06-23 PROCEDURE — 2580000003 HC RX 258: Performed by: SURGERY

## 2022-06-23 PROCEDURE — 2500000003 HC RX 250 WO HCPCS: Performed by: STUDENT IN AN ORGANIZED HEALTH CARE EDUCATION/TRAINING PROGRAM

## 2022-06-23 PROCEDURE — 04UL0KZ SUPPLEMENT LEFT FEMORAL ARTERY WITH NONAUTOLOGOUS TISSUE SUBSTITUTE, OPEN APPROACH: ICD-10-PCS | Performed by: SURGERY

## 2022-06-23 PROCEDURE — 7100000000 HC PACU RECOVERY - FIRST 15 MIN

## 2022-06-23 PROCEDURE — C1757 CATH, THROMBECTOMY/EMBOLECT: HCPCS | Performed by: SURGERY

## 2022-06-23 PROCEDURE — 2500000003 HC RX 250 WO HCPCS

## 2022-06-23 PROCEDURE — 3700000001 HC ADD 15 MINUTES (ANESTHESIA): Performed by: SURGERY

## 2022-06-23 PROCEDURE — B41G1ZZ FLUOROSCOPY OF LEFT LOWER EXTREMITY ARTERIES USING LOW OSMOLAR CONTRAST: ICD-10-PCS | Performed by: SURGERY

## 2022-06-23 PROCEDURE — 4A02XM4 MEASUREMENT OF CARDIAC TOTAL ACTIVITY, EXTERNAL APPROACH: ICD-10-PCS | Performed by: ANESTHESIOLOGY

## 2022-06-23 PROCEDURE — 35621 BPG AXILLARY-FEMORAL: CPT | Performed by: SURGERY

## 2022-06-23 PROCEDURE — 85025 COMPLETE CBC W/AUTO DIFF WBC: CPT

## 2022-06-23 DEVICE — PATCH VASC W0.8XL8CM PERIPH BOV PERICARD N PVC N DEHP CRSS: Type: IMPLANTABLE DEVICE | Site: GROIN | Status: FUNCTIONAL

## 2022-06-23 DEVICE — GRAFT VASC L80CM DIA8MM PTFE CBAS HEP SURF THN WALLED REM: Type: IMPLANTABLE DEVICE | Site: AXILLARY | Status: FUNCTIONAL

## 2022-06-23 RX ORDER — FENTANYL CITRATE 50 UG/ML
50 INJECTION, SOLUTION INTRAMUSCULAR; INTRAVENOUS
Status: DISCONTINUED | OUTPATIENT
Start: 2022-06-23 | End: 2022-06-27

## 2022-06-23 RX ORDER — FENTANYL CITRATE 50 UG/ML
INJECTION, SOLUTION INTRAMUSCULAR; INTRAVENOUS PRN
Status: DISCONTINUED | OUTPATIENT
Start: 2022-06-23 | End: 2022-06-23 | Stop reason: SDUPTHER

## 2022-06-23 RX ORDER — HEPARIN SODIUM 1000 [USP'U]/ML
INJECTION, SOLUTION INTRAVENOUS; SUBCUTANEOUS PRN
Status: DISCONTINUED | OUTPATIENT
Start: 2022-06-23 | End: 2022-06-23 | Stop reason: ALTCHOICE

## 2022-06-23 RX ORDER — PROPOFOL 10 MG/ML
INJECTION, EMULSION INTRAVENOUS PRN
Status: DISCONTINUED | OUTPATIENT
Start: 2022-06-23 | End: 2022-06-23 | Stop reason: SDUPTHER

## 2022-06-23 RX ORDER — NICARDIPINE HYDROCHLORIDE 0.1 MG/ML
2.5-15 INJECTION INTRAVENOUS CONTINUOUS
Status: DISCONTINUED | OUTPATIENT
Start: 2022-06-23 | End: 2022-06-24

## 2022-06-23 RX ORDER — SODIUM CHLORIDE 0.9 % (FLUSH) 0.9 %
5-40 SYRINGE (ML) INJECTION PRN
Status: DISCONTINUED | OUTPATIENT
Start: 2022-06-23 | End: 2022-06-28 | Stop reason: HOSPADM

## 2022-06-23 RX ORDER — PHENYLEPHRINE HCL IN 0.9% NACL 1 MG/10 ML
SYRINGE (ML) INTRAVENOUS PRN
Status: DISCONTINUED | OUTPATIENT
Start: 2022-06-23 | End: 2022-06-23 | Stop reason: SDUPTHER

## 2022-06-23 RX ORDER — HEPARIN SODIUM 1000 [USP'U]/ML
INJECTION, SOLUTION INTRAVENOUS; SUBCUTANEOUS PRN
Status: DISCONTINUED | OUTPATIENT
Start: 2022-06-23 | End: 2022-06-23 | Stop reason: SDUPTHER

## 2022-06-23 RX ORDER — RIFAMPIN 600 MG/10ML
INJECTION, POWDER, LYOPHILIZED, FOR SOLUTION INTRAVENOUS
Status: DISPENSED
Start: 2022-06-23 | End: 2022-06-23

## 2022-06-23 RX ORDER — NEOSTIGMINE METHYLSULFATE 5 MG/5 ML
SYRINGE (ML) INTRAVENOUS PRN
Status: DISCONTINUED | OUTPATIENT
Start: 2022-06-23 | End: 2022-06-23 | Stop reason: SDUPTHER

## 2022-06-23 RX ORDER — HEPARIN SODIUM 1000 [USP'U]/ML
INJECTION, SOLUTION INTRAVENOUS; SUBCUTANEOUS
Status: COMPLETED
Start: 2022-06-23 | End: 2022-06-23

## 2022-06-23 RX ORDER — ROCURONIUM BROMIDE 10 MG/ML
INJECTION, SOLUTION INTRAVENOUS PRN
Status: DISCONTINUED | OUTPATIENT
Start: 2022-06-23 | End: 2022-06-23 | Stop reason: SDUPTHER

## 2022-06-23 RX ORDER — LABETALOL HYDROCHLORIDE 5 MG/ML
10 INJECTION, SOLUTION INTRAVENOUS
Status: DISCONTINUED | OUTPATIENT
Start: 2022-06-23 | End: 2022-06-27

## 2022-06-23 RX ORDER — LABETALOL HYDROCHLORIDE 5 MG/ML
INJECTION, SOLUTION INTRAVENOUS PRN
Status: DISCONTINUED | OUTPATIENT
Start: 2022-06-23 | End: 2022-06-23 | Stop reason: SDUPTHER

## 2022-06-23 RX ORDER — MAGNESIUM HYDROXIDE 1200 MG/15ML
LIQUID ORAL CONTINUOUS PRN
Status: DISCONTINUED | OUTPATIENT
Start: 2022-06-23 | End: 2022-06-23 | Stop reason: ALTCHOICE

## 2022-06-23 RX ORDER — ACETAMINOPHEN 500 MG
1000 TABLET ORAL EVERY 8 HOURS SCHEDULED
Status: DISCONTINUED | OUTPATIENT
Start: 2022-06-23 | End: 2022-06-28 | Stop reason: HOSPADM

## 2022-06-23 RX ORDER — LIDOCAINE HYDROCHLORIDE 10 MG/ML
INJECTION, SOLUTION EPIDURAL; INFILTRATION; INTRACAUDAL; PERINEURAL PRN
Status: DISCONTINUED | OUTPATIENT
Start: 2022-06-23 | End: 2022-06-23 | Stop reason: SDUPTHER

## 2022-06-23 RX ORDER — ONDANSETRON 2 MG/ML
INJECTION INTRAMUSCULAR; INTRAVENOUS PRN
Status: DISCONTINUED | OUTPATIENT
Start: 2022-06-23 | End: 2022-06-23 | Stop reason: SDUPTHER

## 2022-06-23 RX ORDER — GLYCOPYRROLATE 0.2 MG/ML
INJECTION INTRAMUSCULAR; INTRAVENOUS PRN
Status: DISCONTINUED | OUTPATIENT
Start: 2022-06-23 | End: 2022-06-23 | Stop reason: SDUPTHER

## 2022-06-23 RX ORDER — PROTAMINE SULFATE 10 MG/ML
INJECTION, SOLUTION INTRAVENOUS PRN
Status: DISCONTINUED | OUTPATIENT
Start: 2022-06-23 | End: 2022-06-23 | Stop reason: SDUPTHER

## 2022-06-23 RX ORDER — SODIUM CHLORIDE, SODIUM LACTATE, POTASSIUM CHLORIDE, CALCIUM CHLORIDE 600; 310; 30; 20 MG/100ML; MG/100ML; MG/100ML; MG/100ML
INJECTION, SOLUTION INTRAVENOUS CONTINUOUS PRN
Status: DISCONTINUED | OUTPATIENT
Start: 2022-06-23 | End: 2022-06-23 | Stop reason: SDUPTHER

## 2022-06-23 RX ORDER — SODIUM CHLORIDE 9 MG/ML
INJECTION, SOLUTION INTRAVENOUS PRN
Status: DISCONTINUED | OUTPATIENT
Start: 2022-06-23 | End: 2022-06-28 | Stop reason: HOSPADM

## 2022-06-23 RX ORDER — METHOCARBAMOL 750 MG/1
750 TABLET, FILM COATED ORAL EVERY 6 HOURS
Status: DISCONTINUED | OUTPATIENT
Start: 2022-06-23 | End: 2022-06-28 | Stop reason: HOSPADM

## 2022-06-23 RX ORDER — SODIUM CHLORIDE 0.9 % (FLUSH) 0.9 %
5-40 SYRINGE (ML) INJECTION EVERY 12 HOURS SCHEDULED
Status: DISCONTINUED | OUTPATIENT
Start: 2022-06-23 | End: 2022-06-28 | Stop reason: HOSPADM

## 2022-06-23 RX ORDER — GABAPENTIN 300 MG/1
300 CAPSULE ORAL EVERY 8 HOURS SCHEDULED
Status: DISCONTINUED | OUTPATIENT
Start: 2022-06-23 | End: 2022-06-28 | Stop reason: HOSPADM

## 2022-06-23 RX ORDER — SODIUM CHLORIDE 9 MG/ML
INJECTION, SOLUTION INTRAVENOUS PRN
Status: COMPLETED | OUTPATIENT
Start: 2022-06-23 | End: 2022-06-23

## 2022-06-23 RX ORDER — DEXAMETHASONE SODIUM PHOSPHATE 10 MG/ML
INJECTION INTRAMUSCULAR; INTRAVENOUS PRN
Status: DISCONTINUED | OUTPATIENT
Start: 2022-06-23 | End: 2022-06-23 | Stop reason: SDUPTHER

## 2022-06-23 RX ORDER — IBUPROFEN 400 MG/1
400 TABLET ORAL EVERY 6 HOURS
Status: DISCONTINUED | OUTPATIENT
Start: 2022-06-23 | End: 2022-06-26

## 2022-06-23 RX ORDER — ONDANSETRON 2 MG/ML
4 INJECTION INTRAMUSCULAR; INTRAVENOUS
Status: DISCONTINUED | OUTPATIENT
Start: 2022-06-23 | End: 2022-06-23

## 2022-06-23 RX ADMIN — PROPOFOL 20 MG: 10 INJECTION, EMULSION INTRAVENOUS at 09:31

## 2022-06-23 RX ADMIN — FENTANYL CITRATE 50 MCG: 50 INJECTION, SOLUTION INTRAMUSCULAR; INTRAVENOUS at 12:58

## 2022-06-23 RX ADMIN — SODIUM CHLORIDE, POTASSIUM CHLORIDE, SODIUM LACTATE AND CALCIUM CHLORIDE: 600; 310; 30; 20 INJECTION, SOLUTION INTRAVENOUS at 07:44

## 2022-06-23 RX ADMIN — PROTAMINE SULFATE 20 MG: 10 INJECTION, SOLUTION INTRAVENOUS at 14:01

## 2022-06-23 RX ADMIN — PROPOFOL 50 MG: 10 INJECTION, EMULSION INTRAVENOUS at 14:55

## 2022-06-23 RX ADMIN — FENTANYL CITRATE 50 MCG: 50 INJECTION, SOLUTION INTRAMUSCULAR; INTRAVENOUS at 16:27

## 2022-06-23 RX ADMIN — Medication 3 MG: at 14:51

## 2022-06-23 RX ADMIN — LIDOCAINE HYDROCHLORIDE 50 MG: 10 INJECTION, SOLUTION EPIDURAL; INFILTRATION; INTRACAUDAL; PERINEURAL at 07:44

## 2022-06-23 RX ADMIN — ACETAMINOPHEN 1000 MG: 500 TABLET ORAL at 15:54

## 2022-06-23 RX ADMIN — SODIUM CHLORIDE, PRESERVATIVE FREE 10 ML: 5 INJECTION INTRAVENOUS at 19:31

## 2022-06-23 RX ADMIN — ACETAMINOPHEN 1000 MG: 500 TABLET ORAL at 21:51

## 2022-06-23 RX ADMIN — HEPARIN SODIUM 3000 UNITS: 1000 INJECTION INTRAVENOUS; SUBCUTANEOUS at 12:04

## 2022-06-23 RX ADMIN — GABAPENTIN 300 MG: 300 CAPSULE ORAL at 21:51

## 2022-06-23 RX ADMIN — HYDROMORPHONE HYDROCHLORIDE 0.5 MG: 1 INJECTION, SOLUTION INTRAMUSCULAR; INTRAVENOUS; SUBCUTANEOUS at 15:39

## 2022-06-23 RX ADMIN — ROCURONIUM BROMIDE 50 MG: 10 INJECTION INTRAVENOUS at 08:25

## 2022-06-23 RX ADMIN — FENTANYL CITRATE 50 MCG: 50 INJECTION, SOLUTION INTRAMUSCULAR; INTRAVENOUS at 09:36

## 2022-06-23 RX ADMIN — PROPOFOL 30 MG: 10 INJECTION, EMULSION INTRAVENOUS at 12:54

## 2022-06-23 RX ADMIN — ROCURONIUM BROMIDE 10 MG: 10 INJECTION INTRAVENOUS at 13:36

## 2022-06-23 RX ADMIN — POTASSIUM CHLORIDE, DEXTROSE MONOHYDRATE AND SODIUM CHLORIDE 1000 ML: 150; 5; 450 INJECTION, SOLUTION INTRAVENOUS at 20:33

## 2022-06-23 RX ADMIN — FENTANYL CITRATE 50 MCG: 50 INJECTION, SOLUTION INTRAMUSCULAR; INTRAVENOUS at 08:25

## 2022-06-23 RX ADMIN — GLYCOPYRROLATE 0.6 MG: 0.2 INJECTION INTRAMUSCULAR; INTRAVENOUS at 14:50

## 2022-06-23 RX ADMIN — FENTANYL CITRATE 50 MCG: 50 INJECTION, SOLUTION INTRAMUSCULAR; INTRAVENOUS at 08:30

## 2022-06-23 RX ADMIN — ACETAMINOPHEN 1000 MG: 500 TABLET ORAL at 00:08

## 2022-06-23 RX ADMIN — Medication 100 MCG: at 12:51

## 2022-06-23 RX ADMIN — NICARDIPINE HYDROCHLORIDE 2.5 MG/HR: 0.1 INJECTION INTRAVENOUS at 16:11

## 2022-06-23 RX ADMIN — FENTANYL CITRATE 50 MCG: 50 INJECTION, SOLUTION INTRAMUSCULAR; INTRAVENOUS at 07:44

## 2022-06-23 RX ADMIN — Medication 50 MCG: at 09:19

## 2022-06-23 RX ADMIN — SODIUM CHLORIDE: 9 INJECTION, SOLUTION INTRAVENOUS at 15:21

## 2022-06-23 RX ADMIN — NICARDIPINE HYDROCHLORIDE 5 MG/HR: 0.1 INJECTION INTRAVENOUS at 21:54

## 2022-06-23 RX ADMIN — METHOCARBAMOL TABLETS 750 MG: 750 TABLET, COATED ORAL at 21:51

## 2022-06-23 RX ADMIN — POTASSIUM CHLORIDE, DEXTROSE MONOHYDRATE AND SODIUM CHLORIDE: 150; 5; 450 INJECTION, SOLUTION INTRAVENOUS at 00:13

## 2022-06-23 RX ADMIN — ROCURONIUM BROMIDE 50 MG: 10 INJECTION INTRAVENOUS at 07:44

## 2022-06-23 RX ADMIN — PROPOFOL 20 MG: 10 INJECTION, EMULSION INTRAVENOUS at 09:29

## 2022-06-23 RX ADMIN — Medication 2 G: at 08:26

## 2022-06-23 RX ADMIN — Medication 50 MCG: at 13:56

## 2022-06-23 RX ADMIN — PROTAMINE SULFATE 10 MG: 10 INJECTION, SOLUTION INTRAVENOUS at 13:59

## 2022-06-23 RX ADMIN — FENTANYL CITRATE 50 MCG: 50 INJECTION, SOLUTION INTRAMUSCULAR; INTRAVENOUS at 14:21

## 2022-06-23 RX ADMIN — SODIUM CHLORIDE: 9 INJECTION, SOLUTION INTRAVENOUS at 11:58

## 2022-06-23 RX ADMIN — FENTANYL CITRATE 50 MCG: 50 INJECTION, SOLUTION INTRAMUSCULAR; INTRAVENOUS at 14:27

## 2022-06-23 RX ADMIN — FENTANYL CITRATE 50 MCG: 50 INJECTION, SOLUTION INTRAMUSCULAR; INTRAVENOUS at 14:45

## 2022-06-23 RX ADMIN — HYDROMORPHONE HYDROCHLORIDE 0.5 MG: 1 INJECTION, SOLUTION INTRAMUSCULAR; INTRAVENOUS; SUBCUTANEOUS at 15:44

## 2022-06-23 RX ADMIN — HEPARIN SODIUM 7500 UNITS: 1000 INJECTION INTRAVENOUS; SUBCUTANEOUS at 10:56

## 2022-06-23 RX ADMIN — GABAPENTIN 300 MG: 300 CAPSULE ORAL at 18:54

## 2022-06-23 RX ADMIN — PROPOFOL 30 MG: 10 INJECTION, EMULSION INTRAVENOUS at 10:28

## 2022-06-23 RX ADMIN — IBUPROFEN 400 MG: 400 TABLET, FILM COATED ORAL at 21:51

## 2022-06-23 RX ADMIN — GLYCOPYRROLATE 0.2 MG: 0.2 INJECTION INTRAMUSCULAR; INTRAVENOUS at 08:29

## 2022-06-23 RX ADMIN — Medication 2 G: at 12:23

## 2022-06-23 RX ADMIN — PROPOFOL 90 MG: 10 INJECTION, EMULSION INTRAVENOUS at 12:53

## 2022-06-23 RX ADMIN — IBUPROFEN 400 MG: 400 TABLET, FILM COATED ORAL at 18:53

## 2022-06-23 RX ADMIN — SODIUM CHLORIDE: 9 INJECTION, SOLUTION INTRAVENOUS at 07:44

## 2022-06-23 RX ADMIN — METOPROLOL TARTRATE 25 MG: 25 TABLET ORAL at 21:03

## 2022-06-23 RX ADMIN — ROCURONIUM BROMIDE 20 MG: 10 INJECTION INTRAVENOUS at 12:01

## 2022-06-23 RX ADMIN — HEPARIN SODIUM 4000 UNITS: 1000 INJECTION INTRAVENOUS; SUBCUTANEOUS at 11:47

## 2022-06-23 RX ADMIN — Medication 1250 MG: at 21:56

## 2022-06-23 RX ADMIN — Medication 2.5 MG: at 13:10

## 2022-06-23 RX ADMIN — ROCURONIUM BROMIDE 30 MG: 10 INJECTION INTRAVENOUS at 09:40

## 2022-06-23 RX ADMIN — FENTANYL CITRATE 50 MCG: 50 INJECTION, SOLUTION INTRAMUSCULAR; INTRAVENOUS at 08:00

## 2022-06-23 RX ADMIN — Medication 2.5 MG: at 13:00

## 2022-06-23 RX ADMIN — Medication 25 MCG: at 08:29

## 2022-06-23 RX ADMIN — MAGNESIUM SULFATE HEPTAHYDRATE 3000 MG: 500 INJECTION, SOLUTION INTRAMUSCULAR; INTRAVENOUS at 21:06

## 2022-06-23 RX ADMIN — PROPOFOL 20 MG: 10 INJECTION, EMULSION INTRAVENOUS at 08:36

## 2022-06-23 RX ADMIN — PIPERACILLIN AND TAZOBACTAM 3375 MG: 3; .375 INJECTION, POWDER, FOR SOLUTION INTRAVENOUS at 19:25

## 2022-06-23 RX ADMIN — Medication 0.2 MG/KG/HR: at 15:49

## 2022-06-23 RX ADMIN — ROCURONIUM BROMIDE 20 MG: 10 INJECTION INTRAVENOUS at 10:32

## 2022-06-23 RX ADMIN — OXYCODONE 10 MG: 5 TABLET ORAL at 05:05

## 2022-06-23 RX ADMIN — PRAVASTATIN SODIUM 40 MG: 20 TABLET ORAL at 21:03

## 2022-06-23 RX ADMIN — PROPOFOL 150 MG: 10 INJECTION, EMULSION INTRAVENOUS at 07:44

## 2022-06-23 RX ADMIN — Medication 5 MG: at 12:59

## 2022-06-23 RX ADMIN — DEXAMETHASONE SODIUM PHOSPHATE 10 MG: 10 INJECTION INTRAMUSCULAR; INTRAVENOUS at 08:37

## 2022-06-23 RX ADMIN — PIPERACILLIN AND TAZOBACTAM 3375 MG: 3; .375 INJECTION, POWDER, FOR SOLUTION INTRAVENOUS at 02:16

## 2022-06-23 RX ADMIN — ONDANSETRON 4 MG: 2 INJECTION INTRAMUSCULAR; INTRAVENOUS at 14:15

## 2022-06-23 RX ADMIN — ONDANSETRON 4 MG: 2 INJECTION, SOLUTION INTRAMUSCULAR; INTRAVENOUS at 20:10

## 2022-06-23 RX ADMIN — OXYCODONE 10 MG: 5 TABLET ORAL at 15:38

## 2022-06-23 RX ADMIN — Medication 50 MCG: at 13:54

## 2022-06-23 RX ADMIN — PROPOFOL 50 MG: 10 INJECTION, EMULSION INTRAVENOUS at 08:00

## 2022-06-23 RX ADMIN — FENTANYL CITRATE 50 MCG: 50 INJECTION, SOLUTION INTRAMUSCULAR; INTRAVENOUS at 09:35

## 2022-06-23 RX ADMIN — PROTAMINE SULFATE 20 MG: 10 INJECTION, SOLUTION INTRAVENOUS at 14:02

## 2022-06-23 RX ADMIN — METHOCARBAMOL TABLETS 750 MG: 750 TABLET, COATED ORAL at 18:54

## 2022-06-23 ASSESSMENT — PAIN DESCRIPTION - FREQUENCY
FREQUENCY: CONTINUOUS
FREQUENCY: INTERMITTENT

## 2022-06-23 ASSESSMENT — PAIN SCALES - GENERAL
PAINLEVEL_OUTOF10: 8
PAINLEVEL_OUTOF10: 3
PAINLEVEL_OUTOF10: 8

## 2022-06-23 ASSESSMENT — PAIN DESCRIPTION - PAIN TYPE
TYPE: CHRONIC PAIN
TYPE: CHRONIC PAIN

## 2022-06-23 ASSESSMENT — PAIN DESCRIPTION - ONSET
ONSET: PROGRESSIVE
ONSET: ON-GOING

## 2022-06-23 ASSESSMENT — PAIN DESCRIPTION - ORIENTATION
ORIENTATION: RIGHT;LOWER
ORIENTATION: RIGHT

## 2022-06-23 ASSESSMENT — PAIN DESCRIPTION - LOCATION
LOCATION: FOOT
LOCATION: FOOT

## 2022-06-23 ASSESSMENT — PAIN DESCRIPTION - DESCRIPTORS
DESCRIPTORS: BURNING
DESCRIPTORS: THROBBING

## 2022-06-23 NOTE — PROGRESS NOTES
[address] [facility] Pharmacokinetic Monitoring Service - Vancomycin    Consulting Provider: Elzbieta Calzada   Indication: SSTI  Target Concentration: Goal AUC/LULA 400-600 mg*hr/L  Day of Therapy: 5  Additional Antimicrobials: zosyn    Pertinent Laboratory Values: Wt Readings from Last 1 Encounters:   06/23/22 161 lb 6 oz (73.2 kg)     Temp Readings from Last 1 Encounters:   06/23/22 98.4 °F (36.9 °C) (Oral)     Estimated Creatinine Clearance: 143 mL/min (A) (based on SCr of 0.54 mg/dL (L)). No results for input(s): CREATININE, WBC in the last 72 hours. Invalid input(s):  BUN  Procalcitonin:     Pertinent Cultures:  Culture Date Source Results        MRSA Nasal Swab: N/A. Non-respiratory infection.     Recent vancomycin administrations                     vancomycin (VANCOCIN) 1250 mg in sodium chloride 0.9% 250 mL IVPB (mg) 1,250 mg New Bag 06/22/22 2204     1,250 mg New Bag  1008     1,250 mg New Bag 06/21/22 2140     1,250 mg New Bag  0850     1,250 mg New Bag 06/20/22 2036                    Assessment:  Date/Time Current Dose Concentration Timing of Concentration (h) AUC          Note: Serum concentrations collected for AUC dosing may appear elevated if collected in close proximity to the dose administered, this is not necessarily an indication of toxicity    Plan:  Current dosing regimen is therapeutic  Continue current dose  Pharmacy will continue to monitor patient and adjust therapy as indicated    Thank you for the consult,  RENITA Beltran [facility]  6/23/2022 3:16 PM

## 2022-06-23 NOTE — PROGRESS NOTES
Patient arrival via bed to room 1022 from CVOR S/P ax-fem, fem-fem bypass with Dr Rosa Dee at this time. Pt accompanied to unit from [place] [address] by RN, [address], and CRNA. Pt placed on monitor and all invasive lines connected. Report received bedside from CRNA, as well as 30 min prior via telephone from [facility]. ALL questions answered and concerns addressed.

## 2022-06-23 NOTE — ANESTHESIA POSTPROCEDURE EVALUATION
Department of Anesthesiology  Postprocedure Note    Patient: Chad Contreras  MRN: [id number]  YOB: 1965  Date of evaluation: 6/23/2022      Procedure Summary     Date: 06/23/22 Room / Location: [address]    Anesthesia Start: 7582 Anesthesia Stop: 6503    Procedure: AX-FEM, FEM-FEM BYPASS, X2 GORE PROPATEN GRAFTS 8MM X 80CM, X2 0.8CM X 8CM VASCUGUARD PATCH ** CELL SAVER** (Bilateral ) Diagnosis:       Acute occlusion of aortoiliac artery ([address]      (AORTOILIAC OCCLUSION)    Surgeons: Radha Campbell MD Responsible Provider: Breezy Goyal MD    Anesthesia Type: general ASA Status: 4          Anesthesia Type: No value filed.     Elyse Phase I:      Elyse Phase II:        Anesthesia Post Evaluation    Patient location during evaluation: ICU  Patient participation: complete - patient participated  Level of consciousness: awake and alert  Pain score: 3  Airway patency: patent  Nausea & Vomiting: no vomiting and no nausea  Complications: no  Cardiovascular status: hemodynamically stable  Respiratory status: acceptable  Hydration status: stable

## 2022-06-23 NOTE — ANESTHESIA PROCEDURE NOTES
Central Venous Line:    A central venous line was placed using surface landmarks, in the OR for the following indication(s): central venous access and CVP monitoring. Sterility preparation included the following: hand hygiene performed prior to procedure, maximum sterile barriers used and sterile technique used to drape from head to toe. The patient was placed in Trendelenburg position. The right internal jugular vein was prepped. The site was prepped with Chloraprep.double lumen was placed. Intravenous verification was obtained by ultrasound and venous blood return. Post insertion care included: all ports aspirated, all ports flushed easily, guidewire removed intact, Biopatch applied, line sutured in place and dressing applied. During the procedure the patient experienced: EBL < 5mL.       Outcomes: uncomplicated and patient tolerated procedure well  Real-time US image taken/store: yes  Anesthesia type: general..No  Staffing  Anesthesiologist: Henrik Landaverde MD  Preanesthetic Checklist  Completed: patient identified, timeout performed and monitors applied/VS acknowledged

## 2022-06-23 NOTE — OP NOTE
Operative Note      Patient: Dinora Pozo  YOB: 1965  MRN: [id number]    Date of Procedure: 6/23/2022    Pre-Op Diagnosis: AORTOILIAC OCCLUSION with rest pain and ulceration    Post-Op Diagnosis: Same       Procedure:  1. Bilateral common femoral endarterectomy with bovine pericardial patch angioplasty. 2.  Right profunda endarterectomy with bovine pericardial patch angioplasty. 3.  Right axillary to right femoral bypass with 8 mm ringed PTFE. 4.  Right common femoral to left common femoral bypass with 8 mm ringed PTFE. 5.  Mini exploratory laparotomy to investigate the proximal infrarenal aorta. Surgeon(s):  Dinah Mirza MD    Assistant:   Miri Howell    Anesthesia: General    Estimated Blood Loss (mL): 258 cc    Complications: None    Specimens:   None    Implants:  Implant Name Type Inv.  Item Serial No.  Lot No. LRB No. Used Action   GRAFT VASC L80CM DIA8MM PTFE CBAS HEP SURF THN WALLED REM - [id number] Vascular grafts GRAFT VASC L80CM DIA8MM PTFE CBAS HEP SURF THN WALLED REM [id number]  [employer] [facility]-   1 Implanted   PATCH VASC W0.8XL8CM PERIPH BOV PERICARD N PVC N DEHP CRSS - [id number] Vascular grafts PATCH VASC W0.8XL8CM PERIPH BOV PERICARD N PVC N DEHP CRSS [id number] [facility] [id number] Left 1 Implanted   PATCH VASC W0.8XL8CM PERIPH BOV PERICARD N PVC N DEHP CRSS - [id number] Vascular grafts PATCH VASC W0.8XL8CM PERIPH BOV PERICARD N PVC N DEHP CRSS [id number] [facility] [id number] Right 1 Implanted   GRAFT VASC L80CM DIA8MM PTFE CBAS HEP SURF THN WALLED REM - [id number] Vascular grafts GRAFT VASC L80CM DIA8MM PTFE CBAS HEP SURF THN WALLED REM [id number]  [employer] [facility]-  Right 1 Implanted         Drains: None    Findings: Proximal infrarenal aorta had calcification from the renal arteries on its back wall which was very heavy and dense encroaching onto the anterior wall approximately 1 to 2 cm below the level of the renal arteries. The anterior calcifications became very dense approximately 3 to 4 cm below the level of the renal arteries. Circumferential calcification was encountered through the remainder of the infrarenal aorta to and including both iliac arteries. I felt this calcification too dense and too extensive to safely perform aortobifemoral bypass. I did not think I had enough space below the level of the renal arteries to perform an end-to-side proximal anastomosis even with balloon control. For this reason I decided to go ahead with an axillobifemoral bypass with bilateral common femoral endarterectomy and profundofemoral endarterectomy. At the conclusion of the case he had outstanding signals in the profundofemoral arteries. Both SFAs were occluded. The profundofemoral arteries both have plaque which was removed on the right with an arteriotomy that extended onto the profundofemoral artery and on the left with an arteriotomy that went down to the proximal SFA. Remote endarterectomy was performed on the profunda on the left. There were 2 profundofemoral arteries on the left. Both were patent. After closing the patient had palpable femoral pulses bilaterally which was not true prior to the operation. The axillary artery was adequate but diseased as well with calcified disease and posterior plaque. Patient had no change in his radial art line after the proximal anastomosis was completed. Detailed Description of Procedure:   Patient was brought to the operating room and placed in the supine position with his arms tucked at his sides. He was identified as Nanda Harmon for aortobifemoral bypass or axillobifemoral bypass. He understood the conditions dictating which operation would eventually be used. He understood that there were significant risks with clamping and calcified aorta and if I were not enthusiastic about doing this he would end up with an axillofemoral bypass. He agreed to proceed. He was given general anesthetic with endotracheal airway establishment and he had an arterial line established in the right radial artery. A central line was placed in the right IJ. He was given preoperative antibiotics and his neck chest abdomen groins and right lower extremity were prepped and draped in sterile fashion. Groin incisions were made in a longitudinal fashion using a 15 blade. Bovie cautery was used to dissect down to the level of the common femoral artery bilaterally. The common femoral artery was dissected in a circumferential fashion preserving all branch points except for one on the left going medially across the femoral vein. The profundofemoral artery was dissected in a circumferential fashion down to several branch points all of which were controlled with Vesseloops. The SFA was occluded bilaterally. There were extensive pelvic collaterals up to the proximal common femoral artery and distal external iliac artery all controlled with Vesseloops as well. Once adequate control was established with Vesseloops throughout the entirety of the common femoral arteries they were not pulled tight and attention was drawn to the abdomen. The abdomen was opened in a longitudinal fashion from the xiphoid to the umbilicus and a mini laparotomy was employed to investigate the integrity of the aorta. The aorta was extremely calcified and there was only a soft spot of aorta approximately 3 to 4 cm long starting immediately at the renal arteries. This would make a clamp zone and possible proximally below the level of the renal arteries. In addition I did not think that balloon control would be able to achieve my goal as there was not enough room below the level of the renal arteries to place a beveled bifurcated graft. His calcification was approximately 270 degrees around the aorta approximately 2 cm below the level of the renal arteries.   The posterior wall of the aorta at and Endarterectomy was then performed using a dental freer and forceps down to and including the profundofemoral artery. The profundofemoral artery had a good lumen and there were no distal flaps. The proximal common femoral and distal external iliac artery were endarterectomized using a remote endarterectomy technique. Minimal backbleeding was achieved through the external iliac artery. Bovine pericardial patch was then used to per form repair of the artery from distal to proximal in a four-quadrant running suture using 6 oh on a C1. An 8 mm ringed PTFE graft was then tunneled from the axillary incision to the level of the groin. All of the branches were reopened on the right common femoral artery and the common femoral artery was flowing once again. Attention was drawn to the axillary incision where the axillary artery was clamped both proximally and distally and a longitudinal arteriotomy was made measuring approximately 2 cm in length with an 11 blade and Thompson scissors. The graft was cut to length and beveled and sewn onto the artery using 6 oh on a BV 1 in a running four-quadrant fashion. There was minimal bleeding at the conclusion of those maneuvers. This was controlled with thrombin-soaked Gelfoam which was left on the needle holes. There was no anastomotic bleeding except for needle holes. The graft was clamped proximally with a [place]  clamp. The graft was pulled to length and an arteriotomy was made on the bovine pericardial patch measuring approximately 3 cm in length. The graft was then beveled to that length and anastomosed to the common femoral artery on the right using 6-0 Prolene on a BV 1 needle in a four-quadrant running fashion. A graftotomy was then made using an 11 blade and Thompson scissors on the 8 mm graft that was just anastomosed to the common femoral artery.   A second 8 mm ringed PTFE graft was then beveled and anastomosed to the ax [place] bypass graft at the femoral limb graftotomy. This was done with 5 oh PTFE suture in a running four-quadrant fashion. Flow was established after retrograde and anterograde flushing was achieved to the right common femoral artery and the femorofemoral portion of the bypass graft was clamped with a U[place]  clamp. The left common femoral artery was then controlled at all of its branch points including the profundofemoral artery. The common femoral artery was then opened with a longitudinal arteriotomy using 11 blade and Thompson scissors extending down to and including the proximal portion of the SFA. The arteriotomy measured approximately 7 cm in length. Endarterectomy was performed similar to the other side but remote endarterectomy of the profundofemoral arteries was achieved. There was good ostium of the profundofemoral arteries. The artery was then repaired with bovine pericardial patch in a four-quadrant running fashion starting at the distal apex and then working the proximal apex to the middle of the patch on both sides. Antegrade and retrograde flush was achieved with ease and then all control was reestablished and the patch was opened in the longitudinal fashion measuring approximately 2-1/2 cm in length. The femorofemoral bypass graft was then tunneled through the tunnel and brought to the left femoral artery. It was cut to length and rings were removed and the graft was then cut to bevel. That was anastomosed to the patch using 5 oh PTFE suture in a four-quadrant running fashion. Antegrade and retrograde flush was achieved with ease before opening the [place]  and the external iliac artery. The profundal was then opened. There was good flow through the profunda on continuous-wave Doppler and there was good pulses in the profunda. The graft on both sides had needle hole bleeding. This was controlled with thrombin-soaked Gelfoam on both sides. The patient did get 50 mg of protamine.   10 minutes were allowed to elapse before the groin wounds were inspected again. There was no further bleeding at the needle holes. The axillary incision had no significant bleeding. The axillary incision was closed with 2-0 Vicryl in an interrupted simple style at the fascia of the pectoralis major muscle. The skin was then closed with 4-0 nylon in an interrupted vertical mattress style. The skin of the counterincision on the chest wall was closed with 2-0 Vicryl in an interrupted simple style at the subcutaneous tissue and 4-0 nylon in an interrupted vertical mattress style at the level of the skin. The groins were closed with layered 2-0 Vicryl in an interrupted simple style at the femoral sheath, deep subcutaneous tissue, superficial subcutaneous tissue and Karina's fascia. The skin was closed with 4-0 nylon in an interrupted vertical mattress style. The patient maintained his femoral pulses at the conclusion the case. There was no obvious bleeding or hematoma the conclusion of the case. His feet were adequately perfused. All sponge needle and instrument counts were correct at the conclusion of the case. He will go to the ICU and hopefully this will heal his wounds over time.     Electronically signed by Dinah Mirza MD on 6/23/2022 at 2:51 PM

## 2022-06-23 NOTE — ANESTHESIA PROCEDURE NOTES
Arterial Line:    An arterial line was placed using surface landmarks, in the OR for the following indication(s): continuous blood pressure monitoring and blood sampling needed. A 22 gauge (size), 1 and 3/4 inch (length), Arrow (type) catheter was placed, into the right radial artery, secured by Tegaderm and tape. Anesthesia type: General    Events:  patient tolerated procedure well with no complications and EBL < 5mL. 6/23/2022 7:50 AM6/23/2022 7:58 AM  Resident/CRNA: LISSY Marley CRNA  Performed: Resident/CRNA   Preanesthetic Checklist  Completed: patient identified, IV checked, site marked, risks and benefits discussed, surgical/procedural consents, equipment checked, pre-op evaluation, timeout performed, anesthesia consent given, oxygen available, monitors applied/VS acknowledged, fire risk safety assessment completed and verbalized and blood product R/B/A discussed and consented

## 2022-06-23 NOTE — PROGRESS NOTES
Vascular Surgery   Progress Note    PATIENT NAME: Lydia Gordon     TODAY'S DATE: 6/23/2022     SUBJECTIVE:     Pt seen and examined at bedside. VSS, some HTN overnight, afebrile, complains of throbbing pain in R foot that has improved, patient denies CP, SOB, N/V. Patient in good spirits, ready for OR today. OBJECTIVE:   VITALS:  BP (!) 163/91   Pulse 66   Temp 98.4 °F (36.9 °C) (Oral)   Resp 17   Ht 5' 8\" (1.727 m)   Wt 161 lb 6 oz (73.2 kg)   SpO2 95%   BMI 24.54 kg/m²      INTAKE/OUTPUT:      Intake/Output Summary (Last 24 hours) at 6/23/2022 0746  Last data filed at 6/23/2022 0533  Gross per 24 hour   Intake 2380 ml   Output 3025 ml   Net -645 ml     PHYSICAL EXAM:  General Appearance: awake, alert, oriented, in no acute distress  HEENT:  Normocephalic, atraumatic, mucus membranes moist   Heart: Sinu bradycardia  Lungs: normal effort with symmetric rise and fall of chest wall  Abdomen: soft, nondistended, nontender to palpation  Extremities: RLE - toe amputations, delayed capillary refill, monophasic PT signals, LLE - toe amputations, delayed capillary refill, no dopplerable signals. Right foot erythema with draining wound from between first and second toe amputation sites   Skin: Skin color, texture, turgor normal. No rashes or lesions. Data:  CBC:   No results for input(s): WBC, HGB, PLT in the last 72 hours. Chemistry:   No results for input(s): NA, K, CL, CO2, GLUCOSE, BUN, CREATININE, MG, ANIONGAP, LABGLOM, GFRAA, CALCIUM, CAION, PHOS, PSA, PROBNP, TROPHS, CKTOTAL, CKMB, CKMBINDEX, MYOGLOBIN, DIGOXIN, LACTACIDWB in the last 72 hours. Hepatic: No results for input(s): AST, ALT, ALB, ALKPHOS, BILITOT, BILIDIR in the last 72 hours. Coagulation:   No results for input(s): APTT, PROT, INR in the last 72 hours. Radiology Review:    No results found.     ASSESSMENT:  Active Hospital Problems    Diagnosis Date Noted    PVD (peripheral vascular disease) ([address] [I73.9] 06/16/2022     Priority:

## 2022-06-23 NOTE — CONSENT
Informed Consent for Blood Component Transfusion Note    I have discussed with the patient the rationale for blood component transfusion; its benefits in treating or preventing fatigue, organ damage, or death; and its risk which includes mild transfusion reactions, rare risk of blood borne infection, or more serious but rare reactions. I have discussed the alternatives to transfusion, including the risk and consequences of not receiving transfusion. The patient had an opportunity to ask questions and had agreed to proceed with transfusion of blood components.     Electronically signed by Cheryl Mcdonald MD on 6/23/22 at 2:50 PM EDT

## 2022-06-24 LAB
ABO/RH: NORMAL
ABSOLUTE EOS #: 0 K/UL (ref 0–0.44)
ABSOLUTE IMMATURE GRANULOCYTE: 0.14 K/UL (ref 0–0.3)
ABSOLUTE LYMPH #: 2.07 K/UL (ref 1.1–3.7)
ABSOLUTE MONO #: 1.79 K/UL (ref 0.1–1.2)
ANION GAP SERPL CALCULATED.3IONS-SCNC: 13 MMOL/L (ref 9–17)
ANTIBODY SCREEN: NEGATIVE
ARM BAND NUMBER: NORMAL
BASOPHILS # BLD: 0 % (ref 0–2)
BASOPHILS ABSOLUTE: 0 K/UL (ref 0–0.2)
BLD PROD TYP BPU: NORMAL
BLD PROD TYP BPU: NORMAL
BPU ID: NORMAL
BPU ID: NORMAL
BUN BLDV-MCNC: 5 MG/DL (ref 6–20)
CALCIUM SERPL-MCNC: 8.6 MG/DL (ref 8.6–10.4)
CHLORIDE BLD-SCNC: 103 MMOL/L (ref 98–107)
CO2: 23 MMOL/L (ref 20–31)
CREAT SERPL-MCNC: 0.48 MG/DL (ref 0.7–1.2)
CROSSMATCH RESULT: NORMAL
CROSSMATCH RESULT: NORMAL
DISPENSE STATUS BLOOD BANK: NORMAL
DISPENSE STATUS BLOOD BANK: NORMAL
EOSINOPHILS RELATIVE PERCENT: 0 % (ref 1–4)
EXPIRATION DATE: NORMAL
GFR AFRICAN AMERICAN: >60 ML/MIN
GFR NON-AFRICAN AMERICAN: >60 ML/MIN
GFR SERPL CREATININE-BSD FRML MDRD: ABNORMAL ML/MIN/{1.73_M2}
GLUCOSE BLD-MCNC: 123 MG/DL (ref 70–99)
HCT VFR BLD CALC: 38 % (ref 40.7–50.3)
HEMOGLOBIN: 12.4 G/DL (ref 13–17)
IMMATURE GRANULOCYTES: 1 %
LYMPHOCYTES # BLD: 15 % (ref 24–43)
MAGNESIUM: 2.2 MG/DL (ref 1.6–2.6)
MCH RBC QN AUTO: 30.1 PG (ref 25.2–33.5)
MCHC RBC AUTO-ENTMCNC: 32.6 G/DL (ref 28.4–34.8)
MCV RBC AUTO: 92.2 FL (ref 82.6–102.9)
MONOCYTES # BLD: 13 % (ref 3–12)
MORPHOLOGY: NORMAL
NRBC AUTOMATED: 0 PER 100 WBC
PDW BLD-RTO: 14.3 % (ref 11.8–14.4)
PHOSPHORUS: 2.8 MG/DL (ref 2.5–4.5)
PLATELET # BLD: 403 K/UL (ref 138–453)
PMV BLD AUTO: 8.9 FL (ref 8.1–13.5)
POTASSIUM SERPL-SCNC: 4.2 MMOL/L (ref 3.7–5.3)
RBC # BLD: 4.12 M/UL (ref 4.21–5.77)
SEG NEUTROPHILS: 71 % (ref 36–65)
SEGMENTED NEUTROPHILS ABSOLUTE COUNT: 9.8 K/UL (ref 1.5–8.1)
SODIUM BLD-SCNC: 139 MMOL/L (ref 135–144)
TRANSFUSION STATUS: NORMAL
TRANSFUSION STATUS: NORMAL
UNIT DIVISION: 0
UNIT DIVISION: 0
WBC # BLD: 13.8 K/UL (ref 3.5–11.3)

## 2022-06-24 PROCEDURE — 80048 BASIC METABOLIC PNL TOTAL CA: CPT

## 2022-06-24 PROCEDURE — 85025 COMPLETE CBC W/AUTO DIFF WBC: CPT

## 2022-06-24 PROCEDURE — 2580000003 HC RX 258: Performed by: STUDENT IN AN ORGANIZED HEALTH CARE EDUCATION/TRAINING PROGRAM

## 2022-06-24 PROCEDURE — 6370000000 HC RX 637 (ALT 250 FOR IP): Performed by: STUDENT IN AN ORGANIZED HEALTH CARE EDUCATION/TRAINING PROGRAM

## 2022-06-24 PROCEDURE — 6360000002 HC RX W HCPCS: Performed by: STUDENT IN AN ORGANIZED HEALTH CARE EDUCATION/TRAINING PROGRAM

## 2022-06-24 PROCEDURE — 2060000000 HC ICU INTERMEDIATE R&B

## 2022-06-24 PROCEDURE — 84100 ASSAY OF PHOSPHORUS: CPT

## 2022-06-24 PROCEDURE — 99212 OFFICE O/P EST SF 10 MIN: CPT

## 2022-06-24 PROCEDURE — 2580000003 HC RX 258: Performed by: ANESTHESIOLOGY

## 2022-06-24 PROCEDURE — 83735 ASSAY OF MAGNESIUM: CPT

## 2022-06-24 PROCEDURE — 2500000003 HC RX 250 WO HCPCS: Performed by: STUDENT IN AN ORGANIZED HEALTH CARE EDUCATION/TRAINING PROGRAM

## 2022-06-24 RX ADMIN — IBUPROFEN 400 MG: 400 TABLET, FILM COATED ORAL at 04:24

## 2022-06-24 RX ADMIN — ACETAMINOPHEN 1000 MG: 500 TABLET ORAL at 21:58

## 2022-06-24 RX ADMIN — METHOCARBAMOL TABLETS 750 MG: 750 TABLET, COATED ORAL at 16:57

## 2022-06-24 RX ADMIN — GABAPENTIN 300 MG: 300 CAPSULE ORAL at 14:01

## 2022-06-24 RX ADMIN — HYDRALAZINE HYDROCHLORIDE 10 MG: 20 INJECTION INTRAMUSCULAR; INTRAVENOUS at 21:56

## 2022-06-24 RX ADMIN — METOPROLOL TARTRATE 25 MG: 25 TABLET ORAL at 22:00

## 2022-06-24 RX ADMIN — GABAPENTIN 300 MG: 300 CAPSULE ORAL at 04:24

## 2022-06-24 RX ADMIN — NICARDIPINE HYDROCHLORIDE 5 MG/HR: 0.1 INJECTION INTRAVENOUS at 01:41

## 2022-06-24 RX ADMIN — ACETAMINOPHEN 1000 MG: 500 TABLET ORAL at 04:23

## 2022-06-24 RX ADMIN — IBUPROFEN 400 MG: 400 TABLET, FILM COATED ORAL at 16:57

## 2022-06-24 RX ADMIN — METHOCARBAMOL TABLETS 750 MG: 750 TABLET, COATED ORAL at 08:49

## 2022-06-24 RX ADMIN — NICARDIPINE HYDROCHLORIDE 5 MG/HR: 0.1 INJECTION INTRAVENOUS at 06:14

## 2022-06-24 RX ADMIN — METOPROLOL TARTRATE 25 MG: 25 TABLET ORAL at 08:49

## 2022-06-24 RX ADMIN — SODIUM CHLORIDE, PRESERVATIVE FREE 10 ML: 5 INJECTION INTRAVENOUS at 08:50

## 2022-06-24 RX ADMIN — OXYCODONE 10 MG: 5 TABLET ORAL at 21:56

## 2022-06-24 RX ADMIN — POTASSIUM CHLORIDE, DEXTROSE MONOHYDRATE AND SODIUM CHLORIDE: 150; 5; 450 INJECTION, SOLUTION INTRAVENOUS at 01:31

## 2022-06-24 RX ADMIN — IBUPROFEN 400 MG: 400 TABLET, FILM COATED ORAL at 08:49

## 2022-06-24 RX ADMIN — PIPERACILLIN AND TAZOBACTAM 3375 MG: 3; .375 INJECTION, POWDER, FOR SOLUTION INTRAVENOUS at 10:46

## 2022-06-24 RX ADMIN — PRAVASTATIN SODIUM 40 MG: 20 TABLET ORAL at 22:00

## 2022-06-24 RX ADMIN — Medication 1250 MG: at 08:57

## 2022-06-24 RX ADMIN — Medication 81 MG: at 08:49

## 2022-06-24 RX ADMIN — PIPERACILLIN AND TAZOBACTAM 3375 MG: 3; .375 INJECTION, POWDER, FOR SOLUTION INTRAVENOUS at 01:35

## 2022-06-24 RX ADMIN — ACETAMINOPHEN 1000 MG: 500 TABLET ORAL at 14:03

## 2022-06-24 RX ADMIN — SODIUM CHLORIDE, PRESERVATIVE FREE 10 ML: 5 INJECTION INTRAVENOUS at 22:01

## 2022-06-24 RX ADMIN — METHOCARBAMOL TABLETS 750 MG: 750 TABLET, COATED ORAL at 04:24

## 2022-06-24 RX ADMIN — ENOXAPARIN SODIUM 40 MG: 100 INJECTION SUBCUTANEOUS at 08:49

## 2022-06-24 ASSESSMENT — PAIN DESCRIPTION - LOCATION: LOCATION: GROIN

## 2022-06-24 ASSESSMENT — PAIN DESCRIPTION - ORIENTATION
ORIENTATION: RIGHT;LEFT
ORIENTATION: RIGHT

## 2022-06-24 ASSESSMENT — PAIN SCALES - GENERAL
PAINLEVEL_OUTOF10: 8
PAINLEVEL_OUTOF10: 8
PAINLEVEL_OUTOF10: 4

## 2022-06-24 ASSESSMENT — PAIN DESCRIPTION - DESCRIPTORS
DESCRIPTORS: ACHING
DESCRIPTORS: PRESSURE

## 2022-06-24 ASSESSMENT — PAIN - FUNCTIONAL ASSESSMENT: PAIN_FUNCTIONAL_ASSESSMENT: PREVENTS OR INTERFERES SOME ACTIVE ACTIVITIES AND ADLS

## 2022-06-24 NOTE — PROGRESS NOTES
Dr. Yousuf Solis, trauma/vascular resident, at bedside. Clarified not to place abd binder, RN to change RLE foot dressing, residents will change all other vascular surgery dressings, perform q1h neurovascular checks. Assessed patient's right upper chest dressing as surrounding area is swollen but soft-- stated no need to add ice or pressure dressing. Stated to continue infusing D5% w/ 0.45% NS & 20 K. Will continue to monitor and update physicians as needed.

## 2022-06-24 NOTE — PROGRESS NOTES
Vascular Surgery   Progress Note    PATIENT NAME: Roberto Fung     TODAY'S DATE: 6/24/2022     SUBJECTIVE:     Pt seen and examined at bedside. S/p right axillary to femoral-femoral bypass 6/23/22, VSS, HTN overnight requiring cardene to maintain SBP <140 but weaning off this AM, afebrile, states his feet feel the best they have felt, denies any tingling or pain this morning, incision sites soft and without hematoma or significant strike through, denies any CP, SOB, N/V.    OBJECTIVE:   VITALS:  /68   Pulse 69   Temp 97.7 °F (36.5 °C) (Axillary)   Resp 18   Ht 5' 8\" (1.727 m)   Wt 161 lb 6 oz (73.2 kg)   SpO2 94%   BMI 24.54 kg/m²      INTAKE/OUTPUT:      Intake/Output Summary (Last 24 hours) at 6/24/2022 0636  Last data filed at 6/24/2022 0600  Gross per 24 hour   Intake 7383.38 ml   Output 6030 ml   Net 1353.38 ml     PHYSICAL EXAM:  General Appearance: awake, alert, oriented, in no acute distress  HEENT:  Normocephalic, atraumatic, mucus membranes moist   Heart: Sinu bradycardia  Lungs: normal effort with symmetric rise and fall of chest wall  Abdomen: soft, nondistended, nontender to palpation  Extremities: RLE - toe amputations, biphasis PT and AT signals, LLE - toe amputations, biphasis PT/AT signals. Skin: Skin color, texture, turgor normal.  Incisions: R chest, midline, bilateral groin incisions clean and dry with minimal strike through of the dressings    Data:  CBC:   Recent Labs     06/23/22  1556 06/23/22  2328 06/24/22  0435   WBC 14.6*  --  13.8*   HGB 12.6* 11.7* 12.4*     --  403     Chemistry:   Recent Labs     06/23/22  1556 06/24/22  0435    139   K 4.2 4.2    103   CO2 21 23   GLUCOSE 154* 123*   BUN 5* 5*   CREATININE 0.50* 0.48*   MG 1.5* 2.2   ANIONGAP 12 13   LABGLOM >60 >60   GFRAA >60 >60   CALCIUM 8.3* 8.6   CAION 1.16  --    PHOS 3.0 2.8     Hepatic: No results for input(s): AST, ALT, ALB, ALKPHOS, BILITOT, BILIDIR in the last 72 hours.   Coagulation: Recent Labs     06/23/22  1556   APTT 25.8   INR 1.0       Radiology Review:    No results found. ASSESSMENT:  Active Hospital Problems    Diagnosis Date Noted    [facility]per[facility] [I73.9] 06/16/2022     Priority: Medium    S/P arteriogram of extremity [Z98.890] 06/16/2022     Priority: Medium     1. 59 y.o. male with severe PAD s/p b/l LE angiogram 6/16    Plan:  1. Diet as tolerated  2. D/c esparza  3. D/c arterial line  4. D/c IV fluids  5. D/c cardene  1. Use PO meds and labetalol IV PRN to maintain SBP <160  6. Ok to ambulate and work with therapy  7. Wound ostomy consult for dressing recommendations for RLE  8. Dressing change in AM to operative sites  9. Incentive spirometry and TCDB hourly  10. MMPT and ketamine for one more day  11. Stable wounds to R foot  1. Vancomycin and zosyn to d/c on Sunday  12.  Patient will need d/c on Xarelto 2.5mg BID (PAD dose) in addition to ASA 81mg daily    Electronically signed by Ivone Wilcox DO on 6/24/2022 at 6:36 AM

## 2022-06-24 NOTE — PROGRESS NOTES
[facility] Wound Ostomy  Nurse  Consult Note       NAME:  [place] RECORD NUMBER:  [id number]  AGE: 59 y.o. GENDER: male  : 1965  TODAY'S DATE:  2022    Subjective   Reason for [address] Nurse Evaluation and Assessment: Recommendations for R foot wound healing s/p axillary fem-fem bypass      Rekha Narayan is a 59 y.o. male referred by:   [x] Physician  [] Nursing  [] Other:     Wound Identification:  Wound Type: arterial  Contributing Factors: arterial insufficiency             Wound History: Severe PAD , s/p ax / bi-fem bypass. States his Right #2 toe was injured after stepping on a rock and then gradually become necrotic and fell off. TMA of the left foot prior. Current Wound Care Treatment:  Prior to admission he received Podiatry care via the  [facility] on [facility], using Betadine soaked gauze. Patient Goal of Care:  [x] Wound Healing  [] Odor Control  [] Palliative Care  [] Pain Control   [] Other:         PAST MEDICAL HISTORY        Diagnosis Date    Alcohol abuse     CAD (coronary artery disease)     Convulsions ([address]     Frostbite     Hypertension        PAST SURGICAL HISTORY    Past Surgical History:   Procedure Laterality Date    ABDOMINAL AORTIC ANEURYSM REPAIR Bilateral 2022    AX-FEM, FEM-FEM BYPASS, X2 GORE PROPATEN GRAFTS 8MM X 80CM, X2 0.8CM X 8CM VASCUGUARD PATCH ** CELL SAVER** performed by Liliana Ball MD at [address], [place]      left foot       FAMILY HISTORY    Family History   Problem Relation Age of Onset    Cancer Mother     Heart Disease Father        SOCIAL HISTORY    Social History     Tobacco Use    Smoking status: Current Every Day Smoker     Packs/day: 1.00     Types: Cigarettes    Smokeless tobacco: Never Used   Vaping Use    Vaping Use: Not on file   Substance Use Topics    Alcohol use:  Yes     Alcohol/week: 12.0 standard drinks     Types: 12 Cans of beer per week     Comment: approx 12 pack daily    Drug use: No       ALLERGIES    Allergies   Allergen Reactions    Shellfish-Derived Products Diarrhea       MEDICATIONS    No current facility-administered medications on file prior to encounter. Current Outpatient Medications on File Prior to Encounter   Medication Sig Dispense Refill    doxycycline hyclate (VIBRA-TABS) 100 MG tablet Take 1 tablet by mouth 2 times daily for 14 days (Patient not taking: Reported on 6/16/2022) 28 tablet 2    ibuprofen (ADVIL;MOTRIN) 800 MG tablet Take 1 tablet by mouth every 8 hours as needed for Pain (Patient not taking: Reported on 6/16/2022) 30 tablet 0    acetaminophen (TYLENOL) 500 MG tablet Take 2 tablets by mouth every 8 hours as needed for Pain 20 tablet 0    pravastatin (PRAVACHOL) 40 MG tablet Take 40 mg by mouth daily      loratadine (CLARITIN) 10 MG tablet Take 10 mg by mouth daily (Patient not taking: Reported on 6/16/2022)      sulfamethoxazole-trimethoprim (BACTRIM DS;SEPTRA DS) 800-160 MG per tablet Take 1 tablet by mouth 2 times daily (Patient not taking: Reported on 6/16/2022)      Misc. Devices MISC Rx: Please dispense one Cane. 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 Difficulty ambulating  Sig: Use cane as needed to assist in ambulating. (Patient not taking: Reported on 6/16/2022) 1 Device 0    Misc. Devices MISC Please dispense a 30 day supply of the following:  4x4 gauze, Kerlix rolls, and paper tape. 1 Device 0    polyethylene glycol (GLYCOLAX) packet Take 17 g by mouth daily as needed. (Patient not taking: Reported on 6/16/2022)      senna (SENOKOT) 8.6 MG tablet Take 1 tablet by mouth nightly. (Patient not taking: Reported on 6/16/2022)      silver sulfADIAZINE (SILVADENE) 1 % cream Apply topically daily. (Patient not taking: Reported on 6/16/2022) 85 g 0    Gauze Pads & Dressings (KERLIX GAUZE ROLL LARGE) MISC by Does not apply route.  (Patient not taking: Reported on 6/16/2022) 14 each 1    doxycycline (VIBRAMYCIN) 100 MG capsule Take 100 mg by mouth 2 times daily.  metoprolol (LOPRESSOR) 25 MG tablet Take 25 mg by mouth 2 times daily.  clopidogrel (PLAVIX) 75 MG tablet Take 75 mg by mouth daily.  aspirin 81 MG tablet Take 81 mg by mouth daily.  oxyCODONE-acetaminophen (PERCOCET) 5-325 MG per tablet Take 1 tablet by mouth.  folic acid (FOLVITE) 1 MG tablet Take 1 mg by mouth daily.  multivitamin (THERAGRAN) per tablet Take 1 tablet by mouth daily. (Patient not taking: Reported on 6/16/2022)      simvastatin (ZOCOR) 10 MG tablet Take 10 mg by mouth nightly. Objective    BP (!) 146/92   Pulse 79   Temp 98.1 °F (36.7 °C) (Oral)   Resp 22   Ht 5' 8\" (1.727 m)   Wt 161 lb 6 oz (73.2 kg)   SpO2 97%   BMI 24.54 kg/m²     LABS:  WBC:    Lab Results   Component Value Date    WBC 13.8 06/24/2022     H/H:    Lab Results   Component Value Date    HGB 12.4 06/24/2022    HCT 38.0 06/24/2022     PTT:    Lab Results   Component Value Date    APTT 25.8 06/23/2022   [APTT}  PT/INR:    Lab Results   Component Value Date    PROTIME 10.5 06/23/2022    PROTIME 13.4 06/16/2022    INR 1.0 06/23/2022     HgBA1c:  No results found for: LABA1C    Assessment   Jd Risk Score: Jd Scale Score: 17    Patient Active Problem List   Diagnosis Code    Frostbite of foot T33.829A    Frostbite with tissue necrosis T34.90XA    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, right leg ([facility]) I70.261    PVD (peripheral vascular disease) ([facility]) I73.9    S/P arteriogram of extremity Z98.890       Measurements:     06/24/22 1515   Wound 06/20/22 Foot Anterior;Right   Date First Assessed/Time First Assessed: 06/20/22 2000   Primary Wound Type:  Other (comment)  Location: Foot  Wound Location Orientation: Anterior;Right   Wound Image    Wound Etiology Arterial   Dressing Status New dressing applied   Wound Cleansed Betadine/povidone iodine   Dressing/Treatment Moist to moist;Betadine swabs/povidone iodine   Dressing Change Due 06/25/22   Wound Assessment Eschar dry   Drainage Amount Small   Drainage Description [place]   Lovely-wound Assessment Hyperpigmented;Dry/flaky     Recommend using Betadine moistened gauze to soften the dry eschar to toe  #2. Discussed his plan for follow up to include resuming visits with Podiatrist at [address] [place] [address]. He has used Betadine gauze for wound care prior to admission. Response to treatment:  Well tolerated by patient. Plan   Plan of Care: If OK with Vascular Service; Dr. Salima Odell [facility] RIGHT FOOT 2ND TOE:  Daily dressing change. Rinse with saline. Moisten gauze 2x2 with dilute Betadine irrigation solution (Pharmacy dispensed). Apply Betadine-moistened gauze to wound, cover with ABD and secure with roll gauze. Current Diet: ADULT DIET; Regular      Discharge Plan:  Placement for patient upon discharge: home with support    Patient appropriate for Outpatient [address] [facility] [address]: Yes  Will resume services at [facility] on [address].       Patient/Caregiver Teaching:  Level of patient/caregiver understanding able to:   [] Indicates understanding       [] Needs reinforcement  [] Unsuccessful      [x] Verbal Understanding  [] Demonstrated understanding       [] No evidence of learning  [] Refused teaching         [] Vanessa Moore RN BSN, Reshma Elliott

## 2022-06-25 LAB
ABSOLUTE EOS #: 0.16 K/UL (ref 0–0.4)
ABSOLUTE IMMATURE GRANULOCYTE: 0 K/UL (ref 0–0.3)
ABSOLUTE LYMPH #: 2.5 K/UL (ref 1–4.8)
ABSOLUTE MONO #: 1.09 K/UL (ref 0.1–0.8)
ANION GAP SERPL CALCULATED.3IONS-SCNC: 12 MMOL/L (ref 9–17)
BASOPHILS # BLD: 0 % (ref 0–2)
BASOPHILS ABSOLUTE: 0 K/UL (ref 0–0.2)
BUN BLDV-MCNC: 8 MG/DL (ref 6–20)
CALCIUM SERPL-MCNC: 8.6 MG/DL (ref 8.6–10.4)
CHLORIDE BLD-SCNC: 101 MMOL/L (ref 98–107)
CO2: 20 MMOL/L (ref 20–31)
CREAT SERPL-MCNC: 0.58 MG/DL (ref 0.7–1.2)
EOSINOPHILS RELATIVE PERCENT: 1 % (ref 1–4)
GFR AFRICAN AMERICAN: >60 ML/MIN
GFR NON-AFRICAN AMERICAN: >60 ML/MIN
GFR SERPL CREATININE-BSD FRML MDRD: ABNORMAL ML/MIN/{1.73_M2}
GLUCOSE BLD-MCNC: 106 MG/DL (ref 70–99)
HCT VFR BLD CALC: 37.8 % (ref 40.7–50.3)
HEMOGLOBIN: 11.8 G/DL (ref 13–17)
IMMATURE GRANULOCYTES: 0 %
LYMPHOCYTES # BLD: 16 % (ref 24–44)
MCH RBC QN AUTO: 29.8 PG (ref 25.2–33.5)
MCHC RBC AUTO-ENTMCNC: 31.2 G/DL (ref 28.4–34.8)
MCV RBC AUTO: 95.5 FL (ref 82.6–102.9)
MONOCYTES # BLD: 7 % (ref 1–7)
MORPHOLOGY: NORMAL
NRBC AUTOMATED: 0 PER 100 WBC
PDW BLD-RTO: 14.3 % (ref 11.8–14.4)
PLATELET # BLD: 381 K/UL (ref 138–453)
PMV BLD AUTO: 9.3 FL (ref 8.1–13.5)
POTASSIUM SERPL-SCNC: 4.2 MMOL/L (ref 3.7–5.3)
RBC # BLD: 3.96 M/UL (ref 4.21–5.77)
SEG NEUTROPHILS: 76 % (ref 36–66)
SEGMENTED NEUTROPHILS ABSOLUTE COUNT: 11.85 K/UL (ref 1.8–7.7)
SODIUM BLD-SCNC: 133 MMOL/L (ref 135–144)
WBC # BLD: 15.6 K/UL (ref 3.5–11.3)

## 2022-06-25 PROCEDURE — 2060000000 HC ICU INTERMEDIATE R&B

## 2022-06-25 PROCEDURE — 6360000002 HC RX W HCPCS: Performed by: STUDENT IN AN ORGANIZED HEALTH CARE EDUCATION/TRAINING PROGRAM

## 2022-06-25 PROCEDURE — 6370000000 HC RX 637 (ALT 250 FOR IP): Performed by: STUDENT IN AN ORGANIZED HEALTH CARE EDUCATION/TRAINING PROGRAM

## 2022-06-25 PROCEDURE — 85025 COMPLETE CBC W/AUTO DIFF WBC: CPT

## 2022-06-25 PROCEDURE — 2580000003 HC RX 258: Performed by: STUDENT IN AN ORGANIZED HEALTH CARE EDUCATION/TRAINING PROGRAM

## 2022-06-25 PROCEDURE — 2500000003 HC RX 250 WO HCPCS: Performed by: STUDENT IN AN ORGANIZED HEALTH CARE EDUCATION/TRAINING PROGRAM

## 2022-06-25 PROCEDURE — A4217 STERILE WATER/SALINE, 500 ML: HCPCS | Performed by: STUDENT IN AN ORGANIZED HEALTH CARE EDUCATION/TRAINING PROGRAM

## 2022-06-25 PROCEDURE — 36415 COLL VENOUS BLD VENIPUNCTURE: CPT

## 2022-06-25 PROCEDURE — 2580000003 HC RX 258: Performed by: ANESTHESIOLOGY

## 2022-06-25 PROCEDURE — 80048 BASIC METABOLIC PNL TOTAL CA: CPT

## 2022-06-25 RX ADMIN — IBUPROFEN 400 MG: 400 TABLET, FILM COATED ORAL at 21:00

## 2022-06-25 RX ADMIN — IBUPROFEN 400 MG: 400 TABLET, FILM COATED ORAL at 00:13

## 2022-06-25 RX ADMIN — ACETAMINOPHEN 1000 MG: 500 TABLET ORAL at 06:26

## 2022-06-25 RX ADMIN — Medication 0.2 MG/KG/HR: at 01:03

## 2022-06-25 RX ADMIN — SODIUM CHLORIDE: 900 IRRIGANT IRRIGATION at 01:06

## 2022-06-25 RX ADMIN — GABAPENTIN 300 MG: 300 CAPSULE ORAL at 12:48

## 2022-06-25 RX ADMIN — SODIUM CHLORIDE, PRESERVATIVE FREE 10 ML: 5 INJECTION INTRAVENOUS at 21:08

## 2022-06-25 RX ADMIN — METHOCARBAMOL TABLETS 750 MG: 750 TABLET, COATED ORAL at 10:37

## 2022-06-25 RX ADMIN — PIPERACILLIN AND TAZOBACTAM 3375 MG: 3; .375 INJECTION, POWDER, FOR SOLUTION INTRAVENOUS at 08:51

## 2022-06-25 RX ADMIN — IBUPROFEN 400 MG: 400 TABLET, FILM COATED ORAL at 10:37

## 2022-06-25 RX ADMIN — SODIUM CHLORIDE: 9 INJECTION, SOLUTION INTRAVENOUS at 02:14

## 2022-06-25 RX ADMIN — ACETAMINOPHEN 1000 MG: 500 TABLET ORAL at 21:00

## 2022-06-25 RX ADMIN — Medication 1250 MG: at 02:17

## 2022-06-25 RX ADMIN — SODIUM CHLORIDE, PRESERVATIVE FREE 10 ML: 5 INJECTION INTRAVENOUS at 21:12

## 2022-06-25 RX ADMIN — METHOCARBAMOL TABLETS 750 MG: 750 TABLET, COATED ORAL at 06:26

## 2022-06-25 RX ADMIN — METHOCARBAMOL TABLETS 750 MG: 750 TABLET, COATED ORAL at 21:00

## 2022-06-25 RX ADMIN — SODIUM CHLORIDE, PRESERVATIVE FREE 10 ML: 5 INJECTION INTRAVENOUS at 21:07

## 2022-06-25 RX ADMIN — PIPERACILLIN AND TAZOBACTAM 3375 MG: 3; .375 INJECTION, POWDER, FOR SOLUTION INTRAVENOUS at 00:07

## 2022-06-25 RX ADMIN — METHOCARBAMOL TABLETS 750 MG: 750 TABLET, COATED ORAL at 19:02

## 2022-06-25 RX ADMIN — METOPROLOL TARTRATE 25 MG: 25 TABLET ORAL at 20:41

## 2022-06-25 RX ADMIN — RIVAROXABAN 20 MG: 20 TABLET, FILM COATED ORAL at 10:31

## 2022-06-25 RX ADMIN — Medication 1250 MG: at 13:32

## 2022-06-25 RX ADMIN — PRAVASTATIN SODIUM 40 MG: 20 TABLET ORAL at 20:41

## 2022-06-25 RX ADMIN — OXYCODONE 10 MG: 5 TABLET ORAL at 06:25

## 2022-06-25 RX ADMIN — SODIUM CHLORIDE 12.5 ML: 9 INJECTION, SOLUTION INTRAVENOUS at 00:06

## 2022-06-25 RX ADMIN — SODIUM CHLORIDE: 900 IRRIGANT IRRIGATION at 13:50

## 2022-06-25 RX ADMIN — METHOCARBAMOL TABLETS 750 MG: 750 TABLET, COATED ORAL at 00:11

## 2022-06-25 RX ADMIN — HYDRALAZINE HYDROCHLORIDE 10 MG: 20 INJECTION INTRAMUSCULAR; INTRAVENOUS at 12:47

## 2022-06-25 RX ADMIN — GABAPENTIN 300 MG: 300 CAPSULE ORAL at 00:10

## 2022-06-25 RX ADMIN — IBUPROFEN 400 MG: 400 TABLET, FILM COATED ORAL at 19:02

## 2022-06-25 RX ADMIN — PIPERACILLIN AND TAZOBACTAM 3375 MG: 3; .375 INJECTION, POWDER, FOR SOLUTION INTRAVENOUS at 19:03

## 2022-06-25 RX ADMIN — ACETAMINOPHEN 1000 MG: 500 TABLET ORAL at 12:48

## 2022-06-25 RX ADMIN — Medication 81 MG: at 08:41

## 2022-06-25 RX ADMIN — GABAPENTIN 300 MG: 300 CAPSULE ORAL at 21:00

## 2022-06-25 RX ADMIN — GABAPENTIN 300 MG: 300 CAPSULE ORAL at 08:41

## 2022-06-25 RX ADMIN — METOPROLOL TARTRATE 25 MG: 25 TABLET ORAL at 08:41

## 2022-06-25 RX ADMIN — Medication 1250 MG: at 20:44

## 2022-06-25 ASSESSMENT — PAIN DESCRIPTION - LOCATION
LOCATION: FOOT
LOCATION: FOOT
LOCATION: FOOT;CHEST
LOCATION: FOOT
LOCATION: ABDOMEN;CHEST;LEG
LOCATION: ABDOMEN;CHEST;LEG

## 2022-06-25 ASSESSMENT — PAIN DESCRIPTION - ONSET: ONSET: PROGRESSIVE

## 2022-06-25 ASSESSMENT — PAIN DESCRIPTION - DESCRIPTORS
DESCRIPTORS: ACHING
DESCRIPTORS: BURNING
DESCRIPTORS: ACHING
DESCRIPTORS: ACHING
DESCRIPTORS: BURNING
DESCRIPTORS: BURNING;THROBBING

## 2022-06-25 ASSESSMENT — PAIN SCALES - GENERAL
PAINLEVEL_OUTOF10: 0
PAINLEVEL_OUTOF10: 5
PAINLEVEL_OUTOF10: 7
PAINLEVEL_OUTOF10: 7
PAINLEVEL_OUTOF10: 5
PAINLEVEL_OUTOF10: 8
PAINLEVEL_OUTOF10: 4
PAINLEVEL_OUTOF10: 6
PAINLEVEL_OUTOF10: 0

## 2022-06-25 ASSESSMENT — PAIN DESCRIPTION - ORIENTATION
ORIENTATION: RIGHT

## 2022-06-25 ASSESSMENT — PAIN - FUNCTIONAL ASSESSMENT
PAIN_FUNCTIONAL_ASSESSMENT: PREVENTS OR INTERFERES SOME ACTIVE ACTIVITIES AND ADLS

## 2022-06-25 ASSESSMENT — PAIN DESCRIPTION - FREQUENCY: FREQUENCY: CONTINUOUS

## 2022-06-25 NOTE — PROGRESS NOTES
Vascular Surgery   Progress Note    PATIENT NAME: Mancel Book     TODAY'S DATE: 6/25/2022     SUBJECTIVE:     Pt seen and examined at bedside. No acute overnight events. Overall doing well. Tolerating diet, abd slightly distended. Passing flatus but no BM yet. Voiding well. Has been OOB to ambulate around room. Has some right foot pain but well controlled. OBJECTIVE:   VITALS:  BP (!) 143/74   Pulse 77   Temp 98.1 °F (36.7 °C) (Oral)   Resp 14   Ht 5' 8\" (1.727 m)   Wt 161 lb 6 oz (73.2 kg)   SpO2 94%   BMI 24.54 kg/m²      INTAKE/OUTPUT:      Intake/Output Summary (Last 24 hours) at 6/25/2022 0840  Last data filed at 6/25/2022 3704  Gross per 24 hour   Intake 2059.69 ml   Output 1750 ml   Net 309.69 ml     PHYSICAL EXAM:  General Appearance: awake, alert, oriented, in no acute distress  HEENT:  Normocephalic, atraumatic, mucus membranes moist   Heart: Sinu bradycardia  Lungs: normal effort with symmetric rise and fall of chest wall  Abdomen: soft, slightly distended, nontender to palpation  Extremities: RLE - toe amputations, biphasis PT and AT signals, LLE - toe amputations, biphasis PT/AT signals. Palpable bilateral femoral pulses  Skin: Skin color, texture, turgor normal.  Incisions: R chest, midline, bilateral groin incisions clean and dry with minimal strike through of the dressings    Data:  CBC:   Recent Labs     06/23/22  1556 06/23/22  1556 06/23/22  2328 06/24/22  0435 06/25/22  0731   WBC 14.6*  --   --  13.8* 15.6*   HGB 12.6*   < > 11.7* 12.4* 11.8*     --   --  403 381    < > = values in this interval not displayed.      Chemistry:   Recent Labs     06/23/22  1556 06/24/22  0435 06/25/22  0731    139 133*   K 4.2 4.2 4.2    103 101   CO2 21 23 20   GLUCOSE 154* 123* 106*   BUN 5* 5* 8   CREATININE 0.50* 0.48* 0.58*   MG 1.5* 2.2  --    ANIONGAP 12 13 12   LABGLOM >60 >60 >60   GFRAA >60 >60 >60   CALCIUM 8.3* 8.6 8.6   CAION 1.16  --   --    PHOS 3.0 2.8  --      Hepatic: No results for input(s): AST, ALT, ALB, ALKPHOS, BILITOT, BILIDIR in the last 72 hours. Coagulation:   Recent Labs     06/23/22  1556   APTT 25.8   INR 1.0       Radiology Review:    No results found. ASSESSMENT:  Active Hospital Problems    Diagnosis Date Noted    PVD (peripheral vascular disease) ([facility] [I73.9] 06/16/2022     Priority: Medium    S/P arteriogram of extremity [Z98.890] 06/16/2022     Priority: Medium     1. 59 y.o. male with severe PAD s/p b/l LE angiogram 6/16  2. POD2 s/p b/l fem endart with patch angioplasty, R profunda endart with patch angioplasty, R ax to R fem bypass, R common fem to L common fem bypass, mini laparotomy    Plan:  1. Diet as tolerated  2. Monitor for bowel function  3. Use PO meds and labetalol IV PRN to maintain SBP <160  4. Ok to ambulate and work with therapy  5. Wound ostomy consult for dressing recommendations for RLE  6. Incisional wounds: OK to leave open to air; if some drainage, okay for dry gauze and light tape  7. Incentive spirometry at least 10 times per hour  1. Goal today is 1500cc  8. MMPT; d/c ketamine gtt  9. Stable wounds to R foot  1. Vancomycin and zosyn to d/c on Sunday  10. Xarelto 20mg daily - will see if insurance can cover dose and discuss with pharmacy. Patient had compromised flow to his extremities and is in need of high dose anticoagulation post operatively to keep his grafts open.      Electronically signed by Aguilar Carpenter DO on 6/25/2022 at 8:40 AM

## 2022-06-25 NOTE — PROGRESS NOTES
Pt c/o RT shoulder pain at incision site, radiating across chest and difficulty breathing. Site swollen pt Spo2 95% on RA, /68 RR 20. PRN given for BP over 160 sys. Writer contacted vascular to assess pt at bedside. Vascular assess pt per vascular site soft mildly tender to place ice if pt want. Pt refused ice and states pain is better. Pt pulses assess Q4 with vitals. Pt has BLE doppler pulses . pt denies increased foot pain.

## 2022-06-26 LAB
ABSOLUTE EOS #: 0.29 K/UL (ref 0–0.44)
ABSOLUTE IMMATURE GRANULOCYTE: 0.06 K/UL (ref 0–0.3)
ABSOLUTE LYMPH #: 2.3 K/UL (ref 1.1–3.7)
ABSOLUTE MONO #: 1.23 K/UL (ref 0.1–1.2)
BASOPHILS # BLD: 1 % (ref 0–2)
BASOPHILS ABSOLUTE: 0.08 K/UL (ref 0–0.2)
EOSINOPHILS RELATIVE PERCENT: 2 % (ref 1–4)
HCT VFR BLD CALC: 33.2 % (ref 40.7–50.3)
HEMOGLOBIN: 10.8 G/DL (ref 13–17)
IMMATURE GRANULOCYTES: 1 %
LYMPHOCYTES # BLD: 18 % (ref 24–43)
MCH RBC QN AUTO: 30.3 PG (ref 25.2–33.5)
MCHC RBC AUTO-ENTMCNC: 32.5 G/DL (ref 28.4–34.8)
MCV RBC AUTO: 93 FL (ref 82.6–102.9)
MONOCYTES # BLD: 9 % (ref 3–12)
NRBC AUTOMATED: 0 PER 100 WBC
PDW BLD-RTO: 14 % (ref 11.8–14.4)
PLATELET # BLD: 362 K/UL (ref 138–453)
PMV BLD AUTO: 9.4 FL (ref 8.1–13.5)
RBC # BLD: 3.57 M/UL (ref 4.21–5.77)
SEG NEUTROPHILS: 69 % (ref 36–65)
SEGMENTED NEUTROPHILS ABSOLUTE COUNT: 9.17 K/UL (ref 1.5–8.1)
VANCOMYCIN TROUGH: 14.2 UG/ML (ref 10–20)
WBC # BLD: 13.1 K/UL (ref 3.5–11.3)

## 2022-06-26 PROCEDURE — 6360000002 HC RX W HCPCS: Performed by: STUDENT IN AN ORGANIZED HEALTH CARE EDUCATION/TRAINING PROGRAM

## 2022-06-26 PROCEDURE — 2580000003 HC RX 258: Performed by: STUDENT IN AN ORGANIZED HEALTH CARE EDUCATION/TRAINING PROGRAM

## 2022-06-26 PROCEDURE — 2580000003 HC RX 258: Performed by: ANESTHESIOLOGY

## 2022-06-26 PROCEDURE — 6370000000 HC RX 637 (ALT 250 FOR IP): Performed by: STUDENT IN AN ORGANIZED HEALTH CARE EDUCATION/TRAINING PROGRAM

## 2022-06-26 PROCEDURE — 36415 COLL VENOUS BLD VENIPUNCTURE: CPT

## 2022-06-26 PROCEDURE — A4217 STERILE WATER/SALINE, 500 ML: HCPCS | Performed by: STUDENT IN AN ORGANIZED HEALTH CARE EDUCATION/TRAINING PROGRAM

## 2022-06-26 PROCEDURE — 80202 ASSAY OF VANCOMYCIN: CPT

## 2022-06-26 PROCEDURE — 2060000000 HC ICU INTERMEDIATE R&B

## 2022-06-26 PROCEDURE — 85025 COMPLETE CBC W/AUTO DIFF WBC: CPT

## 2022-06-26 RX ORDER — PANTOPRAZOLE SODIUM 40 MG/1
40 TABLET, DELAYED RELEASE ORAL
Status: DISCONTINUED | OUTPATIENT
Start: 2022-06-27 | End: 2022-06-27

## 2022-06-26 RX ORDER — OXYCODONE HYDROCHLORIDE 5 MG/1
5 TABLET ORAL EVERY 6 HOURS PRN
Status: DISCONTINUED | OUTPATIENT
Start: 2022-06-26 | End: 2022-06-27

## 2022-06-26 RX ADMIN — METHOCARBAMOL TABLETS 750 MG: 750 TABLET, COATED ORAL at 09:26

## 2022-06-26 RX ADMIN — Medication 81 MG: at 09:26

## 2022-06-26 RX ADMIN — SODIUM CHLORIDE, PRESERVATIVE FREE 10 ML: 5 INJECTION INTRAVENOUS at 21:33

## 2022-06-26 RX ADMIN — RIVAROXABAN 20 MG: 20 TABLET, FILM COATED ORAL at 16:16

## 2022-06-26 RX ADMIN — OXYCODONE 5 MG: 5 TABLET ORAL at 21:33

## 2022-06-26 RX ADMIN — Medication 1250 MG: at 11:56

## 2022-06-26 RX ADMIN — IBUPROFEN 400 MG: 400 TABLET, FILM COATED ORAL at 09:26

## 2022-06-26 RX ADMIN — PRAVASTATIN SODIUM 40 MG: 20 TABLET ORAL at 21:32

## 2022-06-26 RX ADMIN — ACETAMINOPHEN 1000 MG: 500 TABLET ORAL at 06:29

## 2022-06-26 RX ADMIN — METOPROLOL TARTRATE 25 MG: 25 TABLET ORAL at 21:32

## 2022-06-26 RX ADMIN — ACETAMINOPHEN 1000 MG: 500 TABLET ORAL at 16:15

## 2022-06-26 RX ADMIN — GABAPENTIN 300 MG: 300 CAPSULE ORAL at 21:32

## 2022-06-26 RX ADMIN — METHOCARBAMOL TABLETS 750 MG: 750 TABLET, COATED ORAL at 16:16

## 2022-06-26 RX ADMIN — SODIUM CHLORIDE: 900 IRRIGANT IRRIGATION at 14:10

## 2022-06-26 RX ADMIN — IBUPROFEN 400 MG: 400 TABLET, FILM COATED ORAL at 03:46

## 2022-06-26 RX ADMIN — METHOCARBAMOL TABLETS 750 MG: 750 TABLET, COATED ORAL at 21:32

## 2022-06-26 RX ADMIN — PIPERACILLIN AND TAZOBACTAM 3375 MG: 3; .375 INJECTION, POWDER, FOR SOLUTION INTRAVENOUS at 07:18

## 2022-06-26 RX ADMIN — PIPERACILLIN AND TAZOBACTAM 3375 MG: 3; .375 INJECTION, POWDER, FOR SOLUTION INTRAVENOUS at 00:03

## 2022-06-26 RX ADMIN — METOPROLOL TARTRATE 25 MG: 25 TABLET ORAL at 09:26

## 2022-06-26 RX ADMIN — METHOCARBAMOL TABLETS 750 MG: 750 TABLET, COATED ORAL at 03:46

## 2022-06-26 RX ADMIN — PIPERACILLIN AND TAZOBACTAM 3375 MG: 3; .375 INJECTION, POWDER, FOR SOLUTION INTRAVENOUS at 16:19

## 2022-06-26 RX ADMIN — GABAPENTIN 300 MG: 300 CAPSULE ORAL at 14:09

## 2022-06-26 RX ADMIN — SODIUM CHLORIDE, PRESERVATIVE FREE 10 ML: 5 INJECTION INTRAVENOUS at 11:59

## 2022-06-26 ASSESSMENT — PAIN DESCRIPTION - ORIENTATION
ORIENTATION: RIGHT
ORIENTATION: RIGHT;OTHER (COMMENT)
ORIENTATION: RIGHT;LEFT
ORIENTATION: RIGHT
ORIENTATION: LEFT;RIGHT
ORIENTATION: RIGHT

## 2022-06-26 ASSESSMENT — PAIN DESCRIPTION - FREQUENCY
FREQUENCY: CONTINUOUS
FREQUENCY: CONTINUOUS

## 2022-06-26 ASSESSMENT — PAIN SCALES - GENERAL
PAINLEVEL_OUTOF10: 5
PAINLEVEL_OUTOF10: 7
PAINLEVEL_OUTOF10: 7
PAINLEVEL_OUTOF10: 6
PAINLEVEL_OUTOF10: 7
PAINLEVEL_OUTOF10: 7
PAINLEVEL_OUTOF10: 4
PAINLEVEL_OUTOF10: 7
PAINLEVEL_OUTOF10: 0

## 2022-06-26 ASSESSMENT — PAIN DESCRIPTION - DESCRIPTORS
DESCRIPTORS: ACHING
DESCRIPTORS: BURNING;ACHING
DESCRIPTORS: ACHING
DESCRIPTORS: BURNING
DESCRIPTORS: BURNING
DESCRIPTORS: ACHING
DESCRIPTORS: BURNING

## 2022-06-26 ASSESSMENT — PAIN DESCRIPTION - ONSET
ONSET: PROGRESSIVE
ONSET: PROGRESSIVE

## 2022-06-26 ASSESSMENT — PAIN DESCRIPTION - LOCATION
LOCATION: CHEST;FOOT
LOCATION: FOOT;GROIN
LOCATION: GROIN
LOCATION: FOOT;GROIN
LOCATION: FOOT;GROIN
LOCATION: INCISION;FOOT
LOCATION: SHOULDER;ARM
LOCATION: GROIN

## 2022-06-26 ASSESSMENT — PAIN - FUNCTIONAL ASSESSMENT
PAIN_FUNCTIONAL_ASSESSMENT: PREVENTS OR INTERFERES SOME ACTIVE ACTIVITIES AND ADLS
PAIN_FUNCTIONAL_ASSESSMENT: PREVENTS OR INTERFERES SOME ACTIVE ACTIVITIES AND ADLS
PAIN_FUNCTIONAL_ASSESSMENT: ACTIVITIES ARE NOT PREVENTED

## 2022-06-26 ASSESSMENT — PAIN DESCRIPTION - PAIN TYPE: TYPE: CHRONIC PAIN

## 2022-06-26 NOTE — PROGRESS NOTES
Vascular Surgery   Progress Note    PATIENT NAME: Bonnie Burroughs     TODAY'S DATE: 6/26/2022     SUBJECTIVE:     Pt seen and examined at bedside. No acute overnight events. Patient states that he feels a little bit nauseous and has not been taking in much food. States that he is passing more flatus today but had diarrhea yesterday and was \"black\"; 1 episode of diarrhea. Denies any abdominal pain. Afebrile, vitals normal and stable. Voiding well. OBJECTIVE:   VITALS:  /65   Pulse 67   Temp 97.9 °F (36.6 °C) (Oral)   Resp 12   Ht 5' 8\" (1.727 m)   Wt 161 lb 6 oz (73.2 kg)   SpO2 94%   BMI 24.54 kg/m²      INTAKE/OUTPUT:      Intake/Output Summary (Last 24 hours) at 6/26/2022 0933  Last data filed at 6/26/2022 0354  Gross per 24 hour   Intake --   Output 1100 ml   Net -1100 ml     PHYSICAL EXAM:  General Appearance: awake, alert, oriented, in no acute distress  HEENT:  Normocephalic, atraumatic, mucus membranes moist   Heart: Sinu bradycardia  Lungs: normal effort with symmetric rise and fall of chest wall  Abdomen: soft, slightly distended, nontender to palpation  Extremities: RLE - toe amputations, biphasis PT and AT signals, LLE - toe amputations, biphasis PT/AT signals.  Palpable bilateral femoral pulses  Skin: Skin color, texture, turgor normal.  Incisions: R chest, midline, bilateral groin incisions clean and dry    Data:  CBC:   Recent Labs     06/24/22  0435 06/25/22  0731 06/26/22  0854   WBC 13.8* 15.6* 13.1*   HGB 12.4* 11.8* 10.8*    381 362     Chemistry:   Recent Labs     06/23/22  1556 06/24/22  0435 06/25/22  0731    139 133*   K 4.2 4.2 4.2    103 101   CO2 21 23 20   GLUCOSE 154* 123* 106*   BUN 5* 5* 8   CREATININE 0.50* 0.48* 0.58*   MG 1.5* 2.2  --    ANIONGAP 12 13 12   LABGLOM >60 >60 >60   GFRAA >60 >60 >60   CALCIUM 8.3* 8.6 8.6   CAION 1.16  --   --    PHOS 3.0 2.8  --      Hepatic: No results for input(s): AST, ALT, ALB, ALKPHOS, BILITOT, BILIDIR in the last 72 hours. Coagulation:   Recent Labs     06/23/22  1556   APTT 25.8   INR 1.0       Radiology Review:    No results found. ASSESSMENT:  Active Hospital Problems    Diagnosis Date Noted    PVD (peripheral vascular disease) ([facility] [I73.9] 06/16/2022     Priority: Medium    S/P arteriogram of extremity [Z98.890] 06/16/2022     Priority: Medium     1. 59 y.o. male with severe PAD s/p b/l LE angiogram 6/16  2. POD3 s/p b/l fem endart with patch angioplasty, R profunda endart with patch angioplasty, R ax to R fem bypass, R common fem to L common fem bypass, mini laparotomy    Plan:  1. Diet as tolerated  1. Supplements - encourage ensure shakes  2. Monitor for hematochezia/melena  3. Use PO meds and labetalol IV PRN to maintain SBP <160  4. Ok to ambulate and work with therapy  5. Wound ostomy consulted for dressing recommendations for RLE  6. Incisional wounds: OK to leave open to air; if some drainage, okay for dry gauze and light tape  7. Incentive spirometry at least 10 times per hour  1. Goal today is 1500cc  8. Tylenol, Neurontin, Robaxin; [place] 5 every 6 as needed. Discontinue Motrin due to melena  9. Add Protonix  10. Stable wounds to R foot  1. Vancomycin and zosyn to d/c today  11. Xarelto 20mg daily - continue to monitor for melena  12. Dispo: Pending nausea, monitoring melena.   Anticipate discharge tomorrow vs Tuesday    Electronically signed by Lluvia Ivan DO on 6/26/2022 at 9:33 AM

## 2022-06-26 NOTE — PROGRESS NOTES
[address] [facility] Pharmacokinetic Monitoring Service - Vancomycin    Consulting Provider: Obinna Hensley   Indication: SSTI  Target Concentration: Goal trough of 10-15 mg/L and AUC/LULA <500 mg*hr/L  Day of Therapy: 8  Additional Antimicrobials: Zosyn    Pertinent Laboratory Values: Wt Readings from Last 1 Encounters:   06/23/22 161 lb 6 oz (73.2 kg)     Temp Readings from Last 1 Encounters:   06/26/22 97.9 °F (36.6 °C) (Oral)     Estimated Creatinine Clearance: 133 mL/min (A) (based on SCr of 0.58 mg/dL (L)). Recent Labs     06/24/22  0435 06/24/22  0435 06/25/22  0731 06/26/22  0854   CREATININE 0.48*  --  0.58*  --    WBC 13.8*   < > 15.6* 13.1*    < > = values in this interval not displayed. Pertinent Cultures:  Culture Date Source Results   none none none   MRSA Nasal Swab: N/A. Non-respiratory infection. Recent vancomycin administrations                     vancomycin (VANCOCIN) 1250 mg in sodium chloride 0.9% 250 mL IVPB (mg) 1,250 mg New Bag 06/25/22 2044     1,250 mg New Bag  1332     1,250 mg New Bag  0217     1,250 mg New Bag 06/24/22 0857     1,250 mg New Bag 06/23/22 2156                    Assessment:  Date/Time Current Dose Concentration Timing of Concentration (h) AUC   6/26 @0724 1250mg q12h 14.2 ~10.5h post dose 489   Note: Serum concentrations collected for AUC dosing may appear elevated if collected in close proximity to the dose administered, this is not necessarily an indication of toxicity    Plan:  Current dosing regimen is therapeutic  Continue current dose. Per vascular, vancomycin scheduled to end today.    Repeat vancomycin concentration not currently ordered   Pharmacy will continue to monitor patient and adjust therapy as indicated    Thank you for the consult,  Alee Garrido, [facility]  6/26/2022 11:04 AM

## 2022-06-27 LAB
HCT VFR BLD CALC: 32.6 % (ref 40.7–50.3)
HEMOGLOBIN: 10.6 G/DL (ref 13–17)

## 2022-06-27 PROCEDURE — 36415 COLL VENOUS BLD VENIPUNCTURE: CPT

## 2022-06-27 PROCEDURE — 6370000000 HC RX 637 (ALT 250 FOR IP): Performed by: STUDENT IN AN ORGANIZED HEALTH CARE EDUCATION/TRAINING PROGRAM

## 2022-06-27 PROCEDURE — 2580000003 HC RX 258: Performed by: STUDENT IN AN ORGANIZED HEALTH CARE EDUCATION/TRAINING PROGRAM

## 2022-06-27 PROCEDURE — 6360000002 HC RX W HCPCS: Performed by: STUDENT IN AN ORGANIZED HEALTH CARE EDUCATION/TRAINING PROGRAM

## 2022-06-27 PROCEDURE — 2060000000 HC ICU INTERMEDIATE R&B

## 2022-06-27 PROCEDURE — A4217 STERILE WATER/SALINE, 500 ML: HCPCS | Performed by: STUDENT IN AN ORGANIZED HEALTH CARE EDUCATION/TRAINING PROGRAM

## 2022-06-27 PROCEDURE — 85018 HEMOGLOBIN: CPT

## 2022-06-27 PROCEDURE — 97530 THERAPEUTIC ACTIVITIES: CPT

## 2022-06-27 PROCEDURE — 2580000003 HC RX 258: Performed by: ANESTHESIOLOGY

## 2022-06-27 PROCEDURE — 97162 PT EVAL MOD COMPLEX 30 MIN: CPT

## 2022-06-27 PROCEDURE — 85014 HEMATOCRIT: CPT

## 2022-06-27 PROCEDURE — 2500000003 HC RX 250 WO HCPCS: Performed by: STUDENT IN AN ORGANIZED HEALTH CARE EDUCATION/TRAINING PROGRAM

## 2022-06-27 RX ORDER — OXYCODONE HYDROCHLORIDE 5 MG/1
5 TABLET ORAL EVERY 8 HOURS PRN
Qty: 15 TABLET | Refills: 0 | Status: SHIPPED | OUTPATIENT
Start: 2022-06-27 | End: 2022-07-02

## 2022-06-27 RX ORDER — GABAPENTIN 300 MG/1
300 CAPSULE ORAL 3 TIMES DAILY
Qty: 21 CAPSULE | Refills: 0 | Status: SHIPPED | OUTPATIENT
Start: 2022-06-27 | End: 2022-07-04

## 2022-06-27 RX ORDER — OXYCODONE HYDROCHLORIDE 5 MG/1
5 TABLET ORAL EVERY 8 HOURS PRN
Status: DISCONTINUED | OUTPATIENT
Start: 2022-06-27 | End: 2022-06-28 | Stop reason: HOSPADM

## 2022-06-27 RX ORDER — ACETAMINOPHEN 500 MG
1000 TABLET ORAL EVERY 8 HOURS SCHEDULED
Qty: 42 TABLET | Refills: 0 | Status: SHIPPED | OUTPATIENT
Start: 2022-06-27 | End: 2022-07-04

## 2022-06-27 RX ORDER — PANTOPRAZOLE SODIUM 40 MG/1
40 TABLET, DELAYED RELEASE ORAL
Status: DISCONTINUED | OUTPATIENT
Start: 2022-06-27 | End: 2022-06-28 | Stop reason: HOSPADM

## 2022-06-27 RX ORDER — LISINOPRIL 10 MG/1
10 TABLET ORAL DAILY
Qty: 30 TABLET | Refills: 3 | Status: SHIPPED | OUTPATIENT
Start: 2022-06-27 | End: 2022-06-27

## 2022-06-27 RX ORDER — PANTOPRAZOLE SODIUM 40 MG/1
40 TABLET, DELAYED RELEASE ORAL
Qty: 30 TABLET | Refills: 3 | Status: SHIPPED | OUTPATIENT
Start: 2022-06-27

## 2022-06-27 RX ORDER — IPRATROPIUM BROMIDE AND ALBUTEROL SULFATE 2.5; .5 MG/3ML; MG/3ML
1 SOLUTION RESPIRATORY (INHALATION) EVERY 4 HOURS PRN
Status: DISCONTINUED | OUTPATIENT
Start: 2022-06-27 | End: 2022-06-28 | Stop reason: HOSPADM

## 2022-06-27 RX ORDER — METHOCARBAMOL 750 MG/1
750 TABLET, FILM COATED ORAL 3 TIMES DAILY
Qty: 21 TABLET | Refills: 0 | Status: SHIPPED | OUTPATIENT
Start: 2022-06-27 | End: 2022-07-04

## 2022-06-27 RX ORDER — HYDROCHLOROTHIAZIDE 25 MG/1
25 TABLET ORAL NIGHTLY
Status: DISCONTINUED | OUTPATIENT
Start: 2022-06-27 | End: 2022-06-27

## 2022-06-27 RX ORDER — LISINOPRIL 10 MG/1
10 TABLET ORAL DAILY
Qty: 30 TABLET | Refills: 3 | Status: SHIPPED | OUTPATIENT
Start: 2022-06-27

## 2022-06-27 RX ORDER — LISINOPRIL 10 MG/1
10 TABLET ORAL DAILY
Status: DISCONTINUED | OUTPATIENT
Start: 2022-06-27 | End: 2022-06-28 | Stop reason: HOSPADM

## 2022-06-27 RX ORDER — HYDROCHLOROTHIAZIDE 25 MG/1
25 TABLET ORAL ONCE
Status: COMPLETED | OUTPATIENT
Start: 2022-06-27 | End: 2022-06-27

## 2022-06-27 RX ORDER — HYDROCHLOROTHIAZIDE 25 MG/1
25 TABLET ORAL DAILY
Qty: 30 TABLET | Refills: 3 | Status: ON HOLD | OUTPATIENT
Start: 2022-06-27 | End: 2022-08-10 | Stop reason: HOSPADM

## 2022-06-27 RX ORDER — ASPIRIN 81 MG/1
81 TABLET ORAL DAILY
Qty: 30 TABLET | Refills: 3 | Status: SHIPPED | OUTPATIENT
Start: 2022-06-27

## 2022-06-27 RX ORDER — HYDROCHLOROTHIAZIDE 25 MG/1
25 TABLET ORAL DAILY
Status: DISCONTINUED | OUTPATIENT
Start: 2022-06-28 | End: 2022-06-28 | Stop reason: HOSPADM

## 2022-06-27 RX ADMIN — Medication 10 MG: at 00:48

## 2022-06-27 RX ADMIN — PANTOPRAZOLE SODIUM 40 MG: 40 TABLET, DELAYED RELEASE ORAL at 07:18

## 2022-06-27 RX ADMIN — PANTOPRAZOLE SODIUM 40 MG: 40 TABLET, DELAYED RELEASE ORAL at 16:30

## 2022-06-27 RX ADMIN — LISINOPRIL 10 MG: 10 TABLET ORAL at 12:38

## 2022-06-27 RX ADMIN — ACETAMINOPHEN 1000 MG: 500 TABLET ORAL at 09:30

## 2022-06-27 RX ADMIN — SODIUM CHLORIDE: 900 IRRIGANT IRRIGATION at 09:26

## 2022-06-27 RX ADMIN — METHOCARBAMOL TABLETS 750 MG: 750 TABLET, COATED ORAL at 21:45

## 2022-06-27 RX ADMIN — GABAPENTIN 300 MG: 300 CAPSULE ORAL at 21:45

## 2022-06-27 RX ADMIN — PIPERACILLIN AND TAZOBACTAM 3375 MG: 3; .375 INJECTION, POWDER, FOR SOLUTION INTRAVENOUS at 00:36

## 2022-06-27 RX ADMIN — GABAPENTIN 300 MG: 300 CAPSULE ORAL at 14:26

## 2022-06-27 RX ADMIN — SODIUM CHLORIDE, PRESERVATIVE FREE 10 ML: 5 INJECTION INTRAVENOUS at 09:25

## 2022-06-27 RX ADMIN — GABAPENTIN 300 MG: 300 CAPSULE ORAL at 07:14

## 2022-06-27 RX ADMIN — Medication 1250 MG: at 00:41

## 2022-06-27 RX ADMIN — METHOCARBAMOL TABLETS 750 MG: 750 TABLET, COATED ORAL at 16:18

## 2022-06-27 RX ADMIN — PRAVASTATIN SODIUM 40 MG: 20 TABLET ORAL at 21:44

## 2022-06-27 RX ADMIN — METOPROLOL TARTRATE 25 MG: 25 TABLET ORAL at 09:22

## 2022-06-27 RX ADMIN — METHOCARBAMOL TABLETS 750 MG: 750 TABLET, COATED ORAL at 09:22

## 2022-06-27 RX ADMIN — SODIUM CHLORIDE, PRESERVATIVE FREE 10 ML: 5 INJECTION INTRAVENOUS at 09:24

## 2022-06-27 RX ADMIN — METHOCARBAMOL TABLETS 750 MG: 750 TABLET, COATED ORAL at 03:47

## 2022-06-27 RX ADMIN — HYDROCHLOROTHIAZIDE 25 MG: 25 TABLET ORAL at 18:15

## 2022-06-27 RX ADMIN — OXYCODONE 5 MG: 5 TABLET ORAL at 09:32

## 2022-06-27 RX ADMIN — RIVAROXABAN 20 MG: 20 TABLET, FILM COATED ORAL at 18:16

## 2022-06-27 RX ADMIN — Medication 81 MG: at 09:22

## 2022-06-27 RX ADMIN — OXYCODONE 5 MG: 5 TABLET ORAL at 18:15

## 2022-06-27 RX ADMIN — ACETAMINOPHEN 1000 MG: 500 TABLET ORAL at 00:36

## 2022-06-27 RX ADMIN — OXYCODONE 5 MG: 5 TABLET ORAL at 03:50

## 2022-06-27 RX ADMIN — METOPROLOL TARTRATE 25 MG: 25 TABLET ORAL at 21:45

## 2022-06-27 RX ADMIN — ACETAMINOPHEN 1000 MG: 500 TABLET ORAL at 18:12

## 2022-06-27 ASSESSMENT — PAIN DESCRIPTION - DESCRIPTORS
DESCRIPTORS: ACHING
DESCRIPTORS: ACHING;TENDER

## 2022-06-27 ASSESSMENT — PAIN SCALES - GENERAL
PAINLEVEL_OUTOF10: 8
PAINLEVEL_OUTOF10: 8
PAINLEVEL_OUTOF10: 7
PAINLEVEL_OUTOF10: 8
PAINLEVEL_OUTOF10: 7
PAINLEVEL_OUTOF10: 7
PAINLEVEL_OUTOF10: 5
PAINLEVEL_OUTOF10: 7
PAINLEVEL_OUTOF10: 7

## 2022-06-27 ASSESSMENT — PAIN DESCRIPTION - LOCATION
LOCATION: FOOT;ARM
LOCATION: ABDOMEN;GROIN
LOCATION: SHOULDER

## 2022-06-27 ASSESSMENT — PAIN - FUNCTIONAL ASSESSMENT
PAIN_FUNCTIONAL_ASSESSMENT: ACTIVITIES ARE NOT PREVENTED

## 2022-06-27 ASSESSMENT — PAIN DESCRIPTION - ORIENTATION
ORIENTATION: RIGHT
ORIENTATION: RIGHT;LOWER
ORIENTATION: RIGHT
ORIENTATION: RIGHT;LOWER

## 2022-06-27 NOTE — PROGRESS NOTES
Vascular Surgery   Progress Note    PATIENT NAME: Dinora Pozo     TODAY'S DATE: 6/27/2022     SUBJECTIVE:     Pt seen and examined at bedside. No acute overnight events. Patient doing well this AM, endorses mild pain in the RUE brachioradialis that he describes as \"muscle ache\", explained this is likely due to exertion as his R radial pulse is strong. Palpable L femoral pulse, incisions CDI, good UOP, 1x small loose BM this morning pt states still dark. Hgb stable. OBJECTIVE:   VITALS:  BP (!) 153/80   Pulse 88   Temp 98.2 °F (36.8 °C) (Oral)   Resp 25   Ht 5' 8\" (1.727 m)   Wt 161 lb 6 oz (73.2 kg)   SpO2 93%   BMI 24.54 kg/m²      INTAKE/OUTPUT:      Intake/Output Summary (Last 24 hours) at 6/27/2022 7957  Last data filed at 6/27/2022 0518  Gross per 24 hour   Intake 600 ml   Output 2725 ml   Net -2125 ml     PHYSICAL EXAM:  General Appearance: awake, alert, oriented, in no acute distress  HEENT:  Normocephalic, atraumatic, mucus membranes moist   Heart: Sinu bradycardia  Lungs: normal effort with symmetric rise and fall of chest wall  Abdomen: soft, slightly distended, nontender to palpation  Extremities: RLE - toe amputations, biphasis PT and AT signals, LLE - toe amputations, biphasis PT/AT signals. Palpable bilateral femoral pulses  Skin: Skin color, texture, turgor normal.  Incisions: R chest, midline, bilateral groin incisions clean and dry    Data:  CBC:   Recent Labs     06/25/22  0731 06/26/22  0854 06/27/22  0325   WBC 15.6* 13.1*  --    HGB 11.8* 10.8* 10.6*    362  --      Chemistry:   Recent Labs     06/25/22  0731   *   K 4.2      CO2 20   GLUCOSE 106*   BUN 8   CREATININE 0.58*   ANIONGAP 12   LABGLOM >60   GFRAA >60   CALCIUM 8.6     Hepatic: No results for input(s): AST, ALT, ALB, ALKPHOS, BILITOT, BILIDIR in the last 72 hours. Coagulation:   No results for input(s): APTT, PROT, INR in the last 72 hours.     Radiology Review:    No results found.      ASSESSMENT:  Active Hospital Problems    Diagnosis Date Noted    PVD (peripheral vascular disease) ([facility] [I73.9] 06/16/2022     Priority: Medium    S/P arteriogram of extremity [Z98.890] 06/16/2022     Priority: Medium     1. 59 y.o. male with severe PAD s/p b/l LE angiogram 6/16  2. POD4 s/p b/l fem endart with patch angioplasty, R profunda endart with patch angioplasty, R ax to R fem bypass, R common fem to L common fem bypass, mini laparotomy    Plan:  1. Diet as tolerated  1. Supplements - encourage ensure shakes  2. Ok to ambulate and work with therapy  1. F/u PT/OT recs  3. Wound ostomy consulted for dressing recommendations for RLE  4. Incentive spirometry at least 10 times per hour  5. Tylenol, Neurontin, Robaxin; [place] 5 every 8 as needed  1. Hold NSAIDS for melena  6. Continue protonix BID  1. Plan d/c on 3mo supply  7. Xarelto 20mg daily - continue to monitor for melena  8.  Dispo: Pending nausea, monitoring melena expect d/c late today vs. AM    Electronically signed by Scarlett Pinedo DO on 6/27/2022 at 6:23 AM

## 2022-06-27 NOTE — PROGRESS NOTES
Rt foot #2 toe dressing recently and well completed by patient's primary RN. He reports he is ready for discharge home. Dressing change steps verbally reviewed, sample supplies for home provided and reviewed with patient. Patient reports he will follow up to [facility] for further wound care. Denies any questions or concerns at this time.

## 2022-06-27 NOTE — PROGRESS NOTES
Called to pts bedside d/t pt c/o swelling to groin area. Upon assessment, pts entire groin appears enlarged including shaft of penis. Skin is taut and area feels hard upon palpation compared with abdomen which feels soft. Pt c/o left groin tenderness with palpation. No c/o back pain. Femoral and abdominal incision sites appear normal. Bilateral pedal and posterior tibial pulses audible via doppler. Pt also stating he is still having dark brown/black loose stools. Dr. Nacho Hollins paged to bedside to assess further. Dr. Nacho Hollins at bedside. Assessed pt. Per Dr. Nacho Hollins, some swelling in groin expected. But will delay discharge and watch overnight. Instructed to continue to monitor. Orders placed to further address hypertension. H & H and BMP ordered for a.m. Hat placed in toilet to collect and assess stool for melena.

## 2022-06-27 NOTE — PROGRESS NOTES
Pts BP remains elevated at 161/92 despite receiving metoprolol one hour ago. Notified Dr. Benjamin Aguirre. Received orders for Lisinopril 10mg daily.
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
## 2022-06-28 VITALS
WEIGHT: 161.38 LBS | TEMPERATURE: 97.7 F | HEART RATE: 82 BPM | HEIGHT: 68 IN | BODY MASS INDEX: 24.46 KG/M2 | OXYGEN SATURATION: 94 % | DIASTOLIC BLOOD PRESSURE: 78 MMHG | RESPIRATION RATE: 16 BRPM | SYSTOLIC BLOOD PRESSURE: 139 MMHG

## 2022-06-28 LAB
ANION GAP SERPL CALCULATED.3IONS-SCNC: 14 MMOL/L (ref 9–17)
BUN BLDV-MCNC: 4 MG/DL (ref 6–20)
CALCIUM SERPL-MCNC: 9.3 MG/DL (ref 8.6–10.4)
CHLORIDE BLD-SCNC: 100 MMOL/L (ref 98–107)
CO2: 22 MMOL/L (ref 20–31)
CREAT SERPL-MCNC: 0.55 MG/DL (ref 0.7–1.2)
GFR AFRICAN AMERICAN: >60 ML/MIN
GFR NON-AFRICAN AMERICAN: >60 ML/MIN
GFR SERPL CREATININE-BSD FRML MDRD: ABNORMAL ML/MIN/{1.73_M2}
GLUCOSE BLD-MCNC: 94 MG/DL (ref 70–99)
HCT VFR BLD CALC: 37.4 % (ref 40.7–50.3)
HEMOGLOBIN: 11.7 G/DL (ref 13–17)
POTASSIUM SERPL-SCNC: 4.2 MMOL/L (ref 3.7–5.3)
SODIUM BLD-SCNC: 136 MMOL/L (ref 135–144)

## 2022-06-28 PROCEDURE — 2580000003 HC RX 258: Performed by: STUDENT IN AN ORGANIZED HEALTH CARE EDUCATION/TRAINING PROGRAM

## 2022-06-28 PROCEDURE — 36415 COLL VENOUS BLD VENIPUNCTURE: CPT

## 2022-06-28 PROCEDURE — 6370000000 HC RX 637 (ALT 250 FOR IP): Performed by: STUDENT IN AN ORGANIZED HEALTH CARE EDUCATION/TRAINING PROGRAM

## 2022-06-28 PROCEDURE — A4217 STERILE WATER/SALINE, 500 ML: HCPCS | Performed by: STUDENT IN AN ORGANIZED HEALTH CARE EDUCATION/TRAINING PROGRAM

## 2022-06-28 PROCEDURE — 85018 HEMOGLOBIN: CPT

## 2022-06-28 PROCEDURE — 85014 HEMATOCRIT: CPT

## 2022-06-28 PROCEDURE — 80048 BASIC METABOLIC PNL TOTAL CA: CPT

## 2022-06-28 RX ADMIN — RIVAROXABAN 20 MG: 20 TABLET, FILM COATED ORAL at 17:25

## 2022-06-28 RX ADMIN — LISINOPRIL 10 MG: 10 TABLET ORAL at 08:17

## 2022-06-28 RX ADMIN — ACETAMINOPHEN 1000 MG: 500 TABLET ORAL at 02:33

## 2022-06-28 RX ADMIN — ACETAMINOPHEN 1000 MG: 500 TABLET ORAL at 09:21

## 2022-06-28 RX ADMIN — OXYCODONE 5 MG: 5 TABLET ORAL at 03:32

## 2022-06-28 RX ADMIN — PANTOPRAZOLE SODIUM 40 MG: 40 TABLET, DELAYED RELEASE ORAL at 08:12

## 2022-06-28 RX ADMIN — METHOCARBAMOL TABLETS 750 MG: 750 TABLET, COATED ORAL at 16:51

## 2022-06-28 RX ADMIN — Medication 81 MG: at 08:16

## 2022-06-28 RX ADMIN — METHOCARBAMOL TABLETS 750 MG: 750 TABLET, COATED ORAL at 03:32

## 2022-06-28 RX ADMIN — HYDROCHLOROTHIAZIDE 25 MG: 25 TABLET ORAL at 08:17

## 2022-06-28 RX ADMIN — OXYCODONE 5 MG: 5 TABLET ORAL at 16:22

## 2022-06-28 RX ADMIN — PANTOPRAZOLE SODIUM 40 MG: 40 TABLET, DELAYED RELEASE ORAL at 16:23

## 2022-06-28 RX ADMIN — SODIUM CHLORIDE: 900 IRRIGANT IRRIGATION at 10:48

## 2022-06-28 RX ADMIN — METHOCARBAMOL TABLETS 750 MG: 750 TABLET, COATED ORAL at 09:19

## 2022-06-28 RX ADMIN — METOPROLOL TARTRATE 25 MG: 25 TABLET ORAL at 09:18

## 2022-06-28 RX ADMIN — GABAPENTIN 300 MG: 300 CAPSULE ORAL at 09:18

## 2022-06-28 RX ADMIN — GABAPENTIN 300 MG: 300 CAPSULE ORAL at 17:26

## 2022-06-28 ASSESSMENT — PAIN DESCRIPTION - LOCATION
LOCATION: GROIN;LEG;ABDOMEN
LOCATION: FOOT;GROIN
LOCATION: GROIN;LEG;ABDOMEN

## 2022-06-28 ASSESSMENT — PAIN - FUNCTIONAL ASSESSMENT
PAIN_FUNCTIONAL_ASSESSMENT: ACTIVITIES ARE NOT PREVENTED

## 2022-06-28 ASSESSMENT — PAIN DESCRIPTION - ORIENTATION
ORIENTATION: RIGHT;LEFT;MID
ORIENTATION: RIGHT;LEFT;MID
ORIENTATION: RIGHT;LOWER

## 2022-06-28 ASSESSMENT — PAIN DESCRIPTION - DESCRIPTORS
DESCRIPTORS: ACHING

## 2022-06-28 ASSESSMENT — PAIN SCALES - GENERAL
PAINLEVEL_OUTOF10: 7
PAINLEVEL_OUTOF10: 7
PAINLEVEL_OUTOF10: 8
PAINLEVEL_OUTOF10: 7
PAINLEVEL_OUTOF10: 7

## 2022-06-28 NOTE — FLOWSHEET NOTE
[address] [facility]  PROGRESS NOTE    Shift date: 6/28/2022  Shift day: Tuesday   Shift # 1    Room # 4942/6765-70   Name: Claudia Christianson                Judaism:    [facility]:     Referral: Routine Visit    Admit Date & Time: 6/16/2022  4:16 PM    Assessment:  Claudia Christianson is a 59 y.o. male. Upon entering the room writer observes patient dressed and awaiting discharge. Patient spoke of his health issues and appeared to be in good spirits. Intervention:  Writer introduced self and title as  Writer offered space for patient  to express feelings, needs, and concerns and provided a ministry presence. Outcome:  Patient expressed appreciation for visit. Plan:  Chaplains will remain available to offer spiritual and emotional support as needed.       Electronically signed by Patti Alba on 6/28/2022 at 3:03 PM.  [facility]  [phone]       06/28/22 1501   Encounter Summary   Service Provided For: Patient   Referral/Consult From: Sandra   Last Encounter  06/28/22   Complexity of Encounter Moderate   Begin Time 1426   End Time  1438   Total Time Calculated 12 min   Encounter    Type Follow up   Assessment/Intervention/Outcome   Assessment Coping;Calm   Intervention Active listening;Explored/Affirmed feelings, thoughts, concerns;Prayer (assurance of)/[place]   Outcome Engaged in conversation;Expressed feelings, needs, and concerns;Expressed Gratitude;Receptive

## 2022-06-28 NOTE — PROGRESS NOTES
Vascular Surgery   Progress Note    PATIENT NAME: Larissa Franklin     TODAY'S DATE: 6/28/2022     SUBJECTIVE:     Pt seen and examined at bedside. VSS, some episodes HTN yesterday improved with addition PO meds. Patient had one loose dark BM yesterday afternoon prompting patient to stay additional night, states he feels well today and has no acute complaints. Tolerating PO, ambulating with walker, UOP adequate, pain controlled, incision site CDI, mild penile edema improved. OBJECTIVE:   VITALS:  /72   Pulse 76   Temp 97.9 °F (36.6 °C)   Resp 21   Ht 5' 8\" (1.727 m)   Wt 161 lb 6 oz (73.2 kg)   SpO2 94%   BMI 24.54 kg/m²      INTAKE/OUTPUT:    No intake or output data in the 24 hours ending 06/28/22 0654  PHYSICAL EXAM:  General Appearance: awake, alert, oriented, in no acute distress  HEENT:  Normocephalic, atraumatic, mucus membranes moist   Heart: Sinu bradycardia  Lungs: normal effort with symmetric rise and fall of chest wall  Abdomen: soft, slightly distended, nontender to palpation  Extremities: RLE - toe amputations, biphasis PT and AT signals, LLE - toe amputations, biphasis PT/AT signals. Palpable bilateral femoral pulses  Skin: Skin color, texture, turgor normal.  Incisions: R chest, midline, bilateral groin incisions clean and dry    Data:  CBC:   Recent Labs     06/25/22  0731 06/26/22  0854 06/27/22  0325   WBC 15.6* 13.1*  --    HGB 11.8* 10.8* 10.6*    362  --      Chemistry:   Recent Labs     06/25/22  0731   *   K 4.2      CO2 20   GLUCOSE 106*   BUN 8   CREATININE 0.58*   ANIONGAP 12   LABGLOM >60   GFRAA >60   CALCIUM 8.6     Hepatic: No results for input(s): AST, ALT, ALB, ALKPHOS, BILITOT, BILIDIR in the last 72 hours. Coagulation:   No results for input(s): APTT, PROT, INR in the last 72 hours. Radiology Review:    No results found.       ASSESSMENT:  Active Hospital Problems    Diagnosis Date Noted    [facility]per[facility] [I73.9] 06/16/2022     Priority: Medium    S/P arteriogram of extremity [Z98.890] 06/16/2022     Priority: Medium     1. 59 y.o. male with severe PAD s/p b/l LE angiogram 6/16  2. POD4 s/p b/l fem endart with patch angioplasty, R profunda endart with patch angioplasty, R ax to R fem bypass, R common fem to L common fem bypass, mini laparotomy    Plan:  1. Diet as tolerated  1. Supplements - encourage ensure shakes  2. Ok to ambulate and work with therapy  1. PT evaluated, good mobility with walker, has home walker  3. Incentive spirometry at least 10 times per hour  4. Tylenol, Neurontin, Robaxin; Jenna 5 every 8 as needed  1. Hold NSAIDS for melena  5. Continue protonix BID  1. Plan d/c on 3mo supply  6. Xarelto 20mg daily - continue to monitor for melena  1. If additional episode melena may decrease dose  7.  Dispo: expect d/c today    Electronically signed by Jocelynn oCnley DO on 6/28/2022 at 6:54 AM

## 2022-06-28 NOTE — PROGRESS NOTES
Comprehensive Nutrition Assessment    Type and Reason for Visit:  Reassess    Nutrition Recommendations/Plan:   1. Continue current diet. Encourage/monitor PO intakes as tolerated. 2. Continue to provide Ensure ONS x 2 per day. 3. Will monitor labs, weights, and plan of care. Malnutrition Assessment:  Malnutrition Status: At risk for malnutrition (Comment) (06/28/22 4255)    Context:  Acute Illness     Findings of the 6 clinical characteristics of malnutrition:  Energy Intake:  Mild decrease in energy intake  Weight Loss:  No significant weight loss     Body Fat Loss:  No significant body fat loss   Muscle Mass Loss:  No significant muscle mass loss  Fluid Accumulation:  No significant fluid accumulation   Strength:  Not Performed    Nutrition Assessment:    Pt continues to tolerate an oral diet with variable PO intakes. This morning pt at 50-75% of his breakfast.  Pt reports eating variable amounts of meals but taking fluids well. Noted Ensure ONS ordered - will modify order to provide x 2 per day. Noted pt s/p b/l fem endart with patch angioplasty, R profunda endart with patch angioplasty, R ax to R fem bypass, R common fem to L common fem bypass, mini laparotomy on 6/24. Labs/Meds reviewed. Nutrition Related Findings:    Labs/Meds reviewed. Last BM 6/27. 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  Weight Source: Bed Scale  Current BMI (kg/m2): 24.5  Usual Body Weight: 174 lb (78.9 kg) (4/8/22)  % Weight Change (Calculated): -6.3  Weight Adjustment For: No Adjustment                 BMI Categories: Normal Weight (BMI 18.5-24. 9)    Estimated Daily Nutrient Needs:  Energy Requirements Based On: Kcal/kg  Weight Used for Energy Requirements: Current  Energy (kcal/day): 1800 to 2200 kcal/day (25-30 kcal/kg)  Weight Used for Protein Requirements: Current  Protein (g/day): 70 to 90 g/day (1-1.2 g/kg)  Method Used for Fluid Requirements: Other  Fluid (ml/day): per MD    Nutrition Diagnosis:   · Increased nutrient needs related to increase demand for energy/nutrients (healing) as evidenced by  (R foot wound)    Nutrition Interventions:   Food and/or Nutrient Delivery: Continue Current Diet,Modify Oral Nutrition Supplement  Nutrition Education/Counseling: No recommendation at this time  Coordination of Nutrition Care: Continue to monitor while inpatient       Goals:  Previous Goal Met: Progressing toward Goal(s)  Goals: Meet at least 75% of estimated needs       Nutrition Monitoring and Evaluation:   Behavioral-Environmental Outcomes: None Identified  Food/Nutrient Intake Outcomes: Food and Nutrient Intake,Supplement Intake  Physical Signs/Symptoms Outcomes: Biochemical Data,GI Status,Nutrition Focused Physical Findings,Skin,Weight    Discharge Planning:    No discharge needs at this time     Darrick Mcwilliams RD, LD  Contact: [phone]

## 2022-06-28 NOTE — CARE COORDINATION
[employer]  Agency Booking  Confirmation #:[id number]  Customer:Marium Mccarthy  Phone Number:[phone]  Voucher Number:[email or web address]      Trip Date/Time:6/28/2022 6:50:00 PM      [address], [place], [address], [place], [address] [place] [address].  Ride Notes:please  at main entrance turn around of hospital  Return Ride Ordered:  Ordered By:[address]    Call from floor RN who relates the patient is upset that he was given bus tokens as he will have to walk home from the bus stop, she relates the patient has a foot wound, requested RN collect the bus tokens for the  on the floor and cab booked for pt  to take home

## 2022-06-29 NOTE — DISCHARGE SUMMARY
VASCULAR SURGERY DISCHARGE SUMMARY:    Disposition: DISCHARGE TO HOME    PATIENT NAME: Jennifer Trotter    YOB: 1965  MEDICAL RECORD NO. [id number]  ADMIT DATE: 6/16/2022  ADMITTING PHYSICIAN: Dr. Carolyn Lamar: No primary care provider on file. DISCHARGE DATE:  6/28/2022  6:42 PM  DISPOSITION: to home  ADMITTING DIAGNOSIS:   1. S/P femoral-femoral bypass surgery    2. PVD (peripheral vascular disease) ([facility] [address]    3. S/P arteriogram of extremity    4. 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, right leg ([facility] [address] I70.261    PVD (peripheral vascular disease) ([facility] [address] I73.9    S/P arteriogram of extremity Z98.890     CONSULTANTS:  none    PROCEDURES:    Bilateral femoral endarterectomy with bovine patch angioplasty, R profunda endarterectomy with patch angioplasty, R axillary to R femoral bypass, R common femoral to L common femoral bypass, mini laparotomy to investigate the proximal infraranal aorta (6/23/2022)    Bilateral common femoral arterial access, bilateral lower extremity arteriogram (6/16/2022)    DIAGNOSTIC TESTS:    CTA ABDOMEN PELVIS W CONTRAST    Result Date: 6/17/2022  EXAMINATION: CTA OF THE ABDOMEN AND PELVIS WITH CONTRAST 6/17/2022 6:28 am: TECHNIQUE: CTA of the abdomen and pelvis was performed with the administration of intravenous contrast. Multiplanar reformatted images are provided for review. MIP images are provided for review. Automated exposure control, iterative reconstruction, and/or weight based adjustment of the mA/kV was utilized to reduce the radiation dose to as low as reasonably achievable.  COMPARISON: 05/10/2009 HISTORY: ORDERING SYSTEM PROVIDED HISTORY: eval aorta for bypass procedure TECHNOLOGIST PROVIDED HISTORY: eval aorta for bypass procedure Reason for Exam: eval aorta for bypass procedure FINDINGS: Vascular structures: There is a suboptimal contrast bolus, moderately limiting evaluation. Heavy vascular calcifications are noted within the abdominal aorta. This leads to mild stenosis just proximal to the bifurcation. The celiac artery is patent. There is heavy calcification of the branch vessels. Calcified plaque at the origin of the superior mesenteric artery leads to mild stenosis. Inferior mesenteric artery is diminutive but patent. There is mild to moderate stenosis within the renal arteries bilaterally. Multifocal calcified and atheromatous plaque is seen within the right iliac system. This leads to moderate to high-grade stenosis of the common iliac artery just after the bifurcation. There is then complete occlusion of the right external iliac artery just after the takeoff of the internal iliac artery. There is reconstitution of the common femoral artery. On the left, there is moderate to high-grade stenosis within the left common iliac artery. There is complete or near complete occlusion of the left external iliac artery after the takeoff of the internal iliac artery. Distal reconstitution is seen. Nonvascular structures: Lower chest: Marked bronchial wall thickening bilaterally. Filling defects within the lower lung airways are noted. Bronchiectasis in the right lower lung is seen anteriorly. Base of the heart unremarkable. Organs: No hypervascular abnormalities are detected within the liver. Gallbladder is unremarkable. Band of hypodensity is seen within the lateral aspect of the spleen, likely reflecting scarring from remote trauma or infarct. No acute splenic abnormality detected. Adrenals unremarkable. Pancreas unremarkable. No acute or suspicious renal abnormalities are identified. GI/Bowel: Mild diverticulosis of the large bowel is present without CT evidence of diverticulitis. Large bowel is otherwise unremarkable. Appendix is unremarkable.  Distal esophagus, stomach, duodenal sweep, and the remainder of the small bowel are unremarkable. Pelvis: Contrast is present within the urinary bladder. No free pelvic fluid is found. Peritoneum/Retroperitoneum: No retroperitoneal lymphadenopathy. Bones/Soft Tissues: No acute or suspicious bony abnormality. Diffuse atherosclerotic disease, greatest within the iliac arteries. Specifically, there is complete occlusion of the right external iliac artery just distal to the takeoff of the internal iliac artery. Distal reconstitution of the common femoral artery is seen. On the left, there is complete or near complete occlusion of the left external iliac artery just distal to the takeoff of the internal iliac artery. Distal reconstitution is seen. Bronchial wall thickening, which may be seen in inflammatory conditions such as bronchitis, reactive airways disease, and smoking. Filling defects are noted within the lower lung airways, greater on the right, which may reflect mucous plugging versus aspiration. HOSPITAL COURSE:   Corinne Come is a 59 y.o. male who was admitted on 6/16/2022 s/p arteriogram of the bilateral lower extremities due to bilateral lower extremity ischemia with ulceration. There were significant findings bilaterally:    Findings: The left proximal external iliac artery is occluded. I suspect that the left common iliac artery is occluded. The right common iliac artery is highly diseased with significant stenosis nearly occluding its distal aspect. The left external iliac has significant disease as well and is diminutive in nature. The common femoral arteries remain patent with atherosclerotic disease bilaterally. Both superficial femoral arteries are occluded flush to their origin. The right reconstitutes at the above-knee popliteal.  The left reconstitutes at the mid popliteal.  The posterior tibial is the main outflow to the lower extremity in both lower extremities.   It seems that the peroneal is diminutive bilateral in the anterior tibial is diseased proximally bilaterally. Contrast opacification to the tibial arteries is poor. It takes a significant amount of time for contrast to reach the tibials as well. Due to this, the decision was made to proceed to the operating room, he underwent Bilateral femoral endarterectomy with bovine patch angioplasty, R profunda endarterectomy with patch angioplasty, R axillary to R femoral bypass, R common femoral to L common femoral bypass, mini laparotomy to investigate the proximal infraranal aorta on 6/23/2022. Labs and imaging were followed daily. Diet was slowly advanced. He did have a dark stool postop and NSAIDs were held and he was started on protonix. HgB stayed stable. Postoperatively, he was started on Xarelto 20mg daily and continues on aspirin to keep the graft open. He was discharged with this regimen. At time of discharge, Corinne Come was tolerating a regular diet, having bowel movements, ambulating on his own accord, had adequate analgesia on oral pain medications, and had no signs of symptoms of complications. He was deemed medically stable and discharged to home on 6/28/2022 with instructions to follow up outpatient with vascular surgery. LABS:     Recent Labs     06/27/22  0325 06/28/22  0803   HGB 10.6* 11.7*   HCT 32.6* 37.4*   NA  --  136   K  --  4.2   CL  --  100   CO2  --  22   BUN  --  4*   CREATININE  --  0.55*       DIAGNOSTIC TESTS:     CTA ABDOMEN PELVIS W CONTRAST    Result Date: 6/17/2022  EXAMINATION: CTA OF THE ABDOMEN AND PELVIS WITH CONTRAST 6/17/2022 6:28 am: TECHNIQUE: CTA of the abdomen and pelvis was performed with the administration of intravenous contrast. Multiplanar reformatted images are provided for review. MIP images are provided for review.  Automated exposure control, iterative reconstruction, and/or weight based adjustment of the mA/kV was utilized to reduce the radiation dose to as low as reasonably achievable. COMPARISON: 05/10/2009 HISTORY: ORDERING SYSTEM PROVIDED HISTORY: eval aorta for bypass procedure TECHNOLOGIST PROVIDED HISTORY: eval aorta for bypass procedure Reason for Exam: eval aorta for bypass procedure FINDINGS: Vascular structures: There is a suboptimal contrast bolus, moderately limiting evaluation. Heavy vascular calcifications are noted within the abdominal aorta. This leads to mild stenosis just proximal to the bifurcation. The celiac artery is patent. There is heavy calcification of the branch vessels. Calcified plaque at the origin of the superior mesenteric artery leads to mild stenosis. Inferior mesenteric artery is diminutive but patent. There is mild to moderate stenosis within the renal arteries bilaterally. Multifocal calcified and atheromatous plaque is seen within the right iliac system. This leads to moderate to high-grade stenosis of the common iliac artery just after the bifurcation. There is then complete occlusion of the right external iliac artery just after the takeoff of the internal iliac artery. There is reconstitution of the common femoral artery. On the left, there is moderate to high-grade stenosis within the left common iliac artery. There is complete or near complete occlusion of the left external iliac artery after the takeoff of the internal iliac artery. Distal reconstitution is seen. Nonvascular structures: Lower chest: Marked bronchial wall thickening bilaterally. Filling defects within the lower lung airways are noted. Bronchiectasis in the right lower lung is seen anteriorly. Base of the heart unremarkable. Organs: No hypervascular abnormalities are detected within the liver. Gallbladder is unremarkable. Band of hypodensity is seen within the lateral aspect of the spleen, likely reflecting scarring from remote trauma or infarct. No acute splenic abnormality detected. Adrenals unremarkable. Pancreas unremarkable.   No acute or suspicious renal abnormalities are identified. GI/Bowel: Mild diverticulosis of the large bowel is present without CT evidence of diverticulitis. Large bowel is otherwise unremarkable. Appendix is unremarkable. Distal esophagus, stomach, duodenal sweep, and the remainder of the small bowel are unremarkable. Pelvis: Contrast is present within the urinary bladder. No free pelvic fluid is found. Peritoneum/Retroperitoneum: No retroperitoneal lymphadenopathy. Bones/Soft Tissues: No acute or suspicious bony abnormality. Diffuse atherosclerotic disease, greatest within the iliac arteries. Specifically, there is complete occlusion of the right external iliac artery just distal to the takeoff of the internal iliac artery. Distal reconstitution of the common femoral artery is seen. On the left, there is complete or near complete occlusion of the left external iliac artery just distal to the takeoff of the internal iliac artery. Distal reconstitution is seen. Bronchial wall thickening, which may be seen in inflammatory conditions such as bronchitis, reactive airways disease, and smoking. Filling defects are noted within the lower lung airways, greater on the right, which may reflect mucous plugging versus aspiration. DISCHARGE INSTRUCTIONS:      Discharge Medications:        Medication List      START taking these medications    aspirin 81 MG EC tablet  Take 1 tablet by mouth daily  Replaces: aspirin 81 MG tablet     gabapentin 300 MG capsule  Commonly known as: NEURONTIN  Take 1 capsule by mouth 3 times daily for 7 days.      hydroCHLOROthiazide 25 MG tablet  Commonly known as: HYDRODIURIL  Take 1 tablet by mouth daily     lisinopril 10 MG tablet  Commonly known as: PRINIVIL;ZESTRIL  Take 1 tablet by mouth daily     methocarbamol 750 MG tablet  Commonly known as: ROBAXIN  Take 1 tablet by mouth 3 times daily for 7 days     oxyCODONE 5 MG immediate release tablet  Commonly known as: ROXICODONE  Take 1 tablet by mouth every 8 hours as needed for Pain for up to 5 days. pantoprazole 40 MG tablet  Commonly known as: PROTONIX  Take 1 tablet by mouth 2 times daily (before meals)     rivaroxaban 20 MG Tabs tablet  Commonly known as: XARELTO  Take 1 tablet by mouth daily (with breakfast)        CHANGE how you take these medications    acetaminophen 500 MG tablet  Commonly known as: TYLENOL  Take 2 tablets by mouth every 8 hours for 7 days  What changed:   · when to take this  · reasons to take this        CONTINUE taking these medications    folic acid 1 MG tablet  Commonly known as: FOLVITE     Kerlix Gauze Roll Large Misc  by Does not apply route. metoprolol tartrate 25 MG tablet  Commonly known as: LOPRESSOR     * Misc. Devices Misc  Rx: Please dispense one Cane. 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 Difficulty ambulating  Sig: Use cane as needed to assist in ambulating. * Misc. Devices Misc  Please dispense a 30 day supply of the following:  4x4 gauze, Kerlix rolls, and paper tape. polyethylene glycol 17 g packet  Commonly known as: GLYCOLAX     pravastatin 40 MG tablet  Commonly known as: PRAVACHOL         * This list has 2 medication(s) that are the same as other medications prescribed for you. Read the directions carefully, and ask your doctor or other care provider to review them with you.             STOP taking these medications    aspirin 81 MG tablet  Replaced by: aspirin 81 MG EC tablet     clopidogrel 75 MG tablet  Commonly known as: PLAVIX     doxycycline hyclate 100 MG capsule  Commonly known as: VIBRAMYCIN     doxycycline hyclate 100 MG tablet  Commonly known as: VIBRA-TABS     ibuprofen 800 MG tablet  Commonly known as: ADVIL;MOTRIN     loratadine 10 MG tablet  Commonly known as: CLARITIN     multivitamin per tablet     oxyCODONE-acetaminophen 5-325 MG per tablet  Commonly known as: PERCOCET     senna 8.6 MG tablet  Commonly known as: SENOKOT     silver sulfADIAZINE 1 % cream  Commonly known as: SILVADENE     simvastatin 10 MG tablet  Commonly known as: ZOCOR     sulfamethoxazole-trimethoprim 800-160 MG per tablet  Commonly known as: BACTRIM DS;SEPTRA DS           Where to Get Your Medications      These medications were sent to [facility] [address], [address]  [address], [address] [place] [address]    Phone: [phone]   · acetaminophen 500 MG tablet  · aspirin 81 MG EC tablet  · gabapentin 300 MG capsule  · hydroCHLOROthiazide 25 MG tablet  · lisinopril 10 MG tablet  · methocarbamol 750 MG tablet  · oxyCODONE 5 MG immediate release tablet  · pantoprazole 40 MG tablet  · rivaroxaban 20 MG Tabs tablet       Diet: regular diet as tolerated  Activity: - Avoid strenuous activity or exercise until cleared during follow-up appointment  - No driving or operating heavy machinery while taking narcotics   Wound Care: Wash over incisions daily gently with soap and water  Follow-up:     Call to schedule a follow up appointment with vascular surgeon, Dr. Antonette Suarez, DO  General Surgery PGY-3

## 2022-08-08 ENCOUNTER — APPOINTMENT (OUTPATIENT)
Dept: GENERAL RADIOLOGY | Age: 57
DRG: 638 | End: 2022-08-08
Payer: COMMERCIAL

## 2022-08-08 ENCOUNTER — APPOINTMENT (OUTPATIENT)
Dept: CT IMAGING | Age: 57
DRG: 638 | End: 2022-08-08
Payer: COMMERCIAL

## 2022-08-08 ENCOUNTER — HOSPITAL ENCOUNTER (INPATIENT)
Age: 57
LOS: 2 days | Discharge: HOME | DRG: 638 | End: 2022-08-10
Attending: EMERGENCY MEDICINE | Admitting: INTERNAL MEDICINE
Payer: COMMERCIAL

## 2022-08-08 DIAGNOSIS — L08.9 INFECTED WOUND: Primary | ICD-10-CM

## 2022-08-08 DIAGNOSIS — T14.8XXA INFECTED WOUND: Primary | ICD-10-CM

## 2022-08-08 DIAGNOSIS — M86.9 OSTEOMYELITIS OF RIGHT FOOT, UNSPECIFIED TYPE (HCC): ICD-10-CM

## 2022-08-08 PROBLEM — L03.039 CELLULITIS OF TOE: Status: ACTIVE | Noted: 2022-08-08

## 2022-08-08 PROBLEM — B87.9 INFESTATION, MAGGOT: Status: ACTIVE | Noted: 2022-08-08

## 2022-08-08 LAB
ABSOLUTE EOS #: 0.13 K/UL (ref 0–0.44)
ABSOLUTE IMMATURE GRANULOCYTE: 0.03 K/UL (ref 0–0.3)
ABSOLUTE LYMPH #: 1.62 K/UL (ref 1.1–3.7)
ABSOLUTE MONO #: 0.46 K/UL (ref 0.1–1.2)
ALBUMIN SERPL-MCNC: 4.1 G/DL (ref 3.5–5.2)
ALBUMIN/GLOBULIN RATIO: 1.1 (ref 1–2.5)
ALP BLD-CCNC: 104 U/L (ref 40–129)
ALT SERPL-CCNC: 8 U/L (ref 5–41)
ANION GAP SERPL CALCULATED.3IONS-SCNC: 16 MMOL/L (ref 9–17)
AST SERPL-CCNC: 26 U/L
BASOPHILS # BLD: 1 % (ref 0–2)
BASOPHILS ABSOLUTE: 0.04 K/UL (ref 0–0.2)
BILIRUB SERPL-MCNC: 0.25 MG/DL (ref 0.3–1.2)
BILIRUBIN URINE: NEGATIVE
BUN BLDV-MCNC: 2 MG/DL (ref 6–20)
C-REACTIVE PROTEIN: 4.5 MG/L (ref 0–5)
CALCIUM SERPL-MCNC: 8.8 MG/DL (ref 8.6–10.4)
CHLORIDE BLD-SCNC: 87 MMOL/L (ref 98–107)
CO2: 23 MMOL/L (ref 20–31)
COLOR: YELLOW
CREAT SERPL-MCNC: 0.37 MG/DL (ref 0.7–1.2)
EOSINOPHILS RELATIVE PERCENT: 2 % (ref 1–4)
EPITHELIAL CELLS UA: NORMAL /HPF (ref 0–5)
GFR AFRICAN AMERICAN: >60 ML/MIN
GFR NON-AFRICAN AMERICAN: >60 ML/MIN
GFR SERPL CREATININE-BSD FRML MDRD: ABNORMAL ML/MIN/{1.73_M2}
GLUCOSE BLD-MCNC: 89 MG/DL (ref 70–99)
GLUCOSE URINE: NEGATIVE
HCT VFR BLD CALC: 40.1 % (ref 40.7–50.3)
HEMOGLOBIN: 14 G/DL (ref 13–17)
IMMATURE GRANULOCYTES: 0 %
INR BLD: 0.9
KETONES, URINE: NEGATIVE
LACTIC ACID, WHOLE BLOOD: 2.2 MMOL/L (ref 0.7–2.1)
LEUKOCYTE ESTERASE, URINE: NEGATIVE
LYMPHOCYTES # BLD: 19 % (ref 24–43)
MCH RBC QN AUTO: 30.6 PG (ref 25.2–33.5)
MCHC RBC AUTO-ENTMCNC: 34.9 G/DL (ref 28.4–34.8)
MCV RBC AUTO: 87.6 FL (ref 82.6–102.9)
MONOCYTES # BLD: 6 % (ref 3–12)
NITRITE, URINE: NEGATIVE
NRBC AUTOMATED: 0 PER 100 WBC
PARTIAL THROMBOPLASTIN TIME: 31.1 SEC (ref 20.5–30.5)
PDW BLD-RTO: 15.6 % (ref 11.8–14.4)
PH UA: 6 (ref 5–8)
PLATELET # BLD: 255 K/UL (ref 138–453)
PMV BLD AUTO: 8.7 FL (ref 8.1–13.5)
POTASSIUM SERPL-SCNC: 3.9 MMOL/L (ref 3.7–5.3)
PROTEIN UA: NEGATIVE
PROTHROMBIN TIME: 10.2 SEC (ref 9.1–12.3)
RBC # BLD: 4.58 M/UL (ref 4.21–5.77)
RBC # BLD: ABNORMAL 10*6/UL
RBC UA: NORMAL /HPF (ref 0–2)
SEDIMENTATION RATE, ERYTHROCYTE: 53 MM/HR (ref 0–20)
SEG NEUTROPHILS: 72 % (ref 36–65)
SEGMENTED NEUTROPHILS ABSOLUTE COUNT: 6.06 K/UL (ref 1.5–8.1)
SODIUM BLD-SCNC: 126 MMOL/L (ref 135–144)
SPECIFIC GRAVITY UA: 1.01 (ref 1–1.03)
TOTAL PROTEIN: 7.9 G/DL (ref 6.4–8.3)
TURBIDITY: CLEAR
URINE HGB: NEGATIVE
UROBILINOGEN, URINE: NORMAL
WBC # BLD: 8.3 K/UL (ref 3.5–11.3)
WBC UA: NORMAL /HPF (ref 0–5)

## 2022-08-08 PROCEDURE — 2580000003 HC RX 258: Performed by: STUDENT IN AN ORGANIZED HEALTH CARE EDUCATION/TRAINING PROGRAM

## 2022-08-08 PROCEDURE — 85610 PROTHROMBIN TIME: CPT

## 2022-08-08 PROCEDURE — 99285 EMERGENCY DEPT VISIT HI MDM: CPT

## 2022-08-08 PROCEDURE — 85730 THROMBOPLASTIN TIME PARTIAL: CPT

## 2022-08-08 PROCEDURE — 80053 COMPREHEN METABOLIC PANEL: CPT

## 2022-08-08 PROCEDURE — 6360000002 HC RX W HCPCS: Performed by: INTERNAL MEDICINE

## 2022-08-08 PROCEDURE — 1200000000 HC SEMI PRIVATE

## 2022-08-08 PROCEDURE — 6370000000 HC RX 637 (ALT 250 FOR IP): Performed by: STUDENT IN AN ORGANIZED HEALTH CARE EDUCATION/TRAINING PROGRAM

## 2022-08-08 PROCEDURE — 6360000004 HC RX CONTRAST MEDICATION: Performed by: STUDENT IN AN ORGANIZED HEALTH CARE EDUCATION/TRAINING PROGRAM

## 2022-08-08 PROCEDURE — 71275 CT ANGIOGRAPHY CHEST: CPT

## 2022-08-08 PROCEDURE — 3E10X8Z IRRIGATION OF SKIN AND MUCOUS MEMBRANES USING IRRIGATING SUBSTANCE: ICD-10-PCS | Performed by: INTERNAL MEDICINE

## 2022-08-08 PROCEDURE — 87040 BLOOD CULTURE FOR BACTERIA: CPT

## 2022-08-08 PROCEDURE — 99222 1ST HOSP IP/OBS MODERATE 55: CPT | Performed by: INTERNAL MEDICINE

## 2022-08-08 PROCEDURE — 83605 ASSAY OF LACTIC ACID: CPT

## 2022-08-08 PROCEDURE — 86140 C-REACTIVE PROTEIN: CPT

## 2022-08-08 PROCEDURE — 6360000002 HC RX W HCPCS: Performed by: STUDENT IN AN ORGANIZED HEALTH CARE EDUCATION/TRAINING PROGRAM

## 2022-08-08 PROCEDURE — 73630 X-RAY EXAM OF FOOT: CPT

## 2022-08-08 PROCEDURE — 85652 RBC SED RATE AUTOMATED: CPT

## 2022-08-08 PROCEDURE — 96365 THER/PROPH/DIAG IV INF INIT: CPT

## 2022-08-08 PROCEDURE — 96375 TX/PRO/DX INJ NEW DRUG ADDON: CPT

## 2022-08-08 PROCEDURE — 2580000003 HC RX 258: Performed by: INTERNAL MEDICINE

## 2022-08-08 PROCEDURE — 2500000003 HC RX 250 WO HCPCS

## 2022-08-08 PROCEDURE — 81001 URINALYSIS AUTO W/SCOPE: CPT

## 2022-08-08 PROCEDURE — 6360000002 HC RX W HCPCS

## 2022-08-08 PROCEDURE — 85025 COMPLETE CBC W/AUTO DIFF WBC: CPT

## 2022-08-08 RX ORDER — PANTOPRAZOLE SODIUM 40 MG/1
40 TABLET, DELAYED RELEASE ORAL
Status: DISCONTINUED | OUTPATIENT
Start: 2022-08-09 | End: 2022-08-10 | Stop reason: HOSPADM

## 2022-08-08 RX ORDER — POTASSIUM CHLORIDE 20 MEQ/1
40 TABLET, EXTENDED RELEASE ORAL PRN
Status: DISCONTINUED | OUTPATIENT
Start: 2022-08-08 | End: 2022-08-10 | Stop reason: HOSPADM

## 2022-08-08 RX ORDER — LABETALOL HYDROCHLORIDE 5 MG/ML
10 INJECTION, SOLUTION INTRAVENOUS EVERY 6 HOURS PRN
Status: DISCONTINUED | OUTPATIENT
Start: 2022-08-08 | End: 2022-08-10 | Stop reason: HOSPADM

## 2022-08-08 RX ORDER — LIDOCAINE HYDROCHLORIDE 10 MG/ML
10 INJECTION, SOLUTION EPIDURAL; INFILTRATION; INTRACAUDAL; PERINEURAL ONCE
Status: COMPLETED | OUTPATIENT
Start: 2022-08-08 | End: 2022-08-08

## 2022-08-08 RX ORDER — ATORVASTATIN CALCIUM 40 MG/1
40 TABLET, FILM COATED ORAL NIGHTLY
Status: DISCONTINUED | OUTPATIENT
Start: 2022-08-08 | End: 2022-08-10 | Stop reason: HOSPADM

## 2022-08-08 RX ORDER — MAGNESIUM SULFATE IN WATER 40 MG/ML
2000 INJECTION, SOLUTION INTRAVENOUS PRN
Status: DISCONTINUED | OUTPATIENT
Start: 2022-08-08 | End: 2022-08-10 | Stop reason: HOSPADM

## 2022-08-08 RX ORDER — ASPIRIN 81 MG/1
81 TABLET ORAL DAILY
Status: DISCONTINUED | OUTPATIENT
Start: 2022-08-08 | End: 2022-08-10 | Stop reason: HOSPADM

## 2022-08-08 RX ORDER — HEPARIN SODIUM 1000 [USP'U]/ML
4000 INJECTION, SOLUTION INTRAVENOUS; SUBCUTANEOUS PRN
Status: DISCONTINUED | OUTPATIENT
Start: 2022-08-08 | End: 2022-08-08

## 2022-08-08 RX ORDER — MORPHINE SULFATE 4 MG/ML
4 INJECTION, SOLUTION INTRAMUSCULAR; INTRAVENOUS ONCE
Status: COMPLETED | OUTPATIENT
Start: 2022-08-08 | End: 2022-08-08

## 2022-08-08 RX ORDER — FOLIC ACID 1 MG/1
1 TABLET ORAL DAILY
Status: DISCONTINUED | OUTPATIENT
Start: 2022-08-08 | End: 2022-08-10 | Stop reason: HOSPADM

## 2022-08-08 RX ORDER — HEPARIN SODIUM 1000 [USP'U]/ML
2000 INJECTION, SOLUTION INTRAVENOUS; SUBCUTANEOUS PRN
Status: DISCONTINUED | OUTPATIENT
Start: 2022-08-08 | End: 2022-08-08

## 2022-08-08 RX ORDER — SODIUM CHLORIDE 9 MG/ML
INJECTION, SOLUTION INTRAVENOUS CONTINUOUS
Status: DISCONTINUED | OUTPATIENT
Start: 2022-08-08 | End: 2022-08-10

## 2022-08-08 RX ORDER — ONDANSETRON 2 MG/ML
4 INJECTION INTRAMUSCULAR; INTRAVENOUS EVERY 6 HOURS PRN
Status: DISCONTINUED | OUTPATIENT
Start: 2022-08-08 | End: 2022-08-10 | Stop reason: HOSPADM

## 2022-08-08 RX ORDER — VANCOMYCIN HYDROCHLORIDE 1 G/200ML
15 INJECTION, SOLUTION INTRAVENOUS ONCE
Status: COMPLETED | OUTPATIENT
Start: 2022-08-08 | End: 2022-08-08

## 2022-08-08 RX ORDER — ACETAMINOPHEN 325 MG/1
650 TABLET ORAL EVERY 6 HOURS PRN
Status: DISCONTINUED | OUTPATIENT
Start: 2022-08-08 | End: 2022-08-10 | Stop reason: HOSPADM

## 2022-08-08 RX ORDER — THIAMINE HYDROCHLORIDE 100 MG/ML
100 INJECTION, SOLUTION INTRAMUSCULAR; INTRAVENOUS DAILY
Status: DISCONTINUED | OUTPATIENT
Start: 2022-08-08 | End: 2022-08-09

## 2022-08-08 RX ORDER — HYDRALAZINE HYDROCHLORIDE 25 MG/1
25 TABLET, FILM COATED ORAL EVERY 8 HOURS SCHEDULED
Status: DISCONTINUED | OUTPATIENT
Start: 2022-08-08 | End: 2022-08-09

## 2022-08-08 RX ORDER — POLYETHYLENE GLYCOL 3350 17 G/17G
17 POWDER, FOR SOLUTION ORAL DAILY PRN
Status: DISCONTINUED | OUTPATIENT
Start: 2022-08-08 | End: 2022-08-10 | Stop reason: HOSPADM

## 2022-08-08 RX ORDER — HEPARIN SODIUM AND DEXTROSE 10000; 5 [USP'U]/100ML; G/100ML
5-30 INJECTION INTRAVENOUS CONTINUOUS
Status: DISCONTINUED | OUTPATIENT
Start: 2022-08-08 | End: 2022-08-08

## 2022-08-08 RX ORDER — GABAPENTIN 300 MG/1
300 CAPSULE ORAL 3 TIMES DAILY
Status: DISCONTINUED | OUTPATIENT
Start: 2022-08-08 | End: 2022-08-10 | Stop reason: HOSPADM

## 2022-08-08 RX ORDER — SODIUM CHLORIDE 0.9 % (FLUSH) 0.9 %
5-40 SYRINGE (ML) INJECTION PRN
Status: DISCONTINUED | OUTPATIENT
Start: 2022-08-08 | End: 2022-08-10 | Stop reason: HOSPADM

## 2022-08-08 RX ORDER — ACETAMINOPHEN 500 MG
1000 TABLET ORAL ONCE
Status: COMPLETED | OUTPATIENT
Start: 2022-08-08 | End: 2022-08-08

## 2022-08-08 RX ORDER — POTASSIUM CHLORIDE 7.45 MG/ML
10 INJECTION INTRAVENOUS PRN
Status: DISCONTINUED | OUTPATIENT
Start: 2022-08-08 | End: 2022-08-10 | Stop reason: HOSPADM

## 2022-08-08 RX ORDER — ACETAMINOPHEN 650 MG/1
650 SUPPOSITORY RECTAL EVERY 6 HOURS PRN
Status: DISCONTINUED | OUTPATIENT
Start: 2022-08-08 | End: 2022-08-10 | Stop reason: HOSPADM

## 2022-08-08 RX ORDER — ONDANSETRON 4 MG/1
4 TABLET, ORALLY DISINTEGRATING ORAL EVERY 8 HOURS PRN
Status: DISCONTINUED | OUTPATIENT
Start: 2022-08-08 | End: 2022-08-10 | Stop reason: HOSPADM

## 2022-08-08 RX ORDER — ATORVASTATIN CALCIUM 40 MG/1
40 TABLET, FILM COATED ORAL DAILY
COMMUNITY

## 2022-08-08 RX ORDER — SODIUM CHLORIDE 0.9 % (FLUSH) 0.9 %
5-40 SYRINGE (ML) INJECTION EVERY 12 HOURS SCHEDULED
Status: DISCONTINUED | OUTPATIENT
Start: 2022-08-08 | End: 2022-08-10 | Stop reason: HOSPADM

## 2022-08-08 RX ORDER — SODIUM CHLORIDE 9 MG/ML
INJECTION, SOLUTION INTRAVENOUS PRN
Status: DISCONTINUED | OUTPATIENT
Start: 2022-08-08 | End: 2022-08-10 | Stop reason: HOSPADM

## 2022-08-08 RX ORDER — HEPARIN SODIUM 1000 [USP'U]/ML
4000 INJECTION, SOLUTION INTRAVENOUS; SUBCUTANEOUS ONCE
Status: DISCONTINUED | OUTPATIENT
Start: 2022-08-08 | End: 2022-08-09

## 2022-08-08 RX ADMIN — THIAMINE HYDROCHLORIDE 100 MG: 100 INJECTION, SOLUTION INTRAMUSCULAR; INTRAVENOUS at 18:36

## 2022-08-08 RX ADMIN — HYDRALAZINE HYDROCHLORIDE 25 MG: 25 TABLET, FILM COATED ORAL at 21:33

## 2022-08-08 RX ADMIN — FOLIC ACID 1 MG: 1 TABLET ORAL at 18:40

## 2022-08-08 RX ADMIN — SODIUM CHLORIDE: 9 INJECTION, SOLUTION INTRAVENOUS at 18:15

## 2022-08-08 RX ADMIN — DESMOPRESSIN ACETATE 40 MG: 0.2 TABLET ORAL at 21:33

## 2022-08-08 RX ADMIN — VANCOMYCIN HYDROCHLORIDE 1000 MG: 1 INJECTION, SOLUTION INTRAVENOUS at 15:03

## 2022-08-08 RX ADMIN — ASPIRIN 81 MG: 81 TABLET, COATED ORAL at 18:40

## 2022-08-08 RX ADMIN — PIPERACILLIN AND TAZOBACTAM 3375 MG: 3; .375 INJECTION, POWDER, FOR SOLUTION INTRAVENOUS at 14:08

## 2022-08-08 RX ADMIN — ACETAMINOPHEN 1000 MG: 500 TABLET ORAL at 12:50

## 2022-08-08 RX ADMIN — IOPAMIDOL 125 ML: 755 INJECTION, SOLUTION INTRAVENOUS at 16:29

## 2022-08-08 RX ADMIN — LIDOCAINE HYDROCHLORIDE 10 ML: 10 INJECTION, SOLUTION EPIDURAL; INFILTRATION; INTRACAUDAL; PERINEURAL at 15:05

## 2022-08-08 RX ADMIN — MORPHINE SULFATE 4 MG: 4 INJECTION INTRAVENOUS at 14:20

## 2022-08-08 RX ADMIN — VANCOMYCIN HYDROCHLORIDE 750 MG: 10 INJECTION, POWDER, LYOPHILIZED, FOR SOLUTION INTRAVENOUS at 22:43

## 2022-08-08 RX ADMIN — GABAPENTIN 300 MG: 300 CAPSULE ORAL at 21:33

## 2022-08-08 ASSESSMENT — PAIN DESCRIPTION - PAIN TYPE: TYPE: ACUTE PAIN

## 2022-08-08 ASSESSMENT — ENCOUNTER SYMPTOMS
EYE DISCHARGE: 0
NAUSEA: 0
CHEST TIGHTNESS: 0
DIARRHEA: 0
COUGH: 0
ABDOMINAL DISTENTION: 0
VOMITING: 0
RESPIRATORY NEGATIVE: 1
COLOR CHANGE: 1
SHORTNESS OF BREATH: 0
EYE PAIN: 0
CONSTIPATION: 0
ABDOMINAL PAIN: 0
GASTROINTESTINAL NEGATIVE: 1
APNEA: 0

## 2022-08-08 ASSESSMENT — PAIN SCALES - GENERAL: PAINLEVEL_OUTOF10: 8

## 2022-08-08 ASSESSMENT — PAIN DESCRIPTION - DESCRIPTORS: DESCRIPTORS: THROBBING;ACHING;PRESSURE

## 2022-08-08 ASSESSMENT — PAIN DESCRIPTION - ORIENTATION: ORIENTATION: RIGHT

## 2022-08-08 ASSESSMENT — PAIN DESCRIPTION - LOCATION: LOCATION: FOOT

## 2022-08-08 NOTE — ED NOTES
Podiatry in to see pt to cleanse the wound and remove the maggots.       Danielle Renee RN  08/08/22 9510

## 2022-08-08 NOTE — PROGRESS NOTES
Alondra Mom, DO   gabapentin (NEURONTIN) 300 MG capsule Take 1 capsule by mouth 3 times daily for 7 days. 6/27/22 7/4/22  Alondra Mom, DO   pantoprazole (PROTONIX) 40 MG tablet Take 1 tablet by mouth 2 times daily (before meals) 6/27/22   Alondra Mom, DO   lisinopril (PRINIVIL;ZESTRIL) 10 MG tablet Take 1 tablet by mouth daily 6/27/22   Alondra Mom, DO   hydroCHLOROthiazide (HYDRODIURIL) 25 MG tablet Take 1 tablet by mouth daily 6/27/22   Alondra Mom, DO   pravastatin (PRAVACHOL) 40 MG tablet Take 40 mg by mouth daily    Historical Provider, MD   Misc. Devices MISC Rx: Please dispense one Cane. 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 Difficulty ambulating  Sig: Use cane as needed to assist in ambulating. Patient not taking: Reported on 6/16/2022 11/9/15   Nestor Loya DPM   Misc. Devices MISC Please dispense a 30 day supply of the following:  4x4 gauze, Kerlix rolls, and paper tape. 11/9/15   Nestor Loya DPM   polyethylene glycol (GLYCOLAX) packet Take 17 g by mouth daily as needed. Patient not taking: Reported on 6/16/2022    Historical Provider, MD   Gauze Pads & Dressings ([facility]) MISC by Does not apply route. Patient not taking: Reported on 6/16/2022 2/26/13   Ron Christopher MD   metoprolol (LOPRESSOR) 25 MG tablet Take 25 mg by mouth 2 times daily. Historical Provider, MD   folic acid (FOLVITE) 1 MG tablet Take 1 mg by mouth daily. Historical Provider, MD    Scheduled Meds:   vancomycin  15 mg/kg IntraVENous Once    piperacillin-tazobactam  3,375 mg IntraVENous Once     Continuous Infusions:  PRN Meds:. Allergies  is allergic to shellfish-derived products. Family History  family history includes Cancer in his mother; Heart Disease in his father. Social History   reports that he has been smoking cigarettes. He has been smoking an average of 1 pack per day.  He has never used smokeless tobacco.   reports current alcohol use of about 12.0 standard drinks per week. reports no history of drug use. Objective     Vitals:  Patient Vitals for the past 8 hrs:   BP Temp Temp src Pulse Resp Weight   22 1323 -- -- -- -- -- 165 lb (74.8 kg)   22 1219 (!) 147/83 -- -- 75 19 --   22 1208 -- 98 °F (36.7 °C) Oral -- -- --     Average, Min, and Max for last 24 hours Vitals:  TEMPERATURE:  Temp  Av °F (36.7 °C)  Min: 98 °F (36.7 °C)  Max: 98 °F (36.7 °C)    RESPIRATIONS RANGE: Resp  Av  Min: 19  Max: 19    PULSE RANGE: Pulse  Av  Min: 75  Max: 75    BLOOD PRESSURE RANGE:  Systolic (92VTX), YHU:064 , Min:147 , IUY:348   ; Diastolic (73QII), UWT:97, Min:83, Max:83      PULSE OXIMETRY RANGE: No data recorded  I&O:  No intake/output data recorded. CBC:  Recent Labs     22  1300   WBC 8.3   HGB 14.0   HCT 40.1*      CRP 4.5        BMP:  Recent Labs     22  1300   *   K 3.9   CL 87*   CO2 23   BUN 2*   CREATININE 0.37*   GLUCOSE 89   CALCIUM 8.8        Coags:  Recent Labs     22  1300   APTT 31.1*   PROT 7.9   INR 0.9       No results found for: LABA1C  Lab Results   Component Value Date    SEDRATE 53 (H) 2022     Lab Results   Component Value Date    CRP 4.5 2022         Lower Extremity Physical Exam:  Vascular: DP and PT pulses are non-palpable. Audible per Doppler. CFT <3 seconds to all digits. Hair growth is absent to the level of the digits. Moderate edema. Neuro: Saph/sural/SP/DP/plantar sensation intact to light touch. Musculoskeletal: Muscle strength is 4/5 to all lower extremity muscle groups. Gross deformity is now noticeable clinically. Dermatologic: Full thickness ulcer #1 located distal aspect second digit and measures approximately 1 cm x by 1 cm x by 1.5 cm. Base is granular however full with maggots. Periwound skin is erythematous and slightly her hyperkeratotic maggots in the underlying dead tissue.   Serosanguineous and minor purulent drainage noted with significant associated mal odor. Erythema to the distal forefoot with associated increase in warmth. Does probe to bone and does undermine. No fluctuance, crepitus, or induration. There is a partial-thickness wound to the distal aspect of the dorsal forefoot just proximal to the levels of the metatarsophalangeal joint. Wound is a granular base with overlying thin atrophic treated tissue with maggots between the granular and overlying tissue. The wound is 10 cm x 4.5 cm by superficial.  Significant malodor    Interdigital maceration present with maggots infestation. Clinical:                 Imaging:   XR FOOT RIGHT (MIN 3 VIEWS)   Final Result   1. Soft tissue irregularity involving the skin between the 3rd and 4th toes   without convincing evidence for distant soft tissue gas. No underlying bone   loss identified. 2.  Interval amputation of the distal 2nd toe. Unchanged appearance of   distal great toe amputation. Cultures: Not taken    Assessment     Clare Santiago is a 59 y.o. male with   Maggot infestation, right foot  Cellulitis right foot  Peripheral arterial disease    Active Problems:    * No active hospital problems. *  Resolved Problems:    * No resolved hospital problems. *        Plan     Patient examined and evaluated at bedside   Treatment options discussed in detail with the patient  Radiographs reviewed and discussed in detail with patient. Plain film radiographs show no evidence of cortical lysis to the distal aspect of the digits that would suggest osteomyelitis. No evidence of soft tissue gas. No evidence of soft tissue gas that tracks proximally. Bedside washout performed with 1 L of saline and peroxide for removal of all maggot infestation within the interspaces. Patient underwent fairly significant pain and therefore it was decided that a local block of 1% lidocaine was performed.   10 cc were injected in a ring block fashion to the forefoot of the right foot.  Dressing applied to Right foot: Betadine soaked 4 x 4, DSD with gauze, Kerlix, Ace  Patient admitted for IV antibiotics due to cellulitis and maggot infestation  Vancomycin and Zosyn, appreciate ID recommendations  Patient instructed to remain nonweightbearing to the right lower extremity  Will discuss with Dr. Sharonda Quezada DPM   Podiatric Medicine & Surgery   8/8/2022 at 1:59 PM     Senior Resident Statement:  I have discussed the case, including pertinent history and exam findings with the resident. I agree with the assessment, plan and orders as documented by the intern. Any changes were made in the note above.        Electronically signed by Dania Wesley DPM on 08/08/22 at 9:09 AM

## 2022-08-08 NOTE — CONSULTS
[facility] -   Infectious diseases evaluation  admission date 8/8/2022    reason for consultation:   R foot diab infection    Impression :   Current:  R diab foot infection w maggots  R foot cellulitis  Periph vasc disease both legs - claudication    Other:    Discussion / summary of stay / plan of care     Recommendations   Ok for vanco and zosyn for now  Get MRI R foot and decide for AB  Pend CTA angiogram for aortic runoff -   Will follow -  Wound ca    Infection Control Recommendations   [place] Precautions  Contact Isolation       Antimicrobial Stewardship Recommendations   Simplification of therapy  Targeted therapy      History of Present Illness:   Initial history:  Jaycee Almanza is a 59y.o.-year-old male w pain R foot and presented after not feeling good and R foot noticed w maggots. Tender R foot and small crack between th toes but no deep drainage - foot very tender to touch in the forefoot area. R foot is a little edematous. Does not walk enough due to pain in calves  Ox 3 and gave hx  Past issues w left foot and post TMA  R foot 2nd toe past tip resection and still is open and not well healed. Interval changes  8/8/2022   Patient Vitals for the past 8 hrs:   BP Temp Temp src Pulse Resp Weight   08/08/22 1701 121/78 -- -- 58 14 --   08/08/22 1602 103/69 -- -- 60 14 --   08/08/22 1501 117/74 -- -- 64 17 --   08/08/22 1402 (!) 141/83 -- -- 64 13 --   08/08/22 1347 (!) 161/85 -- -- 67 19 --   08/08/22 1323 -- -- -- -- -- 165 lb (74.8 kg)   08/08/22 1219 (!) 147/83 -- -- 75 19 --   08/08/22 1208 -- 98 °F (36.7 °C) Oral -- -- --       Summary of relevant labs:  Labs:   W 8  Creat 0.37  Liver enz normal    Micro:  BC 8/8 pend    Imaging:  CTAP aortic run off pend  MRI R voot pend      I have personally reviewed the past medical history, past surgical history, medications, social history, and family history, and I haveupdated the database accordingly. Allergies:   Shellfish-derived products     Review of Systems:     Review of Systems   Constitutional:  Positive for activity change (claudication). HENT:  Negative for congestion. Eyes:  Negative for discharge. Respiratory:  Negative for apnea. Cardiovascular:  Negative for chest pain. Gastrointestinal:  Negative for abdominal pain. Endocrine: Negative for cold intolerance and polydipsia. Genitourinary:  Negative for dysuria. Musculoskeletal:  Negative for arthralgias. Skin:  Positive for color change and wound. Allergic/Immunologic: Negative for immunocompromised state. Neurological:  Negative for dizziness. Hematological:  Negative for adenopathy. Psychiatric/Behavioral:  Negative for agitation. Physical Examination :       Physical Exam  Constitutional:       Appearance: Normal appearance. He is not ill-appearing. HENT:      Head: Normocephalic and atraumatic. Nose: Nose normal.      Mouth/Throat:      Mouth: Mucous membranes are moist.   Eyes:      General: No scleral icterus. Conjunctiva/sclera: Conjunctivae normal.   Cardiovascular:      Rate and Rhythm: Normal rate and regular rhythm. Heart sounds: Normal heart sounds. No murmur heard. Pulmonary:      Effort: No respiratory distress. Breath sounds: Normal breath sounds. Abdominal:      General: There is no distension. Palpations: Abdomen is soft. Tenderness: There is no abdominal tenderness. Genitourinary:     Comments: No esparza  Musculoskeletal:         General: No swelling or deformity. Cervical back: Neck supple. No rigidity. Right lower leg: Edema present. Skin:     Coloration: Skin is not jaundiced. Findings: Erythema (R foot) present. No bruising. Neurological:      Mental Status: He is alert and oriented to person, place, and time. Cranial Nerves: No cranial nerve deficit.    Psychiatric:         Mood and Affect: Mood normal.         Thought Content: Thought content normal.       Past Medical History:     Past Medical History:   Diagnosis Date    Alcohol abuse     CAD (coronary artery disease)     Convulsions ([address]     Frostbite     Hypertension        Past Surgical  History:     Past Surgical History:   Procedure Laterality Date    ABDOMINAL AORTIC ANEURYSM REPAIR Bilateral 6/23/2022    AX-FEM, FEM-FEM BYPASS, X2 GORE PROPATEN GRAFTS 8MM X 80CM, X2 0.8CM X 8CM VASCUGUARD PATCH ** CELL SAVER** performed by Mellissa Sheehan MD at [address] [place] [address] Rd      left foot       Medications:      heparin (porcine)  4,000 Units IntraVENous Once       Social History:     Social History     Socioeconomic History    Marital status:      Spouse name: Not on file    Number of children: Not on file    Years of education: Not on file    Highest education level: Not on file   Occupational History    Not on file   Tobacco Use    Smoking status: Every Day     Packs/day: 1.00     Types: Cigarettes    Smokeless tobacco: Never   Vaping Use    Vaping Use: Not on file   Substance and Sexual Activity    Alcohol use:  Yes     Alcohol/week: 12.0 standard drinks     Types: 12 Cans of beer per week     Comment: approx 12 pack daily    Drug use: No    Sexual activity: Not on file   Other Topics Concern    Not on file   Social History Narrative    Not on file     Social Determinants of Health     Financial Resource Strain: Not on file   Food Insecurity: Not on file   Transportation Needs: Not on file   Physical Activity: Not on file   Stress: Not on file   Social Connections: Not on file   Intimate Partner Violence: Not on file   Housing Stability: Not on file       Family History:     Family History   Problem Relation Age of Onset    Cancer Mother     Heart Disease Father       Medical Decision Making:   I have independently reviewed/ordered the following labs:    CBC with Differential:   Recent Labs     08/08/22  1300   WBC 8.3   HGB 14.0   HCT 40.1*    LYMPHOPCT 19*   MONOPCT 6     BMP:  Recent Labs     08/08/22  1300   *   K 3.9   CL 87*   CO2 23   BUN 2*   CREATININE 0.37*     Hepatic Function Panel:   Recent Labs     08/08/22  1300   PROT 7.9   LABALBU 4.1   BILITOT 0.25*   ALKPHOS 104   ALT 8   AST 26     No results for input(s): RPR in the last 72 hours. No results for input(s): HIV in the last 72 hours. No results for input(s): BC in the last 72 hours. Lab Results   Component Value Date/Time    CREATININE 0.37 08/08/2022 01:00 PM    GLUCOSE 89 08/08/2022 01:00 PM    GLUCOSE 85 05/28/2012 05:07 AM       Detailed results: Thank you for allowing us to participate in the care of this patient. Please call with questions. This note is created with the assistance of a speech recognition program.  While intending to generate adocument that actually reflects the content of the visit, the document can still have some errors including those of syntax and sound a like substitutions which may escape proof reading. It such instances, actual meaningcan be extrapolated by contextual diversion.     Gael Henderson MD  Office: [phone]  Perfect serve / office [phone]

## 2022-08-08 NOTE — ED PROVIDER NOTES
SOCIAL / FAMILY HISTORY      has a past medical history of Alcohol abuse, CAD (coronary artery disease), Convulsions ([address] Frostbite, and Hypertension. has a past surgical history that includes Toe amputation and Abdominal aortic aneurysm repair (Bilateral, 6/23/2022). Social History     Socioeconomic History    Marital status:      Spouse name: Not on file    Number of children: Not on file    Years of education: Not on file    Highest education level: Not on file   Occupational History    Not on file   Tobacco Use    Smoking status: Every Day     Packs/day: 1.00     Types: Cigarettes    Smokeless tobacco: Never   Vaping Use    Vaping Use: Not on file   Substance and Sexual Activity    Alcohol use: Yes     Alcohol/week: 12.0 standard drinks     Types: 12 Cans of beer per week     Comment: approx 12 pack daily    Drug use: No    Sexual activity: Not on file   Other Topics Concern    Not on file   Social History Narrative    Not on file     Social Determinants of Health     Financial Resource Strain: Not on file   Food Insecurity: Not on file   Transportation Needs: Not on file   Physical Activity: Not on file   Stress: Not on file   Social Connections: Not on file   Intimate Partner Violence: Not on file   Housing Stability: Not on file       Family History   Problem Relation Age of Onset    Cancer Mother     Heart Disease Father        Allergies:  Shellfish-derived products    Home Medications:  Prior to Admission medications    Medication Sig Start Date End Date Taking? Authorizing Provider   atorvastatin (LIPITOR) 40 MG tablet Take 40 mg by mouth in the morning. Yes Historical Provider, MD   sertraline (ZOLOFT) 50 MG tablet Take 50 mg by mouth in the morning.    Yes Historical Provider, MD   rivaroxaban (XARELTO) 20 MG TABS tablet Take 1 tablet by mouth daily (with breakfast) 6/28/22   Graham Espinoza DO   acetaminophen (TYLENOL) 500 MG tablet Take 2 tablets by mouth every 8 hours for 7 days 6/27/22 7/4/22  Wilhemenia Buffalo, DO   aspirin 81 MG EC tablet Take 1 tablet by mouth daily 6/27/22   Wilhemenia Buffalo, DO   gabapentin (NEURONTIN) 300 MG capsule Take 1 capsule by mouth 3 times daily for 7 days. Patient taking differently: Take 300 mg by mouth in the morning and at bedtime. 6/27/22 7/4/22  Wilhemenia Buffalo, DO   pantoprazole (PROTONIX) 40 MG tablet Take 1 tablet by mouth 2 times daily (before meals) 6/27/22   Wilhemenia Buffalo, DO   lisinopril (PRINIVIL;ZESTRIL) 10 MG tablet Take 1 tablet by mouth daily 6/27/22   Wilhemenia Buffalo, DO   hydroCHLOROthiazide (HYDRODIURIL) 25 MG tablet Take 1 tablet by mouth daily  Patient taking differently: Take 12.5 mg by mouth in the morning. 6/27/22   Wilhemenia Buffalo, DO   Misc. Devices MISC Rx: Please dispense one Cane. 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 Difficulty ambulating  Sig: Use cane as needed to assist in ambulating. Patient not taking: Reported on 6/16/2022 11/9/15   Xochitl Olivares DPM   Misc. Devices MISC Please dispense a 30 day supply of the following:  4x4 gauze, Kerlix rolls, and paper tape. 11/9/15   Xochitl Olivares DPM   metoprolol (LOPRESSOR) 25 MG tablet Take 25 mg by mouth 2 times daily. Historical Provider, MD   folic acid (FOLVITE) 1 MG tablet Take 1 mg by mouth daily. Historical Provider, MD       REVIEW OF SYSTEMS    (2-9 systems for level 4, 10 or more for level 5)      Review of Systems   Constitutional:  Negative for activity change, appetite change, chills and fever. Respiratory:  Negative for cough and shortness of breath. Cardiovascular:  Negative for chest pain. Gastrointestinal:  Negative for abdominal distention, abdominal pain, constipation, diarrhea, nausea and vomiting. Musculoskeletal:  Positive for gait problem. Skin:  Positive for wound. Negative for pallor. Neurological:  Negative for dizziness, seizures, syncope, weakness, light-headedness, numbness and headaches.    Psychiatric/Behavioral:  Negative for confusion. PHYSICAL EXAM   (up to 7 for level 4, 8 or more for level 5)      INITIAL VITALS:   BP (!) 161/82   Pulse 63   Temp 97.2 °F (36.2 °C) (Axillary)   Resp 16   Wt 165 lb (74.8 kg)   SpO2 96%   BMI 25.09 kg/m²     Physical Exam  Constitutional:       General: He is awake. Appearance: Normal appearance. HENT:      Head: Normocephalic and atraumatic. Right Ear: External ear normal.      Left Ear: External ear normal.      Nose: Nose normal.   Eyes:      General: Lids are normal.      Extraocular Movements: Extraocular movements intact. Cardiovascular:      Rate and Rhythm: Normal rate. Pulses: Normal pulses. Heart sounds: Normal heart sounds. Pulmonary:      Effort: Pulmonary effort is normal.      Breath sounds: Normal breath sounds. Abdominal:      Comments: Sutures and staples still in place. Wounds well-healed no obvious signs of infections to the wounds. Musculoskeletal:      Cervical back: Normal range of motion. Comments: Open wound to the first and second digit of the right foot, maggots crawling throughout the foot. Sensation intact, pulses intact. Obvious area of erythema and discoloration. Neurological:      Mental Status: He is alert and oriented to person, place, and time. Sensory: Sensation is intact. Motor: Motor function is intact. Psychiatric:         Mood and Affect: Mood normal.         Behavior: Behavior is cooperative.        DIFFERENTIAL  DIAGNOSIS     PLAN (LABS / IMAGING / EKG):  Orders Placed This Encounter   Procedures    Blood Culture 1    Blood Culture 2    XR FOOT RIGHT (MIN 3 VIEWS)    MRI FOOT RIGHT W WO CONTRAST    CBC with Auto Differential    CMP    Sedimentation Rate    C-Reactive Protein    Protime-INR    APTT    Urinalysis with Microscopic    Lactic Acid    APTT    Inpatient consult to Podiatry    Inpatient consult to Vascular Surgery    Inpatient consult to Internal Medicine    Inpatient consult to Infectious 30.6 25.2 - 33.5 pg    MCHC 34.9 (H) 28.4 - 34.8 g/dL    RDW 15.6 (H) 11.8 - 14.4 %    Platelets 862 422 - 324 k/uL    MPV 8.7 8.1 - 13.5 fL    NRBC Automated 0.0 0.0 per 100 WBC    Seg Neutrophils 72 (H) 36 - 65 %    Lymphocytes 19 (L) 24 - 43 %    Monocytes 6 3 - 12 %    Eosinophils % 2 1 - 4 %    Basophils 1 0 - 2 %    Immature Granulocytes 0 0 %    Segs Absolute 6.06 1.50 - 8.10 k/uL    Absolute Lymph # 1.62 1.10 - 3.70 k/uL    Absolute Mono # 0.46 0.10 - 1.20 k/uL    Absolute Eos # 0.13 0.00 - 0.44 k/uL    Basophils Absolute 0.04 0.00 - 0.20 k/uL    Absolute Immature Granulocyte 0.03 0.00 - 0.30 k/uL    RBC Morphology ANISOCYTOSIS PRESENT    CMP   Result Value Ref Range    Glucose 89 70 - 99 mg/dL    BUN 2 (L) 6 - 20 mg/dL    Creatinine 0.37 (L) 0.70 - 1.20 mg/dL    Calcium 8.8 8.6 - 10.4 mg/dL    Sodium 126 (L) 135 - 144 mmol/L    Potassium 3.9 3.7 - 5.3 mmol/L    Chloride 87 (L) 98 - 107 mmol/L    CO2 23 20 - 31 mmol/L    Anion Gap 16 9 - 17 mmol/L    Alkaline Phosphatase 104 40 - 129 U/L    ALT 8 5 - 41 U/L    AST 26 <40 U/L    Total Bilirubin 0.25 (L) 0.3 - 1.2 mg/dL    Total Protein 7.9 6.4 - 8.3 g/dL    Albumin 4.1 3.5 - 5.2 g/dL    Albumin/Globulin Ratio 1.1 1.0 - 2.5    GFR Non-African American >60 >60 mL/min    GFR African American >60 >60 mL/min    GFR Comment         Sedimentation Rate   Result Value Ref Range    Sed Rate 53 (H) 0 - 20 mm/Hr   C-Reactive Protein   Result Value Ref Range    CRP 4.5 0.0 - 5.0 mg/L   Protime-INR   Result Value Ref Range    Protime 10.2 9.1 - 12.3 sec    INR 0.9    APTT   Result Value Ref Range    PTT 31.1 (H) 20.5 - 30.5 sec   Urinalysis with Microscopic   Result Value Ref Range    Color, UA Yellow Yellow    Turbidity UA Clear Clear    Glucose, Ur NEGATIVE NEGATIVE    Bilirubin Urine NEGATIVE NEGATIVE    Ketones, Urine NEGATIVE NEGATIVE    Specific Gravity, UA 1.006 1.005 - 1.030    Urine Hgb NEGATIVE NEGATIVE    pH, UA 6.0 5.0 - 8.0    Protein, UA NEGATIVE NEGATIVE Urobilinogen, Urine Normal Normal    Nitrite, Urine NEGATIVE NEGATIVE    Leukocyte Esterase, Urine NEGATIVE NEGATIVE    WBC, UA 0 TO 2 0 - 5 /HPF    RBC, UA 0 TO 2 0 - 2 /HPF    Epithelial Cells UA 0 TO 2 0 - 5 /HPF   Lactic Acid   Result Value Ref Range    Lactic Acid, Whole Blood 2.2 (H) 0.7 - 2.1 mmol/L           RADIOLOGY:  XR FOOT RIGHT (MIN 3 VIEWS)   Final Result   1. Soft tissue irregularity involving the skin between the 3rd and 4th toes   without convincing evidence for distant soft tissue gas. No underlying bone   loss identified. 2.  Interval amputation of the distal 2nd toe. Unchanged appearance of   distal great toe amputation. MRI FOOT RIGHT W WO CONTRAST    (Results Pending)      EMERGENCY DEPARTMENT COURSE:  ED Course as of 08/08/22 1810   Mon Aug 08, 2022   1327 Podiatry on their way to eval patient [SS]   1415 Podiatry to wash out wound, will admit for IV abx and MRI  [SS]   1435 IMPRESSION:  1. Soft tissue irregularity involving the skin between the 3rd and 4th toes  without convincing evidence for distant soft tissue gas. No underlying bone  loss identified. 2.  Interval amputation of the distal 2nd toe. Unchanged appearance of  distal great toe amputation. [SS]   1546 Sodium(!): 126  Medicine team informed will order correction  [SS]      ED Course User Index  [SS] Pacheco Hooper MD        PROCEDURES:  None    CONSULTS:  IP CONSULT TO PODIATRY  IP CONSULT TO VASCULAR SURGERY  IP CONSULT TO INTERNAL MEDICINE  IP CONSULT TO INFECTIOUS DISEASES  IP CONSULT TO CASE MANAGEMENT  PHARMACY TO DOSE VANCOMYCIN    CRITICAL CARE:  None    FINAL IMPRESSION      1. Infected wound          DISPOSITION / PLAN     DISPOSITION Admitted 08/08/2022 03:57:10 PM      PATIENT REFERRED TO:  No follow-up provider specified.     DISCHARGE MEDICATIONS:  Current Discharge Medication List          Pacheco Hooper MD  Emergency Medicine Resident    (Please note that portions of thisnote were completed with a voice recognition program.  Efforts were made to edit the dictations but occasionally words are mis-transcribed.)        Lorinda Shone, MD  Resident  08/08/22 [id number]

## 2022-08-08 NOTE — ED NOTES
Patient here per squad, arrives with right foot in dressing, states has maggots eating at right foot. Patient states \"had 1st and 2nd toe of right foot partially removed and now the maggots have come\"  Patient states having pain to right foot, states better when dangling down. Patient requesting tylenol for foot pain.      Gabriel Spence RN  08/08/22 [id number]

## 2022-08-08 NOTE — ED PROVIDER NOTES
[address] [facility]     Emergency Department     Faculty Attestation    I performed a history and physical examination of the patient and discussed management with the resident. I reviewed the resident´s note and agree with the documented findings and plan of care. Any areas of disagreement are noted on the chart. I was personally present for the key portions of any procedures. I have documented in the chart those procedures where I was not present during the key portions. I have reviewed the emergency nurses triage note. I agree with the chief complaint, past medical history, past surgical history, allergies, medications, social and family history as documented unless otherwise noted below. For Physician Assistant/ Nurse Practitioner cases/documentation I have personally evaluated this patient and have completed at least one if not all key elements of the E/M (history, physical exam, and MDM). Additional findings are as noted. Cellulitis right foot and leg.        Charise Holstein, MD  08/08/22 [id number]

## 2022-08-08 NOTE — PROGRESS NOTES
[address] [facility] Pharmacokinetic Monitoring Service - Vancomycin     King Medina is a 59 y.o. male starting on vancomycin therapy for skin and soft tissue. Pharmacy consulted by Sara Martinez for monitoring and adjustment. Target Concentration: Goal trough of 10-15 mg/L and AUC/LULA <500 mg*hr/L    Additional Antimicrobials:     Pertinent Laboratory Values: Wt Readings from Last 1 Encounters:   08/08/22 144 lb 6.4 oz (65.5 kg)     Temp Readings from Last 1 Encounters:   08/08/22 97.2 °F (36.2 °C) (Axillary)     Estimated Creatinine Clearance: 199 mL/min (A) (based on SCr of 0.37 mg/dL (L)). Recent Labs     08/08/22  1300   CREATININE 0.37*   WBC 8.3     Procalcitonin:     Pertinent Cultures:  Culture Date Source Results        MRSA Nasal Swab: N/A. Non-respiratory infection.     Plan:  Dosing recommendations based on Bayesian software  Start vancomycin 750mg q8h  Anticipated AUC of 415 and trough concentration of 12.7 at steady state  Renal labs as indicated   Pharmacy will continue to monitor patient and adjust therapy as indicated    Thank you for the consult,  Charlie Cage, [facility]  8/8/2022 6:36 PM

## 2022-08-08 NOTE — CONSULTS
Division of Vascular Surgery        New Consult      Physician Requesting Consult:  Dr Faye Flynn    Reason for Consult:   Right leg pain and right leg maggots    Chief Complaint:   Right leg pain and right leg maggots    History of Present Illness:      Mata Echevarria is a 59 y.o. gentleman with hx of leg toes amputation, right toes partially removed, reeqjnug-es-npylvre bypass 06/2022 presents for right leg pain and right lower leg maggots. Patient has had surgery with Dr Peter Taylor on the 6/23/2022 for aortoiliac occlusion with est pain and ulceration with the procedures of cvksykye-ht-cbhptqs bypass with multiple endarterectomy procedures list below. Patient was discharged on the 06/28/2022 with good condition. Per patient, his right 2nd toes starts to die off a month ago, and maggots are showing up afterward. Patient has been using iodine and hydrogen peroxide to submerge his right foot to eliminate the maggots, but without much success. Patient admits of having constant resting pain on right foot, and increases intensity of foot during ambulation. H is a smoker since 10y/o one pack per day. When asked if he's taking blood thinner, patient admits Plavix prescription ran out couple weeks ago, and he did not take the Xarelto that was prescribed from the last hospitalization. He only takes tylenol for his pain. During today's exam, maggots live in between of his 1-2nd, 3-4th and 4-5th toes, and inside the 2nd toe. Patients denies of fever, chills, chest pain or SOB.        Medical History:     Past Medical History:   Diagnosis Date    Alcohol abuse     CAD (coronary artery disease)     Convulsions ([address]     Frostbite     Hypertension        Surgical History:     Past Surgical History:   Procedure Laterality Date    ABDOMINAL AORTIC ANEURYSM REPAIR Bilateral 6/23/2022    AX-FEM, FEM-FEM BYPASS, X2 GORE PROPATEN GRAFTS 8MM X 80CM, X2 0.8CM X 8CM VASCUGUARD PATCH ** CELL SAVER** performed by Helena Nguyen MD at [address]      left foot       Family History:     Family History   Problem Relation Age of Onset    Cancer Mother     Heart Disease Father        Allergies:       Shellfish-derived products    Medications:      Current Facility-Administered Medications   Medication Dose Route Frequency Provider Last Rate Last Admin    heparin (porcine) injection 4,000 Units  4,000 Units IntraVENous Once Yunier Lamar MD        heparin (porcine) injection 4,000 Units  4,000 Units IntraVENous PRN Celio Green MD        heparin (porcine) injection 2,000 Units  2,000 Units IntraVENous PRN Celio Green MD        heparin 25,000 units in dextrose 5 % 250 mL infusion (rate based)  5-30 Units/kg/hr IntraVENous Continuous Celio Green MD         Current Outpatient Medications   Medication Sig Dispense Refill    rivaroxaban (XARELTO) 20 MG TABS tablet Take 1 tablet by mouth daily (with breakfast) 90 tablet 0    acetaminophen (TYLENOL) 500 MG tablet Take 2 tablets by mouth every 8 hours for 7 days 42 tablet 0    aspirin 81 MG EC tablet Take 1 tablet by mouth daily 30 tablet 3    gabapentin (NEURONTIN) 300 MG capsule Take 1 capsule by mouth 3 times daily for 7 days. 21 capsule 0    pantoprazole (PROTONIX) 40 MG tablet Take 1 tablet by mouth 2 times daily (before meals) 30 tablet 3    lisinopril (PRINIVIL;ZESTRIL) 10 MG tablet Take 1 tablet by mouth daily 30 tablet 3    hydroCHLOROthiazide (HYDRODIURIL) 25 MG tablet Take 1 tablet by mouth daily 30 tablet 3    pravastatin (PRAVACHOL) 40 MG tablet Take 40 mg by mouth daily      Misc. Devices MISC Rx: Please dispense one Cane. 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 Difficulty ambulating  Sig: Use cane as needed to assist in ambulating. (Patient not taking: Reported on 6/16/2022) 1 Device 0    Misc. Devices MISC Please dispense a 30 day supply of the following:  4x4 gauze, Kerlix rolls, and paper tape.  1 Device 0    polyethylene glycol (GLYCOLAX) packet Take 17 g by mouth daily as needed. (Patient not taking: Reported on 6/16/2022)      Gauze Pads & Dressings ([facility]) MISC by Does not apply route. (Patient not taking: Reported on 6/16/2022) 14 each 1    metoprolol (LOPRESSOR) 25 MG tablet Take 25 mg by mouth 2 times daily. folic acid (FOLVITE) 1 MG tablet Take 1 mg by mouth daily. Social History:     Tobacco:    reports that he has been smoking cigarettes. He has been smoking an average of 1 pack per day. He has never used smokeless tobacco.  Alcohol:      reports current alcohol use of about 12.0 standard drinks per week. Drug Use:  reports no history of drug use. Review of Systems:     Review of Systems   Constitutional:  Positive for fatigue. Negative for activity change, chills and fever. HENT:  Negative for ear discharge, ear pain and hearing loss. Eyes:  Negative for pain. Respiratory:  Negative for shortness of breath. Cardiovascular:  Negative for chest pain. Gastrointestinal:  Negative for abdominal distention and abdominal pain. Genitourinary:  Negative for difficulty urinating. Musculoskeletal:  Positive for arthralgias and joint swelling. Skin:  Positive for color change and wound. Allergic/Immunologic: Negative for environmental allergies. Neurological:  Negative for dizziness and headaches. Hematological:  Negative for adenopathy. Psychiatric/Behavioral:  Negative for agitation. Physical Exam:     Vitals:  /69   Pulse 60   Temp 98 °F (36.7 °C) (Oral)   Resp 14   Wt 165 lb (74.8 kg)   BMI 25.09 kg/m²     Physical Exam  HENT:      Head: Normocephalic. Right Ear: External ear normal.      Left Ear: External ear normal.      Nose: Nose normal.      Mouth/Throat:      Mouth: Mucous membranes are moist.   Eyes:      Pupils: Pupils are equal, round, and reactive to light. Cardiovascular:      Rate and Rhythm: Normal rate and regular rhythm.    Pulmonary:      Effort: Pulmonary effort is normal.      Breath sounds: Normal breath sounds. Abdominal:      General: Abdomen is flat. Palpations: Abdomen is soft. Musculoskeletal:         General: Normal range of motion. Cervical back: Normal range of motion. Comments: Left leg all toes are amputated, multiple dry red scabs throughout the LLE. Erythematous, edematous and swollen RLE. 1st and 2nd right toes are partially removed. 2nd right toe oozing with blood and mucus with open fresh wound. Maggots located at the gap between 1-2nd, 3-4th and 4-5th toes, and inside the 2nd toe   Skin:     Capillary Refill: Capillary refill takes 2 to 3 seconds. Findings: Erythema, lesion and rash present. Neurological:      Mental Status: He is alert and oriented to person, place, and time. Psychiatric:         Mood and Affect: Mood normal.         Behavior: Behavior normal.                             Imaging/Labs:   XR FOOT RIGHT (MIN 3 VIEWS)    Result Date: 8/8/2022  1. Soft tissue irregularity involving the skin between the 3rd and 4th toes without convincing evidence for distant soft tissue gas. No underlying bone loss identified. 2.  Interval amputation of the distal 2nd toe. Unchanged appearance of distal great toe amputation. Assessment and Plan:   59 y.o. man with hx of leg toes amputation, right toes partially removed, vmjcoxhc-gt-onadbhd bypass 06/2022 presents for right leg pain and right lower leg maggots. Procedure in 06/2022:  1. Bilateral common femoral endarterectomy with bovine pericardial patch angioplasty. 2.  Right profunda endarterectomy with bovine pericardial patch angioplasty. 3.  Right axillary to right femoral bypass with 8 mm ringed PTFE. 4.  Right common femoral to left common femoral bypass with 8 mm ringed PTFE. 5.  Mini exploratory laparotomy to investigate the proximal infrarenal aorta.     Plan:  -discussed patient, history and physical with attending  -CTA of chest/ab/pelvic round-off  -Podiatry is consulted  -ID is consulted    Electronically signed by Christa Curtis DO on 8/8/22 at 4:09 PM EDT      [address]

## 2022-08-08 NOTE — PROGRESS NOTES
[facility]    Admission Medication Reconciliation       The patient's list of current home medications has been reviewed. Source(s) of information: Fill history;patient    Based on information provided by the above source(s), I have updated the patient's home med list as described below. Please review the ACTION REQUESTED section of this note below for any discrepancies on current hospital orders. I changed or updated the following medications on the patient's home medication list:  Removed Pravastatin      Added Atorvastatin 40 mg once daily   Sertraline 50 mg once daily      Adjusted   Gabapentin 300 mg TID changed to 300mg BID  Hydrochlorothiazide 25 mg once changed to 12.5 mg once daily   Other Notes   Patient was unsure of what medications he takes at home; believes he takes 6 or 7 of them. Patient stated he takes Plavix but no fill hx seen  Also, no fill hx for metoprolol 25 mg BID or protonix 40 mg BID       Please feel free to call me with any questions about this encounter. Thank you.     Thank you  Baltazar Lakhani, PharmD  8/8/2022 6:04 PM

## 2022-08-08 NOTE — H&P
[facility]     Department of Internal Medicine - Staff Internal Medicine Teaching Service          ADMISSION NOTE/HISTORY AND PHYSICAL EXAMINATION   Date: 8/8/2022  Patient Name: Rajesh Russo  Date of admission: 8/8/2022 12:05 PM  YOB: 1965  PCP: No primary care provider on file. History Obtained From:  patient, electronic medical record    [facility]     Chief complaint:   Chief Complaint   Patient presents with    Foot Pain     Right foot, patient states he has maggots eating away at foot, states had 1/2 of first and second toe amputated       HISTORY OF PRESENTING ILLNESS     Rajesh Russo is a 59 y.o. male who presents with maggots in the open wound on his right foot. Patient states that he has a history of his toes turning black, and the last month his toes turn black and 1 fell off. States that he soaks his foot and iodine daily and has recently noticed maggots growing on it. Patient is an alcoholic and states that he got turned around and never followed up with any of his doctors appointments. He also reports not having followed up with his vascular surgeon after femoral bypass over a month ago. Patient still has sutures in place. Denies any fevers, chills, nausea, vomiting. Notes that the pain was significantly worse over the last 2 days that he needs to call EMS. Review of Systems   Constitutional:  Positive for activity change. Negative for chills and fever. Eyes:  Negative for visual disturbance. Respiratory:  Negative for cough, chest tightness and shortness of breath. Gastrointestinal:  Negative for abdominal distention and abdominal pain. Genitourinary:  Negative for dysuria. Musculoskeletal:         Patient has cramps when walking long distances, patient's right lower extremity has distal amputations, as well as maggots in the wound.        PAST MEDICAL HISTORY     Past Medical History:   Diagnosis Date    Alcohol abuse CAD (coronary artery disease)     Convulsions ([facility])     Frostbite     Hypertension        PAST SURGICAL HISTORY     Past Surgical History:   Procedure Laterality Date    ABDOMINAL AORTIC ANEURYSM REPAIR Bilateral 6/23/2022    AX-FEM, FEM-FEM BYPASS, X2 GORE PROPATEN GRAFTS 8MM X 80CM, X2 0.8CM X 8CM VASCUGUARD PATCH ** CELL SAVER** performed by Prudence Howe MD at [address] [place] [address]      left foot       ALLERGIES     Shellfish-derived products    MEDICATIONS PRIOR TO ADMISSION     Prior to Admission medications    Medication Sig Start Date End Date Taking? Authorizing Provider   rivaroxaban (XARELTO) 20 MG TABS tablet Take 1 tablet by mouth daily (with breakfast) 6/28/22   Kathy Mendoza,    acetaminophen (TYLENOL) 500 MG tablet Take 2 tablets by mouth every 8 hours for 7 days 6/27/22 7/4/22  Kathy Mendoza DO   aspirin 81 MG EC tablet Take 1 tablet by mouth daily 6/27/22   Kathy Mendoza DO   gabapentin (NEURONTIN) 300 MG capsule Take 1 capsule by mouth 3 times daily for 7 days. 6/27/22 7/4/22  Kathy Mendoza DO   pantoprazole (PROTONIX) 40 MG tablet Take 1 tablet by mouth 2 times daily (before meals) 6/27/22   Kathy Mendoza DO   lisinopril (PRINIVIL;ZESTRIL) 10 MG tablet Take 1 tablet by mouth daily 6/27/22   Kathy Mendoza DO   hydroCHLOROthiazide (HYDRODIURIL) 25 MG tablet Take 1 tablet by mouth daily 6/27/22   Kathy Mendoza DO   pravastatin (PRAVACHOL) 40 MG tablet Take 40 mg by mouth daily    Historical Provider, MD   Misc. Devices MISC Rx: Please dispense one Cane. 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 Difficulty ambulating  Sig: Use cane as needed to assist in ambulating. Patient not taking: Reported on 6/16/2022 11/9/15   Eli Johnson DPM   [place]. Devices MISC Please dispense a 30 day supply of the following:  4x4 gauze, Kerlix rolls, and paper tape. 11/9/15   Eli Johnson DPM   polyethylene glycol (GLYCOLAX) packet Take 17 g by mouth daily as needed.   Patient not Neurological:      Mental Status: He is alert and oriented to person, place, and time. INVESTIGATIONS     Laboratory Testing:     Recent Results (from the past 24 hour(s))   Blood Culture 1    Collection Time: 08/08/22 12:44 PM    Specimen: Blood   Result Value Ref Range    Specimen Description . BLOOD     Special Requests RT FA 10 ML     Culture NO GROWTH <24 HRS    Blood Culture 2    Collection Time: 08/08/22  1:00 PM    Specimen: Blood   Result Value Ref Range    Specimen Description . BLOOD     Special Requests LEFT FA 20 ML     Culture NO GROWTH <24 HRS    CBC with Auto Differential    Collection Time: 08/08/22  1:00 PM   Result Value Ref Range    WBC 8.3 3.5 - 11.3 k/uL    RBC 4.58 4.21 - 5.77 m/uL    Hemoglobin 14.0 13.0 - 17.0 g/dL    Hematocrit 40.1 (L) 40.7 - 50.3 %    MCV 87.6 82.6 - 102.9 fL    MCH 30.6 25.2 - 33.5 pg    MCHC 34.9 (H) 28.4 - 34.8 g/dL    RDW 15.6 (H) 11.8 - 14.4 %    Platelets 150 806 - 871 k/uL    MPV 8.7 8.1 - 13.5 fL    NRBC Automated 0.0 0.0 per 100 WBC    Seg Neutrophils 72 (H) 36 - 65 %    Lymphocytes 19 (L) 24 - 43 %    Monocytes 6 3 - 12 %    Eosinophils % 2 1 - 4 %    Basophils 1 0 - 2 %    Immature Granulocytes 0 0 %    Segs Absolute 6.06 1.50 - 8.10 k/uL    Absolute Lymph # 1.62 1.10 - 3.70 k/uL    Absolute Mono # 0.46 0.10 - 1.20 k/uL    Absolute Eos # 0.13 0.00 - 0.44 k/uL    Basophils Absolute 0.04 0.00 - 0.20 k/uL    Absolute Immature Granulocyte 0.03 0.00 - 0.30 k/uL    RBC Morphology ANISOCYTOSIS PRESENT    CMP    Collection Time: 08/08/22  1:00 PM   Result Value Ref Range    Glucose 89 70 - 99 mg/dL    BUN 2 (L) 6 - 20 mg/dL    Creatinine 0.37 (L) 0.70 - 1.20 mg/dL    Calcium 8.8 8.6 - 10.4 mg/dL    Sodium 126 (L) 135 - 144 mmol/L    Potassium 3.9 3.7 - 5.3 mmol/L    Chloride 87 (L) 98 - 107 mmol/L    CO2 23 20 - 31 mmol/L    Anion Gap 16 9 - 17 mmol/L    Alkaline Phosphatase 104 40 - 129 U/L    ALT 8 5 - 41 U/L    AST 26 <40 U/L    Total Bilirubin 0.25 (L) 0.3 - 1.2 mg/dL    Total Protein 7.9 6.4 - 8.3 g/dL    Albumin 4.1 3.5 - 5.2 g/dL    Albumin/Globulin Ratio 1.1 1.0 - 2.5    GFR Non-African American >60 >60 mL/min    GFR African American >60 >60 mL/min    GFR Comment         Sedimentation Rate    Collection Time: 08/08/22  1:00 PM   Result Value Ref Range    Sed Rate 53 (H) 0 - 20 mm/Hr   C-Reactive Protein    Collection Time: 08/08/22  1:00 PM   Result Value Ref Range    CRP 4.5 0.0 - 5.0 mg/L   Protime-INR    Collection Time: 08/08/22  1:00 PM   Result Value Ref Range    Protime 10.2 9.1 - 12.3 sec    INR 0.9    APTT    Collection Time: 08/08/22  1:00 PM   Result Value Ref Range    PTT 31.1 (H) 20.5 - 30.5 sec   Lactic Acid    Collection Time: 08/08/22  1:00 PM   Result Value Ref Range    Lactic Acid, Whole Blood 2.2 (H) 0.7 - 2.1 mmol/L   Urinalysis with Microscopic    Collection Time: 08/08/22  1:48 PM   Result Value Ref Range    Color, UA Yellow Yellow    Turbidity UA Clear Clear    Glucose, Ur NEGATIVE NEGATIVE    Bilirubin Urine NEGATIVE NEGATIVE    Ketones, Urine NEGATIVE NEGATIVE    Specific Gravity, UA 1.006 1.005 - 1.030    Urine Hgb NEGATIVE NEGATIVE    pH, UA 6.0 5.0 - 8.0    Protein, UA NEGATIVE NEGATIVE    Urobilinogen, Urine Normal Normal    Nitrite, Urine NEGATIVE NEGATIVE    Leukocyte Esterase, Urine NEGATIVE NEGATIVE    WBC, UA 0 TO 2 0 - 5 /HPF    RBC, UA 0 TO 2 0 - 2 /HPF    Epithelial Cells UA 0 TO 2 0 - 5 /HPF       Imaging:   XR FOOT RIGHT (MIN 3 VIEWS)    Result Date: 8/8/2022  1. Soft tissue irregularity involving the skin between the 3rd and 4th toes without convincing evidence for distant soft tissue gas. No underlying bone loss identified. 2.  Interval amputation of the distal 2nd toe. Unchanged appearance of distal great toe amputation. ASSESSMENT & PLAN     ASSESSMENT / PLAN:     IMPRESSION  This is a 59 y.o. male who presented with right foot pain and found to have maggots in the wound.  Patient admitted to inpatient status due to infectious disease work-up, podiatry evaluation, antibiotic treatment. Principal Problem:    Right foot wound:  - Podiatry following  - ID Recs appreciated  - Day 1 of vancomycin  - Day 1 of Zosyn  - MRI right foot and decide for AB  - Wound care    Right foot cellulitis  - Continue antibiotics  - ID following  - Follow cultures  - Vascular consulted    Hyponatremia:  - Monitor sodium  - Replace with IV fluids normal saline    Alcoholism:  - Folic acid replacement  - Thiamine replacement      DVT ppx: Heparin subcutaneous  GI ppx: None    PT/OT: Consult  Discharge Planning:  to see patient in the morning    Rhonda Jon MD  Internal Medicine Resident, PGY-1  [facility];  [place], [place]  8/8/2022, 5:35 PM

## 2022-08-09 ENCOUNTER — APPOINTMENT (OUTPATIENT)
Dept: CT IMAGING | Age: 57
DRG: 638 | End: 2022-08-09
Payer: COMMERCIAL

## 2022-08-09 ENCOUNTER — APPOINTMENT (OUTPATIENT)
Dept: MRI IMAGING | Age: 57
DRG: 638 | End: 2022-08-09
Payer: COMMERCIAL

## 2022-08-09 PROBLEM — L08.9 TYPE 2 DIABETES MELLITUS WITH RIGHT DIABETIC FOOT INFECTION (HCC): Status: ACTIVE | Noted: 2022-08-09

## 2022-08-09 PROBLEM — L03.115 CELLULITIS OF RIGHT FOOT: Status: ACTIVE | Noted: 2022-08-09

## 2022-08-09 PROBLEM — E11.628 TYPE 2 DIABETES MELLITUS WITH RIGHT DIABETIC FOOT INFECTION (HCC): Status: ACTIVE | Noted: 2022-08-09

## 2022-08-09 LAB
ABSOLUTE EOS #: 0.15 K/UL (ref 0–0.44)
ABSOLUTE IMMATURE GRANULOCYTE: 0.04 K/UL (ref 0–0.3)
ABSOLUTE LYMPH #: 1.22 K/UL (ref 1.1–3.7)
ABSOLUTE MONO #: 0.7 K/UL (ref 0.1–1.2)
ANION GAP SERPL CALCULATED.3IONS-SCNC: 11 MMOL/L (ref 9–17)
BASOPHILS # BLD: 1 % (ref 0–2)
BASOPHILS ABSOLUTE: 0.06 K/UL (ref 0–0.2)
BUN BLDV-MCNC: 3 MG/DL (ref 6–20)
CALCIUM SERPL-MCNC: 7.9 MG/DL (ref 8.6–10.4)
CHLORIDE BLD-SCNC: 96 MMOL/L (ref 98–107)
CO2: 23 MMOL/L (ref 20–31)
CREAT SERPL-MCNC: 0.38 MG/DL (ref 0.7–1.2)
EOSINOPHILS RELATIVE PERCENT: 2 % (ref 1–4)
GFR AFRICAN AMERICAN: >60 ML/MIN
GFR NON-AFRICAN AMERICAN: >60 ML/MIN
GFR SERPL CREATININE-BSD FRML MDRD: ABNORMAL ML/MIN/{1.73_M2}
GLUCOSE BLD-MCNC: 83 MG/DL (ref 70–99)
HCT VFR BLD CALC: 36.5 % (ref 40.7–50.3)
HEMOGLOBIN: 12.2 G/DL (ref 13–17)
IMMATURE GRANULOCYTES: 1 %
LACTIC ACID, WHOLE BLOOD: 0.9 MMOL/L (ref 0.7–2.1)
LYMPHOCYTES # BLD: 16 % (ref 24–43)
MCH RBC QN AUTO: 30.6 PG (ref 25.2–33.5)
MCHC RBC AUTO-ENTMCNC: 33.4 G/DL (ref 28.4–34.8)
MCV RBC AUTO: 91.5 FL (ref 82.6–102.9)
MONOCYTES # BLD: 9 % (ref 3–12)
NRBC AUTOMATED: 0 PER 100 WBC
PDW BLD-RTO: 16.1 % (ref 11.8–14.4)
PLATELET # BLD: 231 K/UL (ref 138–453)
PMV BLD AUTO: 8.9 FL (ref 8.1–13.5)
POTASSIUM SERPL-SCNC: 3.7 MMOL/L (ref 3.7–5.3)
RBC # BLD: 3.99 M/UL (ref 4.21–5.77)
RBC # BLD: ABNORMAL 10*6/UL
SEG NEUTROPHILS: 71 % (ref 36–65)
SEGMENTED NEUTROPHILS ABSOLUTE COUNT: 5.27 K/UL (ref 1.5–8.1)
SODIUM BLD-SCNC: 130 MMOL/L (ref 135–144)
WBC # BLD: 7.4 K/UL (ref 3.5–11.3)

## 2022-08-09 PROCEDURE — 73700 CT LOWER EXTREMITY W/O DYE: CPT

## 2022-08-09 PROCEDURE — 6360000002 HC RX W HCPCS: Performed by: INTERNAL MEDICINE

## 2022-08-09 PROCEDURE — A9576 INJ PROHANCE MULTIPACK: HCPCS | Performed by: INTERNAL MEDICINE

## 2022-08-09 PROCEDURE — 2580000003 HC RX 258: Performed by: STUDENT IN AN ORGANIZED HEALTH CARE EDUCATION/TRAINING PROGRAM

## 2022-08-09 PROCEDURE — 6370000000 HC RX 637 (ALT 250 FOR IP): Performed by: STUDENT IN AN ORGANIZED HEALTH CARE EDUCATION/TRAINING PROGRAM

## 2022-08-09 PROCEDURE — 2580000003 HC RX 258: Performed by: INTERNAL MEDICINE

## 2022-08-09 PROCEDURE — 94761 N-INVAS EAR/PLS OXIMETRY MLT: CPT

## 2022-08-09 PROCEDURE — 6370000000 HC RX 637 (ALT 250 FOR IP)

## 2022-08-09 PROCEDURE — 99232 SBSQ HOSP IP/OBS MODERATE 35: CPT | Performed by: INTERNAL MEDICINE

## 2022-08-09 PROCEDURE — 6360000002 HC RX W HCPCS: Performed by: STUDENT IN AN ORGANIZED HEALTH CARE EDUCATION/TRAINING PROGRAM

## 2022-08-09 PROCEDURE — 73720 MRI LWR EXTREMITY W/O&W/DYE: CPT

## 2022-08-09 PROCEDURE — 93922 UPR/L XTREMITY ART 2 LEVELS: CPT

## 2022-08-09 PROCEDURE — 36415 COLL VENOUS BLD VENIPUNCTURE: CPT

## 2022-08-09 PROCEDURE — 6370000000 HC RX 637 (ALT 250 FOR IP): Performed by: INTERNAL MEDICINE

## 2022-08-09 PROCEDURE — 6360000002 HC RX W HCPCS

## 2022-08-09 PROCEDURE — 80048 BASIC METABOLIC PNL TOTAL CA: CPT

## 2022-08-09 PROCEDURE — 83605 ASSAY OF LACTIC ACID: CPT

## 2022-08-09 PROCEDURE — 6360000004 HC RX CONTRAST MEDICATION: Performed by: INTERNAL MEDICINE

## 2022-08-09 PROCEDURE — 85025 COMPLETE CBC W/AUTO DIFF WBC: CPT

## 2022-08-09 PROCEDURE — 1200000000 HC SEMI PRIVATE

## 2022-08-09 RX ORDER — LISINOPRIL 10 MG/1
10 TABLET ORAL DAILY
Status: DISCONTINUED | OUTPATIENT
Start: 2022-08-09 | End: 2022-08-10 | Stop reason: HOSPADM

## 2022-08-09 RX ORDER — HYDROCODONE BITARTRATE AND ACETAMINOPHEN 5; 325 MG/1; MG/1
1 TABLET ORAL EVERY 6 HOURS PRN
Status: DISCONTINUED | OUTPATIENT
Start: 2022-08-09 | End: 2022-08-09 | Stop reason: SDUPTHER

## 2022-08-09 RX ORDER — SODIUM HYPOCHLORITE 1.25 MG/ML
SOLUTION TOPICAL DAILY
Status: DISCONTINUED | OUTPATIENT
Start: 2022-08-09 | End: 2022-08-10 | Stop reason: HOSPADM

## 2022-08-09 RX ORDER — HYDROCODONE BITARTRATE AND ACETAMINOPHEN 5; 325 MG/1; MG/1
1 TABLET ORAL EVERY 6 HOURS PRN
Status: DISCONTINUED | OUTPATIENT
Start: 2022-08-09 | End: 2022-08-10 | Stop reason: HOSPADM

## 2022-08-09 RX ORDER — LANOLIN ALCOHOL/MO/W.PET/CERES
100 CREAM (GRAM) TOPICAL DAILY
Status: DISCONTINUED | OUTPATIENT
Start: 2022-08-10 | End: 2022-08-10 | Stop reason: HOSPADM

## 2022-08-09 RX ORDER — SODIUM CHLORIDE 0.9 % (FLUSH) 0.9 %
10 SYRINGE (ML) INJECTION PRN
Status: DISCONTINUED | OUTPATIENT
Start: 2022-08-09 | End: 2022-08-10 | Stop reason: HOSPADM

## 2022-08-09 RX ORDER — LINEZOLID 600 MG/1
600 TABLET, FILM COATED ORAL EVERY 12 HOURS SCHEDULED
Status: DISCONTINUED | OUTPATIENT
Start: 2022-08-09 | End: 2022-08-10 | Stop reason: HOSPADM

## 2022-08-09 RX ADMIN — SODIUM CHLORIDE, PRESERVATIVE FREE 10 ML: 5 INJECTION INTRAVENOUS at 20:20

## 2022-08-09 RX ADMIN — GABAPENTIN 300 MG: 300 CAPSULE ORAL at 08:18

## 2022-08-09 RX ADMIN — SODIUM CHLORIDE: 9 INJECTION, SOLUTION INTRAVENOUS at 03:58

## 2022-08-09 RX ADMIN — SODIUM CHLORIDE: 9 INJECTION, SOLUTION INTRAVENOUS at 22:22

## 2022-08-09 RX ADMIN — LINEZOLID 600 MG: 600 TABLET, FILM COATED ORAL at 20:22

## 2022-08-09 RX ADMIN — LISINOPRIL 10 MG: 10 TABLET ORAL at 15:58

## 2022-08-09 RX ADMIN — GABAPENTIN 300 MG: 300 CAPSULE ORAL at 15:58

## 2022-08-09 RX ADMIN — RIVAROXABAN 20 MG: 20 TABLET, FILM COATED ORAL at 08:18

## 2022-08-09 RX ADMIN — THIAMINE HYDROCHLORIDE 100 MG: 100 INJECTION, SOLUTION INTRAMUSCULAR; INTRAVENOUS at 08:18

## 2022-08-09 RX ADMIN — PANTOPRAZOLE SODIUM 40 MG: 40 TABLET, DELAYED RELEASE ORAL at 15:58

## 2022-08-09 RX ADMIN — PIPERACILLIN AND TAZOBACTAM 3375 MG: 3; .375 INJECTION, POWDER, FOR SOLUTION INTRAVENOUS at 08:03

## 2022-08-09 RX ADMIN — HYDRALAZINE HYDROCHLORIDE 25 MG: 25 TABLET, FILM COATED ORAL at 05:46

## 2022-08-09 RX ADMIN — PIPERACILLIN AND TAZOBACTAM 3375 MG: 3; .375 INJECTION, POWDER, FOR SOLUTION INTRAVENOUS at 18:06

## 2022-08-09 RX ADMIN — GADOTERIDOL 12 ML: 279.3 INJECTION, SOLUTION INTRAVENOUS at 12:21

## 2022-08-09 RX ADMIN — PANTOPRAZOLE SODIUM 40 MG: 40 TABLET, DELAYED RELEASE ORAL at 07:06

## 2022-08-09 RX ADMIN — FOLIC ACID 1 MG: 1 TABLET ORAL at 08:18

## 2022-08-09 RX ADMIN — HYDROCODONE BITARTRATE AND ACETAMINOPHEN 1 TABLET: 5; 325 TABLET ORAL at 15:58

## 2022-08-09 RX ADMIN — VANCOMYCIN HYDROCHLORIDE 750 MG: 10 INJECTION, POWDER, LYOPHILIZED, FOR SOLUTION INTRAVENOUS at 06:56

## 2022-08-09 RX ADMIN — ASPIRIN 81 MG: 81 TABLET, COATED ORAL at 08:16

## 2022-08-09 RX ADMIN — HYDROMORPHONE HYDROCHLORIDE 1 MG: 1 INJECTION, SOLUTION INTRAMUSCULAR; INTRAVENOUS; SUBCUTANEOUS at 20:19

## 2022-08-09 RX ADMIN — GABAPENTIN 300 MG: 300 CAPSULE ORAL at 20:22

## 2022-08-09 RX ADMIN — DESMOPRESSIN ACETATE 40 MG: 0.2 TABLET ORAL at 20:22

## 2022-08-09 RX ADMIN — LINEZOLID 600 MG: 600 TABLET, FILM COATED ORAL at 13:11

## 2022-08-09 ASSESSMENT — PAIN SCALES - GENERAL
PAINLEVEL_OUTOF10: 8

## 2022-08-09 ASSESSMENT — PAIN DESCRIPTION - LOCATION
LOCATION: FOOT

## 2022-08-09 ASSESSMENT — PAIN DESCRIPTION - ORIENTATION
ORIENTATION: RIGHT

## 2022-08-09 ASSESSMENT — ENCOUNTER SYMPTOMS
ABDOMINAL PAIN: 0
COLOR CHANGE: 1
EYE DISCHARGE: 0
APNEA: 0

## 2022-08-09 ASSESSMENT — PAIN DESCRIPTION - PAIN TYPE
TYPE: ACUTE PAIN

## 2022-08-09 ASSESSMENT — PAIN DESCRIPTION - DESCRIPTORS
DESCRIPTORS: ACHING;THROBBING
DESCRIPTORS: THROBBING;SORE

## 2022-08-09 NOTE — PROGRESS NOTES
[facility] -   Infectious diseases evaluation  admission date 8/8/2022    reason for consultation:   R foot diab infection    Impression :   Current:  R diab foot infection w maggots  R foot cellulitis  Periph vasc disease both legs - claudication    Other:  Hyponatremia  Discussion / summary of stay / plan of care   CRP 4.5  Recommendations   Stop  vanco to soare the kidney at this time. Zosyn -zyvox. MRI R foot w OM of the 2nd toe - await surg decisions  CTA angiogram for aortic runoff -   Will follow -      Infection Control Recommendations   [place] Precautions  Contact Isolation       Antimicrobial Stewardship Recommendations   Simplification of therapy  Targeted therapy      History of Present Illness:   Initial history:  Mata Echevarria is a 59y.o.-year-old male w pain R foot and presented after not feeling good and R foot noticed w maggots. Tender R foot and small crack between th toes but no deep drainage - foot very tender to touch in the forefoot area. R foot is a little edematous. Does not walk enough due to pain in calves  Ox 3 and gave hx  Past issues w left foot and post TMA  R foot 2nd toe past tip resection and still is open and not well healed. Interval changes  8/9/2022   Patient Vitals for the past 8 hrs:   BP Temp Temp src Pulse Resp SpO2   08/09/22 0815 (!) 149/75 98.1 °F (36.7 °C) Oral 95 24 96 %   08/09/22 0640 (!) 140/75 -- -- -- -- --   08/09/22 0530 (!) 148/82 98.6 °F (37 °C) Oral 78 16 93 %     -No acute events overnight  -No new complaints-leg pain is controlled with pain meds. -remained hemodynamically stable and afebrile  MRI shows OM second toe  -CT AP runoff showed significant atherosclerosis in pelvis and lower extremity vessels. Summary of relevant labs:  Labs:  W 8-7.4  Creat 0.37-0.38  Liver enz normal    Micro:  BC 8/8 pend    Imaging:  CTAP 8/8    Very suspicious right upper lobe pulmonary nodule.  also has a medial right lower lobe pulmonary nodule  Mediastinal adenopathy  Significant atherosclerosis cyst of infrarenal abdominal aorta with occlusion of right EIA and left IIA. Right IIA and left EIA are extremely narrowed with partial occlusion of the left EIA reconstituted at the top of the femoral canal  Occlusion of superficial femoral arteries with reconstitution in the popliteal canal  Patent right axillary femoral bypass as well as femorofemoral bypass  Enlarged bilateral inguinal lymph nodes which may be incidental to the patient's bypass graft. Cystic structure in the left adnexa-possibly seroma insulin to prior vascular surgery    X-ray right foot 8/8    Impression   1. Soft tissue irregularity involving the skin between the 3rd and 4th toes   without convincing evidence for distant soft tissue gas. No underlying bone   loss identified. 2.  Interval amputation of the distal 2nd toe. Unchanged appearance of   distal great toe amputation. MRI R foot 8/9  Acute osteomyelitis of the 2nd proximal phalangeal shaft. The 2nd toe has   been amputated at the level of the proximal phalangeal neck. The proximal   phalangeal neck appears exposed to the skin surface. Adjacent subcutaneous   edema likely reflects cellulitis. I have personally reviewed the past medical history, past surgical history, medications, social history, and family history, and I haveupdated the database accordingly. Allergies:   Shellfish-derived products     Review of Systems:     Review of Systems   Constitutional:  Positive for activity change (claudication). HENT:  Negative for congestion. Eyes:  Negative for discharge. Respiratory:  Negative for apnea. Cardiovascular:  Negative for chest pain. Gastrointestinal:  Negative for abdominal pain. Endocrine: Negative for cold intolerance and polydipsia. Genitourinary:  Negative for dysuria. Musculoskeletal:  Negative for arthralgias.    Skin:  Positive for color change and wound. Allergic/Immunologic: Negative for immunocompromised state. Neurological:  Negative for dizziness. Hematological:  Negative for adenopathy. Psychiatric/Behavioral:  Negative for agitation. Physical Examination :       Physical Exam  Constitutional:       Appearance: Normal appearance. He is not ill-appearing. HENT:      Head: Normocephalic and atraumatic. Nose: Nose normal.      Mouth/Throat:      Mouth: Mucous membranes are moist.   Eyes:      General: No scleral icterus. Conjunctiva/sclera: Conjunctivae normal.   Cardiovascular:      Rate and Rhythm: Normal rate and regular rhythm. Heart sounds: Normal heart sounds. No murmur heard. Pulmonary:      Effort: No respiratory distress. Breath sounds: Normal breath sounds. Abdominal:      General: There is no distension. Palpations: Abdomen is soft. Tenderness: There is no abdominal tenderness. Genitourinary:     Comments: No esparza  Musculoskeletal:         General: No swelling or deformity. Cervical back: Neck supple. No rigidity. Right lower leg: Edema present. Skin:     Coloration: Skin is not jaundiced. Findings: Erythema (R foot) present. No bruising. Neurological:      Mental Status: He is alert and oriented to person, place, and time. Cranial Nerves: No cranial nerve deficit. Psychiatric:         Mood and Affect: Mood normal.         Thought Content:  Thought content normal.       Past Medical History:     Past Medical History:   Diagnosis Date    Alcohol abuse     CAD (coronary artery disease)     Convulsions ([address]     Frostbite     Hypertension        Past Surgical  History:     Past Surgical History:   Procedure Laterality Date    ABDOMINAL AORTIC ANEURYSM REPAIR Bilateral 6/23/2022    AX-FEM, FEM-FEM BYPASS, X2 GORE PROPATEN GRAFTS 8MM X 80CM, X2 0.8CM X 8CM VASCUGUARD PATCH ** CELL SAVER** performed by Brielle Dumont MD at [address] [place] [address] foot       Medications:      rivaroxaban  20 mg Oral Daily with breakfast    aspirin  81 mg Oral Daily    folic acid  1 mg Oral Daily    gabapentin  300 mg Oral TID    pantoprazole  40 mg Oral BID AC    atorvastatin  40 mg Oral Nightly    sodium chloride flush  5-40 mL IntraVENous 2 times per day    thiamine  100 mg IntraMUSCular Daily    hydrALAZINE  25 mg Oral 3 times per day    vancomycin (VANCOCIN) intermittent dosing (placeholder)   Other RX Placeholder    vancomycin  750 mg IntraVENous Q8H       Social History:     Social History     Socioeconomic History    Marital status:      Spouse name: Not on file    Number of children: Not on file    Years of education: Not on file    Highest education level: Not on file   Occupational History    Not on file   Tobacco Use    Smoking status: Every Day     Packs/day: 1.00     Types: Cigarettes    Smokeless tobacco: Never   Vaping Use    Vaping Use: Not on file   Substance and Sexual Activity    Alcohol use:  Yes     Alcohol/week: 12.0 standard drinks     Types: 12 Cans of beer per week     Comment: approx 12 pack daily    Drug use: No    Sexual activity: Not on file   Other Topics Concern    Not on file   Social History Narrative    Not on file     Social Determinants of Health     Financial Resource Strain: Not on file   Food Insecurity: Not on file   Transportation Needs: Not on file   Physical Activity: Not on file   Stress: Not on file   Social Connections: Not on file   Intimate Partner Violence: Not on file   Housing Stability: Not on file       Family History:     Family History   Problem Relation Age of Onset    Cancer Mother     Heart Disease Father       Medical Decision Making:   I have independently reviewed/ordered the following labs:    CBC with Differential:   Recent Labs     08/08/22  1300 08/09/22  0511   WBC 8.3 7.4   HGB 14.0 12.2*   HCT 40.1* 36.5*    231   LYMPHOPCT 19* 16*   MONOPCT 6 9       BMP:  Recent Labs     08/08/22  1300 08/09/22  0511   * 130*   K 3.9 3.7   CL 87* 96*   CO2 23 23   BUN 2* 3*   CREATININE 0.37* 0.38*       Hepatic Function Panel:   Recent Labs     08/08/22  1300   PROT 7.9   LABALBU 4.1   BILITOT 0.25*   ALKPHOS 104   ALT 8   AST 26       No results for input(s): RPR in the last 72 hours. No results for input(s): HIV in the last 72 hours. No results for input(s): BC in the last 72 hours. Lab Results   Component Value Date/Time    CREATININE 0.38 08/09/2022 05:11 AM    GLUCOSE 83 08/09/2022 05:11 AM    GLUCOSE 85 05/28/2012 05:07 AM       Detailed results: Thank you for allowing us to participate in the care of this patient. Please call with questions. This note is created with the assistance of a speech recognition program.  While intending to generate adocument that actually reflects the content of the visit, the document can still have some errors including those of syntax and sound a like substitutions which may escape proof reading. It such instances, actual meaningcan be extrapolated by contextual diversion. Nik Moran MD  Office: [phone]  Perfect serve / office [phone]        I have discussed the care of the patient, including pertinent history and exam findings,  with the resident. I have seen and examined the patient and the key elements of all parts of the encounter have been performed by me. I agree with the assessment, plan and orders as documented by the resident.     Cate Thorne, Infectious Diseases

## 2022-08-09 NOTE — PROGRESS NOTES
[facility]  Internal Medicine Teaching Residency Program  Inpatient Daily Progress Note  ______________________________________________________________________________    Patient: Rajesh Russo  YOB: 1965   KZO:[id number]    Acct: [de-identified]     Room: [address] [place] [address]  Admit date: 8/8/2022  Today's date: 08/09/22  Number of days in the hospital: 1    SUBJECTIVE   Admitting Diagnosis: Infestation, maggot  CC: Right lower extremity/foot pain    - Pt examined at bedside. Chart & results reviewed. Patient is currently resting in his bed, does complain of mild right foot pain. Patient was prescribed Norco every 6 hours. We will continue to manage pain and manage primary. Plan for today:  -MRI  - Wound care  - Antibiotics  - Vascular, infectious disease, wound care, podiatry will see patient. Review of Systems  Constitutional:  Positive for activity change. Negative for chills and fever. Eyes:  Negative for visual disturbance. Respiratory:  Negative for cough, chest tightness and shortness of breath. Gastrointestinal:  Negative for abdominal distention and abdominal pain. Genitourinary:  Negative for dysuria. Musculoskeletal:         Patient has cramps when walking long distances, patient's right lower extremity has distal amputations, as well as maggots in the wound. Patient has lower extremity pain. BRIEF HISTORY     Rajesh Russo is a 59 y.o. male who presents with maggots in the open wound on his right foot. Patient states that he has a history of his toes turning black, and the last month his toes turn black and 1 fell off. States that he soaks his foot and iodine daily and has recently noticed maggots growing on it. Patient is an alcoholic and states that he got turned around and never followed up with any of his doctors appointments.   He also reports not having followed up with his vascular surgeon after femoral bypass over a month ago.  Patient still has sutures in place. Denies any fevers, chills, nausea, vomiting. Notes that the pain was significantly worse over the last 2 days that he needed to call EMS. OBJECTIVE     Vital Signs:  BP (!) 149/75   Pulse 95   Temp 98.1 °F (36.7 °C) (Oral)   Resp 24   Ht 5' 8\" (1.727 m)   Wt 144 lb 6.4 oz (65.5 kg)   SpO2 96%   BMI 21.96 kg/m²     Temp (24hrs), Av °F (36.7 °C), Min:97.2 °F (36.2 °C), Max:98.6 °F (37 °C)    In: 2454.9   Out: 1400 [Urine:1400]    Physical Exam:  Physical Exam  Constitutional:       General: He is not in acute distress. Appearance: Normal appearance. HENT:      Head: Normocephalic and atraumatic. Nose: Nose normal.      Mouth/Throat:      Mouth: Mucous membranes are moist.      Pharynx: Oropharynx is clear. Eyes:      Extraocular Movements: Extraocular movements intact. Pupils: Pupils are equal, round, and reactive to light. Cardiovascular:      Rate and Rhythm: Normal rate and regular rhythm. Pulses: Normal pulses. Heart sounds: Normal heart sounds. Pulmonary:      Effort: Pulmonary effort is normal.      Breath sounds: Normal breath sounds. Abdominal:      General: Bowel sounds are normal.      Palpations: Abdomen is soft. Musculoskeletal:      Cervical back: Normal range of motion and neck supple. Comments: Patient has right foot distal amputations of digits, and maggots in the wound upon presentation. Also concern for right lower extremity cellulitis. Skin:     General: Skin is warm and dry. Neurological:      General: No focal deficit present. Mental Status: He is alert.          Medications:  Scheduled Medications:    rivaroxaban  20 mg Oral Daily with breakfast    aspirin  81 mg Oral Daily    folic acid  1 mg Oral Daily    gabapentin  300 mg Oral TID    pantoprazole  40 mg Oral BID AC    atorvastatin  40 mg Oral Nightly    sodium chloride flush  5-40 mL IntraVENous 2 times per day    thiamine  100 mg IntraMUSCular Daily    hydrALAZINE  25 mg Oral 3 times per day    vancomycin (VANCOCIN) intermittent dosing (placeholder)   Other RX Placeholder    vancomycin  750 mg IntraVENous Q8H     Continuous Infusions:    sodium chloride      sodium chloride 100 mL/hr at 08/09/22 0548     PRN MedicationsHYDROcodone 5 mg - acetaminophen, 1 tablet, Q6H PRN  sodium chloride flush, 5-40 mL, PRN  sodium chloride, , PRN  ondansetron, 4 mg, Q8H PRN   Or  ondansetron, 4 mg, Q6H PRN  polyethylene glycol, 17 g, Daily PRN  acetaminophen, 650 mg, Q6H PRN   Or  acetaminophen, 650 mg, Q6H PRN  potassium chloride, 40 mEq, PRN   Or  potassium alternative oral replacement, 40 mEq, PRN   Or  potassium chloride, 10 mEq, PRN  magnesium sulfate, 2,000 mg, PRN  labetalol, 10 mg, Q6H PRN        Diagnostic Labs:  CBC:   Recent Labs     08/08/22  1300 08/09/22  0511   WBC 8.3 7.4   RBC 4.58 3.99*   HGB 14.0 12.2*   HCT 40.1* 36.5*   MCV 87.6 91.5   RDW 15.6* 16.1*    231     BMP:   Recent Labs     08/08/22  1300 08/09/22  0511   * 130*   K 3.9 3.7   CL 87* 96*   CO2 23 23   BUN 2* 3*   CREATININE 0.37* 0.38*     BNP: No results for input(s): BNP in the last 72 hours. PT/INR:   Recent Labs     08/08/22  1300   PROTIME 10.2   INR 0.9     APTT:   Recent Labs     08/08/22  1300   APTT 31.1*     CARDIAC ENZYMES: No results for input(s): CKMB, CKMBINDEX, TROPONINI in the last 72 hours. Invalid input(s): CKTOTAL;3  FASTING LIPID PANEL:No results found for: CHOL, HDL, TRIG  LIVER PROFILE:   Recent Labs     08/08/22  1300   AST 26   ALT 8   BILITOT 0.25*   ALKPHOS 104      MICROBIOLOGY:   Lab Results   Component Value Date/Time    CULTURE NO GROWTH 12 HOURS 08/08/2022 01:00 PM       Imaging:    XR FOOT RIGHT (MIN 3 VIEWS)    Result Date: 8/8/2022  1. Soft tissue irregularity involving the skin between the 3rd and 4th toes without convincing evidence for distant soft tissue gas. No underlying bone loss identified.  2.  Interval amputation of the distal 2nd toe. Unchanged appearance of distal great toe amputation. CTA CHEST ABDOMEN AORTA RUNOFF RECON    Result Date: 8/8/2022  1. Within the chest, no aortic aneurysm or dissection. 2.  Patient has a very suspicious right upper lobe pulmonary nodule. Patient also has a medial right lower lobe pulmonary nodule. 3.  Mediastinal adenopathy. 4.  Within the abdomen, the patient has fatty infiltration of the liver and other findings as above. Atherosclerotic calcification of the abdominal aorta is noted without aneurysm or dissection. Full description above. 5.  Within the pelvis, the patient has significant atherosclerotic disease of the infrarenal abdominal aorta and iliac arteries. Occlusion of the right external and left internal iliac arteries is noted. Right internal and left external iliac arteries are extremely narrowed with partial occlusion of the left external iliac artery reconstituted at the top of the femoral canal. 6.  The patient has occlusion of the superficial femoral arteries in each lower extremity with reconstitution in the popliteal canal.  Bilateral trifurcation vessels are very petit but can be followed to the ankle. 7.  Patient has right axillary femoral bypass as well as a fem-fem bypass which appear patent. 8.  Enlarged bilateral inguinal lymph nodes new since prior study which may be incident to the patient's bypass grafts. The patient has a cystic structure in the left adnexal area possibly a seroma incident to prior vascular surgery. Critical results were called by Dr. Bob Diaz MD to Dr. Luis Watson on 8/8/2022 at 18:13. RECOMMENDATIONS: The patient's right lung nodules require further follow-up and/or assessment. Please see below. Consideration may be given to tissue sampling of the dominant nodule in the right apex due to is highly irregular characteristics.  Fleischner Society guidelines for follow-up and management of incidentally detected pulmonary nodules: Multiple Solid Nodules: Nodule size greater than 8 mm In a low-risk patient, CT at 3-6 months, then consider CT at 18-24 months. In a high-risk patient, CT at 3-6 months, then CT at 18-24 months. - Low risk patients include individuals with minimal or absent history of smoking and other known risk factors. - High risk patients include individuals with a history or smoking or known risk factors. Radiology 2017 http://pubs. rsna.org/doi/full/10.1148/radiol. 1605704888       ASSESSMENT & PLAN     Assessment and Plan:    Principal Problem:    Infestation, maggot  Active Problems:    PVD (peripheral vascular disease) (HCC)    Infected wound    Cellulitis of toe  Resolved Problems:    * No resolved hospital problems. *    Right foot wound:  - Podiatry following  - ID Recs appreciated  - Day 2 of vancomycin  - Day 2 of Zosyn  - MRI right foot and decide for AB  - Wound care  - [place] for pain management    Right foot cellulitis  - Continue antibiotics  - ID following  - Follow cultures  - Vascular consulted    Hyponatremia:  - Monitor sodium  - Replace with IV fluids normal saline    Alcoholism:  - Folic acid replacement  - Thiamine replacement        DVT ppx: Heparin subcutaneous  GI ppx: None     PT/OT: Consult  Discharge Planning:  to see patient in the morning      Ligia Kahn M.D. Internal Medicine Resident PGY-1  [place], [place], [address] [place] [address]     10:20 AM 8/9/2022     Please note that part of this chart was generated using voice recognition dictation software. Although every effort was made to ensure the accuracy of this automated transcription, some errors in transcription may have occurred.

## 2022-08-09 NOTE — DISCHARGE INSTRUCTIONS
Continue taking Zyvox for 4 weeks  Continue taking doxycycline for 4 weeks  Continue taking Lactulose  Follow up with PCP  Follow up with podiatry  Follow up with ID  Follow up with Vascular surgery  If symptoms persist or worsen would recommend coming back to the ER    Follow-up with pulmonology regarding right upper lobe and right lower lobe nodule

## 2022-08-09 NOTE — CARE COORDINATION
08/09/22 0955   Service Assessment   Patient Orientation Alert and Oriented;Person;Place;Situation   Cognition Alert   History Provided By Patient   Primary Caregiver Self   Accompanied By/Relationship no one   Support Systems Family Members   PCP Verified by CM   (none)   Prior Functional Level Independent in ADLs/IADLs   Current Functional Level Independent in ADLs/IADLs   Can patient return to prior living arrangement Yes   Ability to make needs known: Good   Financial Resources Medicaid   Social/Functional History   Lives With [address]  (main level apartment)   Home Access Level entry  (4 steps)   Bathroom Shower/Tub Tub/Shower unit;Curtain   [address] Help From Family  (sister Melisa Helm)   ADL Assistance Independent   Homemaking Assistance Independent   Ambulation Assistance Independent   Transfer Assistance Independent   Active  No   Patient's  Info sister or bus   Education 12th grade   Occupation On disability   Discharge Planning   Type of Residence [facility] Prior To Admission [address] [facility]   DME Ordered? No   Potential Assistance Purchasing Medications No   Meds-to-Beds: Does the patient want to have any new prescriptions delivered to bedside prior to discharge? Yes   Type of Home Care Services None   Patient expects to be discharged to: Apartment   One/Two Story Residence One story  (main level apartment)   History of falls?  0   Services At/After Discharge   Transition of Care Consult (CM Consult) Discharge Planning   Services At/After Discharge None   Confirm Follow Up Transport   (needs cab)   Condition of Participation: Discharge Planning   The Plan for Transition of Care is related to the following treatment goals: foot to heal   Freedom of Choice list was provided with basic dialogue that supports the patient's individualized plan of care/goals, treatment preferences, and shares the quality data associated with the providers? Yes   Home alone. Needs cab. Verified address, phone number, and insurance. Verified emergency contact.

## 2022-08-09 NOTE — PROGRESS NOTES
The following physicians are managing this wound podiatric and vascular surgeons. ET Nurse will defer wound care to the physician services    Please re-consult by ordering Wound Ostomy Eval and Treat if future wound, skin, or ostomy care assistance is needed.

## 2022-08-09 NOTE — PLAN OF CARE
Problem: Discharge Planning  Goal: Discharge to home or other facility with appropriate resources  8/9/2022 1841 by Cathy Duckworth RN  Outcome: Progressing  8/9/2022 1841 by Cathy Duckworth RN  Outcome: Progressing  Flowsheets (Taken 8/9/2022 1841)  Discharge to home or other facility with appropriate resources:   Identify barriers to discharge with patient and caregiver   Identify discharge learning needs (meds, wound care, etc)   Arrange for needed discharge resources and transportation as appropriate     Problem: Pain  Goal: Verbalizes/displays adequate comfort level or baseline comfort level  8/9/2022 1841 by Cathy Duckworth RN  Outcome: Progressing  Flowsheets (Taken 8/9/2022 1841)  Verbalizes/displays adequate comfort level or baseline comfort level:   Encourage patient to monitor pain and request assistance   Administer analgesics based on type and severity of pain and evaluate response   Assess pain using appropriate pain scale   Implement non-pharmacological measures as appropriate and evaluate response  8/9/2022 1841 by Cathy Duckworth RN  Outcome: Progressing  Flowsheets (Taken 8/9/2022 1841)  Verbalizes/displays adequate comfort level or baseline comfort level:   Encourage patient to monitor pain and request assistance   Administer analgesics based on type and severity of pain and evaluate response   Assess pain using appropriate pain scale   Implement non-pharmacological measures as appropriate and evaluate response     Problem: ABCDS Injury Assessment  Goal: Absence of physical injury  8/9/2022 1841 by Cathy Duckworth RN  Outcome: Progressing  Flowsheets (Taken 8/9/2022 1841)  Absence of Physical Injury: Implement safety measures based on patient assessment  8/9/2022 1841 by Cathy Duckworth RN  Outcome: Progressing  Flowsheets (Taken 8/9/2022 1841)  Absence of Physical Injury: Implement safety measures based on patient assessment     Problem: Safety - Adult  Goal: Free from fall injury  8/9/2022 1841 by Carlitos Kelly Nemesio Blount RN  Outcome: Progressing  Flowsheets (Taken 8/9/2022 1841)  Free From Fall Injury: Instruct family/caregiver on patient safety  8/9/2022 1841 by Cathy Duckworth RN  Outcome: Progressing  Flowsheets (Taken 8/9/2022 1841)  Free From Fall Injury: Instruct family/caregiver on patient safety     Problem: Skin/Tissue Integrity - Adult  Goal: Incisions, wounds, or drain sites healing without S/S of infection  8/9/2022 1841 by Cathy Duckworth RN  Outcome: Progressing  Flowsheets (Taken 8/9/2022 1841)  Incisions, Wounds, or Drain Sites Healing Without Sign and Symptoms of Infection:   Implement wound care per orders   Initiate isolation precautions as appropriate  8/9/2022 1841 by Cathy Duckworth RN  Outcome: Progressing  Flowsheets (Taken 8/9/2022 1841)  Incisions, Wounds, or Drain Sites Healing Without Sign and Symptoms of Infection:   Implement wound care per orders   Initiate isolation precautions as appropriate     Problem: Musculoskeletal - Adult  Goal: Return mobility to safest level of function  8/9/2022 1841 by Cathy Duckworth RN  Outcome: Progressing  Flowsheets (Taken 8/9/2022 1841)  Return Mobility to Safest Level of Function:   Assess patient stability and activity tolerance for standing, transferring and ambulating with or without assistive devices   Assist with transfers and ambulation using safe patient handling equipment as needed   Ensure adequate protection for wounds/incisions during mobilization   Obtain physical therapy/occupational therapy consults as needed  8/9/2022 1841 by Cathy Duckworth RN  Outcome: Progressing  Flowsheets (Taken 8/9/2022 1841)  Return Mobility to Safest Level of Function:   Assess patient stability and activity tolerance for standing, transferring and ambulating with or without assistive devices   Assist with transfers and ambulation using safe patient handling equipment as needed   Ensure adequate protection for wounds/incisions during mobilization   Obtain physical 1841)  Electrolytes maintained within normal limits:   Monitor labs and assess patient for signs and symptoms of electrolyte imbalances   Administer electrolyte replacement as ordered  Goal: Hemodynamic stability and optimal renal function maintained  8/9/2022 1841 by Chadwick Wilkins RN  Outcome: Progressing  Flowsheets (Taken 8/9/2022 1841)  Hemodynamic stability and optimal renal function maintained:   Monitor labs and assess for signs and symptoms of volume excess or deficit   Monitor intake, output and patient weight   Encourage oral intake as appropriate  8/9/2022 1841 by Chadwick Wilkins RN  Outcome: Progressing  Flowsheets (Taken 8/9/2022 1841)  Hemodynamic stability and optimal renal function maintained:   Monitor labs and assess for signs and symptoms of volume excess or deficit   Monitor intake, output and patient weight   Encourage oral intake as appropriate     Problem: Gastrointestinal - Adult  Goal: Minimal or absence of nausea and vomiting  8/9/2022 1841 by Chadwick Wilkins RN  Outcome: Progressing  Flowsheets (Taken 8/9/2022 1841)  Minimal or absence of nausea and vomiting:   Provide nonpharmacologic comfort measures as appropriate   Administer ordered antiemetic medications as needed  8/9/2022 1841 by Chadwick Wilkins RN  Outcome: Progressing  Flowsheets (Taken 8/9/2022 1841)  Minimal or absence of nausea and vomiting:   Provide nonpharmacologic comfort measures as appropriate   Administer ordered antiemetic medications as needed  Goal: Maintains or returns to baseline bowel function  8/9/2022 1841 by Chadwick Wilkins RN  Outcome: Progressing  Flowsheets (Taken 8/9/2022 1841)  Maintains or returns to baseline bowel function:   Encourage oral fluids to ensure adequate hydration   Administer ordered medications as needed   Administer IV fluids as ordered to ensure adequate hydration   Encourage mobilization and activity  8/9/2022 1841 by Chadwick Wilkins RN  Outcome: Progressing  Flowsheets (Taken 8/9/2022 1841)  Maintains or returns to baseline bowel function:   Encourage oral fluids to ensure adequate hydration   Administer ordered medications as needed   Administer IV fluids as ordered to ensure adequate hydration   Encourage mobilization and activity  Goal: Maintains adequate nutritional intake  8/9/2022 1841 by Jovanna Middleton RN  Outcome: Progressing  Flowsheets (Taken 8/9/2022 1841)  Maintains adequate nutritional intake:   Monitor intake and output, weight and lab values   Assist with meals as needed  8/9/2022 1841 by Jovanna Middleton RN  Outcome: Progressing  Flowsheets (Taken 8/9/2022 1841)  Maintains adequate nutritional intake:   Monitor intake and output, weight and lab values   Assist with meals as needed

## 2022-08-09 NOTE — PROGRESS NOTES
Progress Note  Podiatric Medicine and Surgery     Subjective     CC: Maggot infestation to right foot, pain to right second toe    Patient seen and examined at bedside. No acute events overnight. Afebrile, vital signs stable   Patient still in pain and having to rest right leg in dependent position    HPI :  Bill Lemons is a 59 y.o. male seen at [facility] ED for leg infection to his right forefoot. Patient states that he noticed these a few days ago for which he was pouring peroxide on trying to clean it out but it did not work. Patient states that he had recent amputation at [facility] last month. Patient states that he had a vascular surgery for an aortic abdominal aneurysm repair in June for which she has staples down his midline. Patient states that he was supposed to follow-up with vascular for his foot and his podiatrist but did not. Patient denies fever, chills, diarrhea, vomiting. Patient states that pain is an 8 out of 10 and states that his foot aches when it is up but feels better when it is a in a hanging position. ROS: Denies N/V/F/C/SOB/CP. Otherwise negative except at stated in the HPI.      Medications:  Scheduled Meds:   rivaroxaban  20 mg Oral Daily with breakfast    sodium hypochlorite   Irrigation Daily    lisinopril  10 mg Oral Daily    linezolid  600 mg Oral 2 times per day    [START ON 8/10/2022] thiamine  100 mg Oral Daily    aspirin  81 mg Oral Daily    folic acid  1 mg Oral Daily    gabapentin  300 mg Oral TID    pantoprazole  40 mg Oral BID AC    atorvastatin  40 mg Oral Nightly    sodium chloride flush  5-40 mL IntraVENous 2 times per day       Continuous Infusions:   sodium chloride      sodium chloride 100 mL/hr at 08/09/22 1311       PRN Meds:HYDROcodone 5 mg - acetaminophen, HYDROmorphone, sodium chloride flush, sodium chloride flush, sodium chloride, ondansetron **OR** ondansetron, polyethylene glycol, acetaminophen **OR** acetaminophen, potassium chloride **OR** potassium alternative oral replacement **OR** potassium chloride, magnesium sulfate, labetalol    Objective     Vitals:  Patient Vitals for the past 8 hrs:   BP Temp Temp src Pulse Resp SpO2   22 0815 (!) 149/75 98.1 °F (36.7 °C) Oral 95 24 96 %       Average, Min, and Max for last 24 hours Vitals:  TEMPERATURE:  Temp  Av °F (36.7 °C)  Min: 97.2 °F (36.2 °C)  Max: 98.6 °F (37 °C)    RESPIRATIONS RANGE: Resp  Av.3  Min: 14  Max: 24    PULSE RANGE: Pulse  Av.7  Min: 58  Max: 95    BLOOD PRESSURE RANGE:  Systolic (29HYO), XAE:059 , Min:103 , OVL:724   ; Diastolic (08ZAB), LFJ:53, Min:63, Max:82      PULSE OXIMETRY RANGE: SpO2  Av.3 %  Min: 93 %  Max: 96 %    I/O last 3 completed shifts: In: 2454.9 [P.O.:800; I.V.:1154.9; IV Piggyback:500]  Out: 1400 [Urine:1400]    CBC:  Recent Labs     22  1300 22  0511   WBC 8.3 7.4   HGB 14.0 12.2*   HCT 40.1* 36.5*    231   CRP 4.5  --           BMP:  Recent Labs     22  1300 22  0511   * 130*   K 3.9 3.7   CL 87* 96*   CO2 23 23   BUN 2* 3*   CREATININE 0.37* 0.38*   GLUCOSE 89 83   CALCIUM 8.8 7.9*          Coags:  Recent Labs     22  1300   APTT 31.1*   PROT 7.9   INR 0.9         Lab Results   Component Value Date    SEDRATE 53 (H) 2022     Recent Labs     22  1300   CRP 4.5         Lower Extremity Physical Exam:  Vascular: DP and PT pulses are non-palpable. Audible per Doppler. CFT <3 seconds to all digits. Hair growth is absent to the level of the digits. Moderate edema. Neuro: Saph/sural/SP/DP/plantar sensation intact to light touch. Musculoskeletal: Muscle strength is 4/5 to all lower extremity muscle groups. Gross deformity is now noticeable clinically. Dermatologic: Full thickness ulcer #1 located distal aspect second digit and measures approximately 1 cm x by 1 cm x by 1.5 cm. Base is granular. Periwound skin decreased erythema. No maggots found on foot.   Serosanguineous drainage erythema to the distal forefoot with associated increase in warmth. Does probe to bone and does undermine. No fluctuance, crepitus, or induration. There is a superficial wound to the distal aspect of the dorsal forefoot just proximal to the levels of the metatarsophalangeal joint. Wound is a granular base. The wound is 10 cm x 4.5 cm by superficial.  No malodor     Interdigital maceration present with maggots infestation. Clinical Images:        Imaging:   MRI FOOT RIGHT W WO CONTRAST   Preliminary Result   Acute osteomyelitis of the 2nd proximal phalangeal shaft. The 2nd toe has   been amputated at the level of the proximal phalangeal neck. The proximal   phalangeal neck appears exposed to the skin surface. Adjacent subcutaneous   edema likely reflects cellulitis. No defined abscess identified. XR FOOT RIGHT (MIN 3 VIEWS)   Final Result   1. Soft tissue irregularity involving the skin between the 3rd and 4th toes   without convincing evidence for distant soft tissue gas. No underlying bone   loss identified. 2.  Interval amputation of the distal 2nd toe. Unchanged appearance of   distal great toe amputation. VL JIMBO BILATERAL LIMITED 1-2 LEVELS    (Results Pending)   CT FOOT RIGHT WO CONTRAST    (Results Pending)       Cultures: Still pending      Assessment   David Nieto is a 59 y.o. male with   Maggot infestation, right foot,  Osteomyelitis right second digit right foot  Cellulitis right foot  Right foot    Principal Problem:    Infestation, maggot  Active Problems:    PVD (peripheral vascular disease) ([address]    Infected wound    Cellulitis of toe  Resolved Problems:    * No resolved hospital problems. *       Plan     Patient examined and evaluated at bedside   Treatment options discussed in detail with the patient  Radiographs reviewed and discussed in detail with patient.   Plain film radiographs show no evidence of cortical lysis to the distal aspect of the digits that would suggest osteomyelitis. No evidence of soft tissue gas. No evidence of soft tissue gas that tracks proximally. MRI right foot reviewed: possible reactive osteitis vs osteomyelitis to right second digit, second proximal phalangeal shaft with adjacent subcutaneous edema likely reflecting cellulitis. No defined abscess identified. No acute surgical intervention planned at this time on podiatry standpoint  Bedside washout performed with 1 L of saline and peroxide for removal of all residual maggot infestation if present within the interspaces. Patient underwent fairly significant pain and therefore received pain medication. Dressing applied to Right foot: Betadine soaked 4 x 4, DSD with gauze, Kerlix, Ace  Patient admitted for IV antibiotics due to cellulitis and maggot infestation  Continue vancomycin and linezolid, following ID recommendations  Appreciate vascular recommendations-JIMBO value,: L 0.47 R 0.36  Patient instructed to remain heel weightbearing only to right LE  Discussed with Dr. Jason Ayala DPM   Podiatric Medicine & Surgery   8/9/2022 at 3:15 PM    Senior Resident Statement:  I have discussed the case, including pertinent history and exam findings with the resident. I agree with the assessment, plan and orders as documented by the intern. Any changes were made in the note above.        Electronically signed by Eris Rondon DPM on 8/9/2022 at 9:49 PM

## 2022-08-09 NOTE — PROGRESS NOTES
Physical Therapy        Physical Therapy Cancel Note      DATE: 2022    NAME: Colleen [facility]  MRN: [id number]   : 1965      Patient not seen this date for Physical Therapy due to: Other: Podiatry working with pt, stretcher present and pt then going for CT, MRI and VL JIMBO.  Anticipated next check 8/10      Electronically signed by Josep Wells PT on 879 at 10:37 AM

## 2022-08-09 NOTE — PROGRESS NOTES
Division of Vascular Surgery             Progress Note      Name: Omar Tristan  MRN: [id number]         Overnight Events:   No acute event overnight      Subjective:   Pt is seen and examined this morning. Afebrile and no acute event. Patient is eating and drinking. Wound dress was changed this morning, maggots have been largely washed out by nurse team. Patient is still having mild resting pain on his right foot, but intact motor and sensory. B/l doppler signal on PT and DP. Physical Exam:     Vitals:  BP (!) 149/75   Pulse 95   Temp 98.1 °F (36.7 °C) (Oral)   Resp 24   Ht 5' 8\" (1.727 m)   Wt 144 lb 6.4 oz (65.5 kg)   SpO2 96%   BMI 21.96 kg/m²     HENT:     Head: Normocephalic. Right Ear: External ear normal.     Left Ear: External ear normal.     Nose: Nose normal.     Mouth/Throat:     Mouth: Mucous membranes are moist.  Eyes:     Pupils: Pupils are equal, round, and reactive to light. Cardiovascular:     Rate and Rhythm: Normal rate and regular rhythm. Pulmonary:     Effort: Pulmonary effort is normal.     Breath sounds: Normal breath sounds. Abdominal:     General: Abdomen is flat. Palpations: Abdomen is soft. Musculoskeletal:         General: Normal range of motion. Cervical back: Normal range of motion. Comments: Left leg all toes are amputated, multiple dry red scabs throughout the LLE. Erythematous, edematous and swollen RLE. 1st and 2nd right toes are partially removed. 2nd right toe oozing with blood and mucus with open fresh wound. Maggots located at the gap between 1-2nd, 3-4th and 4-5th toes, and inside the 2nd toe. Number of maggots have reduced, washed out by nursing staff and podiatry team.  Skin:     Capillary Refill: Capillary refill takes 2 to 3 seconds. Findings: Erythema, lesion and rash present. Neurological:     Mental Status: He is alert and oriented to person, place, and time.   Psychiatric:         Mood and Affect: Mood normal.         Behavior: Behavior normal.    Data:  CBC:   Recent Labs     08/08/22  1300 08/09/22  0511   WBC 8.3 7.4   HGB 14.0 12.2*    231     Chemistry:   Recent Labs     08/08/22  1300 08/09/22  0511   * 130*   K 3.9 3.7   CL 87* 96*   CO2 23 23   GLUCOSE 89 83   BUN 2* 3*   CREATININE 0.37* 0.38*   ANIONGAP 16 11   LABGLOM >60 >60   GFRAA >60 >60   CALCIUM 8.8 7.9*   LACTACIDWB 2.2* 0.9     Hepatic:   Recent Labs     08/08/22  1300   AST 26   ALT 8   ALKPHOS 104   BILITOT 0.25*     Coagulation:   Recent Labs     08/08/22  1300   APTT 31.1*   PROT 7.9   INR 0.9         Radiology Review:    XR FOOT RIGHT (MIN 3 VIEWS)    Result Date: 8/8/2022  1. Soft tissue irregularity involving the skin between the 3rd and 4th toes without convincing evidence for distant soft tissue gas. No underlying bone loss identified. 2.  Interval amputation of the distal 2nd toe. Unchanged appearance of distal great toe amputation. MRI FOOT RIGHT W WO CONTRAST    Result Date: 8/9/2022  Acute osteomyelitis of the 2nd proximal phalangeal shaft. The 2nd toe has been amputated at the level of the proximal phalangeal neck. The proximal phalangeal neck appears exposed to the skin surface. Adjacent subcutaneous edema likely reflects cellulitis. No defined abscess identified. CTA CHEST ABDOMEN AORTA RUNOFF RECON    Result Date: 8/8/2022  1. Within the chest, no aortic aneurysm or dissection. 2.  Patient has a very suspicious right upper lobe pulmonary nodule. Patient also has a medial right lower lobe pulmonary nodule. 3.  Mediastinal adenopathy. 4.  Within the abdomen, the patient has fatty infiltration of the liver and other findings as above. Atherosclerotic calcification of the abdominal aorta is noted without aneurysm or dissection. Full description above. 5.  Within the pelvis, the patient has significant atherosclerotic disease of the infrarenal abdominal aorta and iliac arteries.   Occlusion of the right external and left internal iliac arteries is noted. Right internal and left external iliac arteries are extremely narrowed with partial occlusion of the left external iliac artery reconstituted at the top of the femoral canal. 6.  The patient has occlusion of the superficial femoral arteries in each lower extremity with reconstitution in the popliteal canal.  Bilateral trifurcation vessels are very petit but can be followed to the ankle. 7.  Patient has right axillary femoral bypass as well as a fem-fem bypass which appear patent. 8.  Enlarged bilateral inguinal lymph nodes new since prior study which may be incident to the patient's bypass grafts. The patient has a cystic structure in the left adnexal area possibly a seroma incident to prior vascular surgery. Critical results were called by Dr. Lois Montero MD to Dr. Norma Martel on 8/8/2022 at 18:13. RECOMMENDATIONS: The patient's right lung nodules require further follow-up and/or assessment. Please see below. Consideration may be given to tissue sampling of the dominant nodule in the right apex due to is highly irregular characteristics. Fleischner Society guidelines for follow-up and management of incidentally detected pulmonary nodules: Multiple Solid Nodules: Nodule size greater than 8 mm In a low-risk patient, CT at 3-6 months, then consider CT at 18-24 months. In a high-risk patient, CT at 3-6 months, then CT at 18-24 months. - Low risk patients include individuals with minimal or absent history of smoking and other known risk factors. - High risk patients include individuals with a history or smoking or known risk factors. Radiology 2017 http://pubs. rsna.org/doi/full/10.1148/radiol. 8370653756             Assessment:   59 y.o. man with hx of leg toes amputation, right toes partially removed, [id number] bypass 06/2022 presents for right leg pain and right lower leg maggots. Procedure in 06/2022:  1.   Bilateral common femoral endarterectomy with bovine pericardial patch angioplasty. 2.  Right profunda endarterectomy with bovine pericardial patch angioplasty. 3.  Right axillary to right femoral bypass with 8 mm ringed PTFE. 4.  Right common femoral to left common femoral bypass with 8 mm ringed PTFE. 5.  Mini exploratory laparotomy to investigate the proximal infrarenal aorta. Plan:     discussed patient, history and physical with attending  CTA of chest/ab/pelvic round-off. Patent axi-bifem bypass  JIMBO of LE: L 0.47 R 0.36  Daily wound dressing changes.  Iodoform strip applied into his second toe  Podiatry is consulted  ID is consulted          [id number] [facility]  Electronically signed by Pam Denton DO  on 8/9/2022 at 1:29 PM

## 2022-08-10 VITALS
TEMPERATURE: 97.5 F | HEART RATE: 54 BPM | RESPIRATION RATE: 18 BRPM | BODY MASS INDEX: 21.89 KG/M2 | OXYGEN SATURATION: 99 % | SYSTOLIC BLOOD PRESSURE: 129 MMHG | WEIGHT: 144.4 LBS | HEIGHT: 68 IN | DIASTOLIC BLOOD PRESSURE: 68 MMHG

## 2022-08-10 LAB
ABSOLUTE EOS #: 0.17 K/UL (ref 0–0.44)
ABSOLUTE IMMATURE GRANULOCYTE: <0.03 K/UL (ref 0–0.3)
ABSOLUTE LYMPH #: 1.57 K/UL (ref 1.1–3.7)
ABSOLUTE MONO #: 0.64 K/UL (ref 0.1–1.2)
ANION GAP SERPL CALCULATED.3IONS-SCNC: 10 MMOL/L (ref 9–17)
BASOPHILS # BLD: 1 % (ref 0–2)
BASOPHILS ABSOLUTE: 0.04 K/UL (ref 0–0.2)
BUN BLDV-MCNC: 3 MG/DL (ref 6–20)
CALCIUM SERPL-MCNC: 8 MG/DL (ref 8.6–10.4)
CHLORIDE BLD-SCNC: 100 MMOL/L (ref 98–107)
CO2: 21 MMOL/L (ref 20–31)
CREAT SERPL-MCNC: 0.48 MG/DL (ref 0.7–1.2)
EOSINOPHILS RELATIVE PERCENT: 3 % (ref 1–4)
GFR AFRICAN AMERICAN: >60 ML/MIN
GFR NON-AFRICAN AMERICAN: >60 ML/MIN
GFR SERPL CREATININE-BSD FRML MDRD: ABNORMAL ML/MIN/{1.73_M2}
GLUCOSE BLD-MCNC: 116 MG/DL (ref 70–99)
HCT VFR BLD CALC: 34.2 % (ref 40.7–50.3)
HEMOGLOBIN: 11.7 G/DL (ref 13–17)
IMMATURE GRANULOCYTES: 0 %
LYMPHOCYTES # BLD: 24 % (ref 24–43)
MCH RBC QN AUTO: 30.9 PG (ref 25.2–33.5)
MCHC RBC AUTO-ENTMCNC: 34.2 G/DL (ref 28.4–34.8)
MCV RBC AUTO: 90.2 FL (ref 82.6–102.9)
MONOCYTES # BLD: 10 % (ref 3–12)
NRBC AUTOMATED: 0 PER 100 WBC
PDW BLD-RTO: 15.9 % (ref 11.8–14.4)
PLATELET # BLD: 226 K/UL (ref 138–453)
PMV BLD AUTO: 9.3 FL (ref 8.1–13.5)
POTASSIUM SERPL-SCNC: 4.1 MMOL/L (ref 3.7–5.3)
RBC # BLD: 3.79 M/UL (ref 4.21–5.77)
RBC # BLD: ABNORMAL 10*6/UL
SEG NEUTROPHILS: 62 % (ref 36–65)
SEGMENTED NEUTROPHILS ABSOLUTE COUNT: 4.02 K/UL (ref 1.5–8.1)
SODIUM BLD-SCNC: 131 MMOL/L (ref 135–144)
WBC # BLD: 6.5 K/UL (ref 3.5–11.3)

## 2022-08-10 PROCEDURE — 97162 PT EVAL MOD COMPLEX 30 MIN: CPT

## 2022-08-10 PROCEDURE — 80048 BASIC METABOLIC PNL TOTAL CA: CPT

## 2022-08-10 PROCEDURE — 97166 OT EVAL MOD COMPLEX 45 MIN: CPT

## 2022-08-10 PROCEDURE — 97530 THERAPEUTIC ACTIVITIES: CPT

## 2022-08-10 PROCEDURE — 2580000003 HC RX 258: Performed by: STUDENT IN AN ORGANIZED HEALTH CARE EDUCATION/TRAINING PROGRAM

## 2022-08-10 PROCEDURE — 6360000002 HC RX W HCPCS: Performed by: INTERNAL MEDICINE

## 2022-08-10 PROCEDURE — 36415 COLL VENOUS BLD VENIPUNCTURE: CPT

## 2022-08-10 PROCEDURE — 6370000000 HC RX 637 (ALT 250 FOR IP)

## 2022-08-10 PROCEDURE — 2580000003 HC RX 258: Performed by: INTERNAL MEDICINE

## 2022-08-10 PROCEDURE — 6370000000 HC RX 637 (ALT 250 FOR IP): Performed by: INTERNAL MEDICINE

## 2022-08-10 PROCEDURE — 85025 COMPLETE CBC W/AUTO DIFF WBC: CPT

## 2022-08-10 PROCEDURE — 6370000000 HC RX 637 (ALT 250 FOR IP): Performed by: STUDENT IN AN ORGANIZED HEALTH CARE EDUCATION/TRAINING PROGRAM

## 2022-08-10 PROCEDURE — 99232 SBSQ HOSP IP/OBS MODERATE 35: CPT | Performed by: INTERNAL MEDICINE

## 2022-08-10 PROCEDURE — 97535 SELF CARE MNGMENT TRAINING: CPT

## 2022-08-10 RX ORDER — ACETAMINOPHEN 160 MG
TABLET,DISINTEGRATING ORAL PRN
Status: DISCONTINUED | OUTPATIENT
Start: 2022-08-10 | End: 2022-08-10 | Stop reason: HOSPADM

## 2022-08-10 RX ORDER — ACETAMINOPHEN 650 MG
TABLET, EXTENDED RELEASE ORAL PRN
Status: DISCONTINUED | OUTPATIENT
Start: 2022-08-10 | End: 2022-08-10 | Stop reason: HOSPADM

## 2022-08-10 RX ORDER — SODIUM HYPOCHLORITE 1.25 MG/ML
1 SOLUTION TOPICAL DAILY PRN
Status: DISCONTINUED | OUTPATIENT
Start: 2022-08-10 | End: 2022-08-10 | Stop reason: HOSPADM

## 2022-08-10 RX ORDER — HYDROCODONE BITARTRATE AND ACETAMINOPHEN 5; 325 MG/1; MG/1
1 TABLET ORAL EVERY 8 HOURS PRN
Qty: 10 TABLET | Refills: 0 | Status: SHIPPED | OUTPATIENT
Start: 2022-08-10 | End: 2022-08-14

## 2022-08-10 RX ORDER — LACTOBACILLUS RHAMNOSUS GG 10B CELL
1 CAPSULE ORAL DAILY
Qty: 42 CAPSULE | Refills: 0 | Status: SHIPPED | OUTPATIENT
Start: 2022-08-10 | End: 2022-09-21

## 2022-08-10 RX ORDER — LINEZOLID 600 MG/1
600 TABLET, FILM COATED ORAL EVERY 12 HOURS SCHEDULED
Qty: 28 TABLET | Refills: 0 | Status: SHIPPED | OUTPATIENT
Start: 2022-08-10 | End: 2022-08-24

## 2022-08-10 RX ORDER — DOXYCYCLINE HYCLATE 100 MG
100 TABLET ORAL 2 TIMES DAILY
Qty: 56 TABLET | Refills: 0 | Status: SHIPPED | OUTPATIENT
Start: 2022-08-10 | End: 2022-09-07

## 2022-08-10 RX ORDER — AMLODIPINE BESYLATE 5 MG/1
5 TABLET ORAL DAILY
Status: DISCONTINUED | OUTPATIENT
Start: 2022-08-10 | End: 2022-08-10 | Stop reason: HOSPADM

## 2022-08-10 RX ORDER — SODIUM HYPOCHLORITE 1.25 MG/ML
SOLUTION TOPICAL DAILY
Qty: 1 EACH | Refills: 1 | Status: SHIPPED | OUTPATIENT
Start: 2022-08-11

## 2022-08-10 RX ORDER — LANOLIN ALCOHOL/MO/W.PET/CERES
100 CREAM (GRAM) TOPICAL DAILY
Qty: 30 TABLET | Refills: 3 | Status: SHIPPED | OUTPATIENT
Start: 2022-08-11

## 2022-08-10 RX ADMIN — PANTOPRAZOLE SODIUM 40 MG: 40 TABLET, DELAYED RELEASE ORAL at 07:56

## 2022-08-10 RX ADMIN — RIVAROXABAN 20 MG: 20 TABLET, FILM COATED ORAL at 07:56

## 2022-08-10 RX ADMIN — HYDROCODONE BITARTRATE AND ACETAMINOPHEN 1 TABLET: 5; 325 TABLET ORAL at 14:57

## 2022-08-10 RX ADMIN — AMLODIPINE BESYLATE 5 MG: 5 TABLET ORAL at 09:15

## 2022-08-10 RX ADMIN — ASPIRIN 81 MG: 81 TABLET, COATED ORAL at 09:32

## 2022-08-10 RX ADMIN — PIPERACILLIN AND TAZOBACTAM 3375 MG: 3; .375 INJECTION, POWDER, FOR SOLUTION INTRAVENOUS at 09:29

## 2022-08-10 RX ADMIN — SODIUM CHLORIDE, PRESERVATIVE FREE 10 ML: 5 INJECTION INTRAVENOUS at 09:22

## 2022-08-10 RX ADMIN — PIPERACILLIN AND TAZOBACTAM 3375 MG: 3; .375 INJECTION, POWDER, FOR SOLUTION INTRAVENOUS at 00:54

## 2022-08-10 RX ADMIN — LISINOPRIL 10 MG: 10 TABLET ORAL at 09:32

## 2022-08-10 RX ADMIN — PANTOPRAZOLE SODIUM 40 MG: 40 TABLET, DELAYED RELEASE ORAL at 15:45

## 2022-08-10 RX ADMIN — FOLIC ACID 1 MG: 1 TABLET ORAL at 09:32

## 2022-08-10 RX ADMIN — GABAPENTIN 300 MG: 300 CAPSULE ORAL at 14:58

## 2022-08-10 RX ADMIN — DAKIN'S SOLUTION 0.125% (QUARTER STRENGTH): 0.12 SOLUTION at 09:23

## 2022-08-10 RX ADMIN — GABAPENTIN 300 MG: 300 CAPSULE ORAL at 09:32

## 2022-08-10 RX ADMIN — HYDROCODONE BITARTRATE AND ACETAMINOPHEN 1 TABLET: 5; 325 TABLET ORAL at 07:56

## 2022-08-10 RX ADMIN — Medication 100 MG: at 09:32

## 2022-08-10 RX ADMIN — LINEZOLID 600 MG: 600 TABLET, FILM COATED ORAL at 09:31

## 2022-08-10 ASSESSMENT — ENCOUNTER SYMPTOMS
COLOR CHANGE: 1
APNEA: 0
EYE DISCHARGE: 0
ABDOMINAL PAIN: 0

## 2022-08-10 ASSESSMENT — PAIN SCALES - GENERAL
PAINLEVEL_OUTOF10: 7
PAINLEVEL_OUTOF10: 8
PAINLEVEL_OUTOF10: 7

## 2022-08-10 ASSESSMENT — PAIN DESCRIPTION - LOCATION
LOCATION: FOOT
LOCATION: FOOT

## 2022-08-10 ASSESSMENT — PAIN DESCRIPTION - ORIENTATION
ORIENTATION: RIGHT
ORIENTATION: RIGHT

## 2022-08-10 ASSESSMENT — PAIN DESCRIPTION - DESCRIPTORS
DESCRIPTORS: ACHING;THROBBING
DESCRIPTORS: ACHING;THROBBING

## 2022-08-10 ASSESSMENT — PAIN - FUNCTIONAL ASSESSMENT: PAIN_FUNCTIONAL_ASSESSMENT: PREVENTS OR INTERFERES SOME ACTIVE ACTIVITIES AND ADLS

## 2022-08-10 ASSESSMENT — PAIN DESCRIPTION - PAIN TYPE: TYPE: ACUTE PAIN

## 2022-08-10 NOTE — PROGRESS NOTES
[facility]  Internal Medicine Teaching Residency Program  Inpatient Daily Progress Note  ______________________________________________________________________________    Patient: Ashlee Guerrero  YOB: 1965   NTA:[id number]    Acct: [de-identified]     Room: [address] [place] [address]  Admit date: 8/8/2022  Today's date: 08/10/22  Number of days in the hospital: 2    SUBJECTIVE   Admitting Diagnosis: Infestation, maggot  CC: Right lower extremity/foot pain     - Pt examined at bedside. Chart & results reviewed. Patient is currently resting in his bed, does complain of mild right foot pain. Patient was prescribed Norco every 6 hours. DC today if no intervention is indicated by consulting teams. Plan for today:  - MRI showed   Acute osteomyelitis of the 2nd proximal phalangeal shaft. The 2nd toe has   been amputated at the level of the proximal phalangeal neck. The proximal   phalangeal neck appears exposed to the skin surface. Adjacent subcutaneous   edema likely reflects cellulitis. No defined abscess identified.      - Wound care  - Zyvox day 2   -- Zosyn day 3  - Vascular, infectious disease, podiatry consulted         Review of Systems  Constitutional:  Positive for activity change. Negative for chills and fever. Eyes:  Negative for visual disturbance. Respiratory:  Negative for cough, chest tightness and shortness of breath. Gastrointestinal:  Negative for abdominal distention and abdominal pain. Genitourinary:  Negative for dysuria. Musculoskeletal:         Patient has cramps when walking long distances, patient's right lower extremity has distal amputations, as well as maggots in the wound. Patient has lower extremity pain. BRIEF HISTORY       Ashlee Guerrero is a 59 y.o. male who presents with maggots in the open wound on his right foot.   Patient states that he has a history of his toes turning black, and the last month his toes turn black TROPONINI in the last 72 hours. Invalid input(s): CKTOTAL;3  FASTING LIPID PANEL:No results found for: CHOL, HDL, TRIG  LIVER PROFILE:   Recent Labs     08/08/22  1300   AST 26   ALT 8   BILITOT 0.25*   ALKPHOS 104      MICROBIOLOGY:   Lab Results   Component Value Date/Time    CULTURE NO GROWTH 1 DAY 08/08/2022 01:00 PM       Imaging:    XR FOOT RIGHT (MIN 3 VIEWS)    Result Date: 8/8/2022  1. Soft tissue irregularity involving the skin between the 3rd and 4th toes without convincing evidence for distant soft tissue gas. No underlying bone loss identified. 2.  Interval amputation of the distal 2nd toe. Unchanged appearance of distal great toe amputation. CT FOOT RIGHT WO CONTRAST    Result Date: 8/9/2022  1. Status post partial 2nd toe amputation with questionable erosive changes at the distal amputation margin of the proximal phalanx possibly representing osteomyelitis. 2. Deep soft tissue defect of the distal aspect of the 2nd toe with extensive subcutaneous edema compatible with cellulitis. No soft tissue gas or drainable fluid collection identified. MRI FOOT RIGHT W WO CONTRAST    Result Date: 8/9/2022  Acute osteomyelitis of the 2nd proximal phalangeal shaft. The 2nd toe has been amputated at the level of the proximal phalangeal neck. The proximal phalangeal neck appears exposed to the skin surface. Adjacent subcutaneous edema likely reflects cellulitis. No defined abscess identified. CTA CHEST ABDOMEN AORTA RUNOFF RECON    Result Date: 8/8/2022  1. Within the chest, no aortic aneurysm or dissection. 2.  Patient has a very suspicious right upper lobe pulmonary nodule. Patient also has a medial right lower lobe pulmonary nodule. 3.  Mediastinal adenopathy. 4.  Within the abdomen, the patient has fatty infiltration of the liver and other findings as above. Atherosclerotic calcification of the abdominal aorta is noted without aneurysm or dissection. Full description above.  5.  Within the pelvis, the patient has significant atherosclerotic disease of the infrarenal abdominal aorta and iliac arteries. Occlusion of the right external and left internal iliac arteries is noted. Right internal and left external iliac arteries are extremely narrowed with partial occlusion of the left external iliac artery reconstituted at the top of the femoral canal. 6.  The patient has occlusion of the superficial femoral arteries in each lower extremity with reconstitution in the popliteal canal.  Bilateral trifurcation vessels are very petit but can be followed to the ankle. 7.  Patient has right axillary femoral bypass as well as a fem-fem bypass which appear patent. 8.  Enlarged bilateral inguinal lymph nodes new since prior study which may be incident to the patient's bypass grafts. The patient has a cystic structure in the left adnexal area possibly a seroma incident to prior vascular surgery. Critical results were called by Dr. Kishore Haile MD to Dr. Kusum Helm on 8/8/2022 at 18:13. RECOMMENDATIONS: The patient's right lung nodules require further follow-up and/or assessment. Please see below. Consideration may be given to tissue sampling of the dominant nodule in the right apex due to is highly irregular characteristics. Fleischner Society guidelines for follow-up and management of incidentally detected pulmonary nodules: Multiple Solid Nodules: Nodule size greater than 8 mm In a low-risk patient, CT at 3-6 months, then consider CT at 18-24 months. In a high-risk patient, CT at 3-6 months, then CT at 18-24 months. - Low risk patients include individuals with minimal or absent history of smoking and other known risk factors. - High risk patients include individuals with a history or smoking or known risk factors. Radiology 2017 http://pubs. rsna.org/doi/full/10.1148/radiol. 5329056070       ASSESSMENT & PLAN     Assessment and Plan:    Principal Problem:    Infestation, maggot  Active Problems:    PVD (peripheral vascular disease) ([address]    Infected wound    Cellulitis of toe    Type 2 diabetes mellitus with right diabetic foot infection ([address]    Cellulitis of right foot  Resolved Problems:    * No resolved hospital problems. *      Right foot wound:  - Podiatry following  - ID consulted and managing Abx   - Day 2 of vancomycin Dc'd, now Zyvox day 2   - Day 3 of Zosyn  - MRI right foot showed osteomyelitis   - Wound care, by podiatry   - Standard [place] for pain management    Right foot cellulitis  - Continue antibiotics  - ID following  - Follow cultures  - Vascular consulted   -JIMBO of LE: L 0.47 R 0.36    Hyponatremia:  - Monitor sodium  - Replace with IV fluids normal saline    Alcoholism:  - Folic acid replacement  - Thiamine replacement        DVT ppx: Heparin subcutaneous  GI ppx: None     PT/OT: Consult  Discharge Planning: Discharge if no interventions by consulting teams. Jose Azevedo M.D. Internal Medicine Resident PGY-1  [facility].    7:20 AM 8/10/2022     Please note that part of this chart was generated using voice recognition dictation software. Although every effort was made to ensure the accuracy of this automated transcription, some errors in transcription may have occurred.

## 2022-08-10 NOTE — CARE COORDINATION
Transitional planning-talked with patient about home care. Agreed. Ok with [facility]. Referral faxed and called to Andre with [facility]. Yue called back-insurance OON. Patient has no PCP-Talked with Podiatry to see if they can follow him for home care. At first they said no. Then they called back and said Dr. Lobo Craig will cover him. Talked with patient-explained to him that I couldn't get a home care today-he wants to go home. Needs cab-set up [employer].

## 2022-08-10 NOTE — PROGRESS NOTES
CLINICAL PHARMACY NOTE: MEDS TO BEDS    Total # of Prescriptions Filled: 5   The following medications were delivered to the patient:  ZYVOX 600  DOXY 100  ACIDOPHILUS   VIT B 100   NORCO     Additional Documentation:   NURSE PAID FOR PTS MEDS HE DIDN'T HAVE ANY MONEY ON HIM

## 2022-08-10 NOTE — PROGRESS NOTES
[facility] -   Infectious diseases evaluation  admission date 8/8/2022    reason for consultation:   R foot diab infection    Impression :   Current:  R diab foot infection w maggots  R foot cellulitis  Periph vasc disease both legs - claudication  Axill bifem bypass 6/22/22  Arotic runoff now shows Bilateral trifurcation vessels are very petit but can be followed to the ankle. Other:  Hyponatremia  Discussion / summary of stay / plan of care   CRP 4.5  Recommendations   Stop  vanco to spare the kidney at this time. Zosyn -zyvox. MRI R foot w OM of the 2nd toe -   Podiatry to review if he ll benefit from removing the remaining stump - disc w Dr Kamini Trevino prefer conservative management for fear of poor healing  Will DC on 1 week zyvox followed by doxy po 4 weeks () no cx available- avoid levaquin due to poor atherosclerosis)  FU office in 3 weeks        Infection Control Recommendations   [place] Precautions  Contact Isolation       Antimicrobial Stewardship Recommendations   Simplification of therapy  Targeted therapy      History of Present Illness:   Initial history:  Clarisse Abdalla is a 59y.o.-year-old male w pain R foot and presented after not feeling good and R foot noticed w maggots. Tender R foot and small crack between th toes but no deep drainage - foot very tender to touch in the forefoot area. R foot is a little edematous. Does not walk enough due to pain in calves  Ox 3 and gave hx  Past issues w left foot and post TMA  R foot 2nd toe past tip resection and still is open and not well healed. Interval changes  8/10/2022   Patient Vitals for the past 8 hrs:   BP Temp Temp src Pulse Resp SpO2   08/10/22 0345 132/79 97.3 °F (36.3 °C) Oral 58 20 98 %     -No acute events overnight  -No new complaints-leg pain is controlled with pain meds.   -remained hemodynamically stable and afebrile  -MRI shows OM second toe  -CT AP runoff showed significant atherosclerosis in pelvis and lower extremity vessels. But ax bifem bypass done in 6/22 is patent. Narrow lower leg arterial run off      Summary of relevant labs:  Labs:  W 8-7.4-6.5  Creat 0.37-0.38- 0.48  Liver enz normal    Micro:  BC 8/8 pend    Imaging:    MRI Foot 8/9    Impression   Acute osteomyelitis of the 2nd proximal phalangeal shaft. The 2nd toe has   been amputated at the level of the proximal phalangeal neck. The proximal   phalangeal neck appears exposed to the skin surface. Adjacent subcutaneous   edema likely reflects cellulitis. No defined abscess identified. CTAP 8/8    Very suspicious right upper lobe pulmonary nodule. also has a medial right lower lobe pulmonary nodule  Mediastinal adenopathy  Significant atherosclerosis cyst of infrarenal abdominal aorta with occlusion of right EIA and left IIA. Right IIA and left EIA are extremely narrowed with partial occlusion of the left EIA reconstituted at the top of the femoral canal  Occlusion of superficial femoral arteries with reconstitution in the popliteal canal  Patent right axillary femoral bypass as well as femorofemoral bypass  Enlarged bilateral inguinal lymph nodes which may be incidental to the patient's bypass graft. Cystic structure in the left adnexa-possibly seroma insulin to prior vascular surgery    JIMBO 8/9    - R-0.37, L- 0.48    X-ray right foot 8/8    Impression   1. Soft tissue irregularity involving the skin between the 3rd and 4th toes   without convincing evidence for distant soft tissue gas. No underlying bone   loss identified. 2.  Interval amputation of the distal 2nd toe. Unchanged appearance of   distal great toe amputation. MRI R foot 8/9  Acute osteomyelitis of the 2nd proximal phalangeal shaft. The 2nd toe has   been amputated at the level of the proximal phalangeal neck. The proximal   phalangeal neck appears exposed to the skin surface.   Adjacent subcutaneous   edema likely reflects cellulitis. I have personally reviewed the past medical history, past surgical history, medications, social history, and family history, and I haveupdated the database accordingly. Allergies:   Shellfish-derived products     Review of Systems:     Review of Systems   Constitutional:  Positive for activity change (claudication). HENT:  Negative for congestion. Eyes:  Negative for discharge. Respiratory:  Negative for apnea. Cardiovascular:  Negative for chest pain. Gastrointestinal:  Negative for abdominal pain. Endocrine: Negative for cold intolerance and polydipsia. Genitourinary:  Negative for dysuria. Musculoskeletal:  Negative for arthralgias. Skin:  Positive for color change and wound. Allergic/Immunologic: Negative for immunocompromised state. Neurological:  Positive for numbness. Negative for dizziness and light-headedness. Hematological:  Negative for adenopathy. Psychiatric/Behavioral:  Negative for agitation. Physical Examination :       Physical Exam  Constitutional:       Appearance: Normal appearance. He is not ill-appearing. HENT:      Head: Normocephalic and atraumatic. Nose: Nose normal.      Mouth/Throat:      Mouth: Mucous membranes are moist.   Eyes:      General: No scleral icterus. Conjunctiva/sclera: Conjunctivae normal.   Cardiovascular:      Rate and Rhythm: Normal rate and regular rhythm. Heart sounds: Normal heart sounds. No murmur heard. Pulmonary:      Effort: No respiratory distress. Breath sounds: Normal breath sounds. Abdominal:      General: There is no distension. Palpations: Abdomen is soft. Tenderness: There is no abdominal tenderness. Genitourinary:     Comments: No esparza  Musculoskeletal:         General: No swelling, deformity or signs of injury. Cervical back: Neck supple. No rigidity. Right lower leg: Edema present. Skin:     Coloration: Skin is not jaundiced. Findings: Erythema (R foot) and lesion present. No bruising. Neurological:      Mental Status: He is alert and oriented to person, place, and time. Cranial Nerves: No cranial nerve deficit. Psychiatric:         Mood and Affect: Mood normal.         Thought Content: Thought content normal.       Past Medical History:     Past Medical History:   Diagnosis Date    Alcohol abuse     CAD (coronary artery disease)     Convulsions ([address]     Frostbite     Hypertension        Past Surgical  History:     Past Surgical History:   Procedure Laterality Date    ABDOMINAL AORTIC ANEURYSM REPAIR Bilateral 6/23/2022    AX-FEM, FEM-FEM BYPASS, X2 GORE PROPATEN GRAFTS 8MM X 80CM, X2 0.8CM X 8CM VASCUGUARD PATCH ** CELL SAVER** performed by Cory Marie MD at [address] [place] [address] Rd      left foot       Medications:      rivaroxaban  20 mg Oral Daily with breakfast    sodium hypochlorite   Irrigation Daily    lisinopril  10 mg Oral Daily    linezolid  600 mg Oral 2 times per day    thiamine  100 mg Oral Daily    piperacillin-tazobactam  3,375 mg IntraVENous Q8H    aspirin  81 mg Oral Daily    folic acid  1 mg Oral Daily    gabapentin  300 mg Oral TID    pantoprazole  40 mg Oral BID AC    atorvastatin  40 mg Oral Nightly    sodium chloride flush  5-40 mL IntraVENous 2 times per day       Social History:     Social History     Socioeconomic History    Marital status:      Spouse name: Not on file    Number of children: Not on file    Years of education: Not on file    Highest education level: Not on file   Occupational History    Not on file   Tobacco Use    Smoking status: Every Day     Packs/day: 1.00     Types: Cigarettes    Smokeless tobacco: Never   Vaping Use    Vaping Use: Not on file   Substance and Sexual Activity    Alcohol use:  Yes     Alcohol/week: 12.0 standard drinks     Types: 12 Cans of beer per week     Comment: approx 12 pack daily    Drug use: No    Sexual activity: Not on file   Other discussed the care of the patient, including pertinent history and exam findings,  with the resident. I have seen and examined the patient and the key elements of all parts of the encounter have been performed by me. I agree with the assessment, plan and orders as documented by the resident.     Cate Thorne, Infectious Diseases

## 2022-08-10 NOTE — PROGRESS NOTES
Occupational Therapy  Fac[facility]/Department: [facility]  Occupational Therapy Initial Assessment    Name: Shawna Farris  : 1965  MRN: [id number]  Date of Service: 8/10/2022    Discharge Recommendations:   Patient would benefit from continued therapy after discharge  OT Equipment Recommendations  Equipment Needed: No       Patient Diagnosis(es): The encounter diagnosis was Infected wound. Past Medical History:  has a past medical history of Alcohol abuse, CAD (coronary artery disease), Convulsions ([address] Frostbite, and Hypertension. Past Surgical History:  has a past surgical history that includes Toe amputation and Abdominal aortic aneurysm repair (Bilateral, 2022). Assessment   Performance deficits / Impairments: Decreased functional mobility ; Decreased ADL status; Decreased endurance;Decreased balance;Decreased high-level IADLs  Prognosis: Good  Decision Making: Medium Complexity  REQUIRES OT FOLLOW-UP: Yes  Activity Tolerance  Activity Tolerance: Patient Tolerated treatment well;Patient limited by pain        Plan   Plan  Times per Week: 2-4x  Current Treatment Recommendations: Balance training, Functional mobility training, Endurance training, Gait training, Pain management, Safety education & training, Patient/Caregiver education & training, Self-Care / ADL, Home management training     Restrictions  Restrictions/Precautions  Restrictions/Precautions: Fall Risk, General Precautions, Up as Tolerated, Weight Bearing  Lower Extremity Weight Bearing Restrictions  Right Lower Extremity Weight Bearing:  (heel WB)    Subjective   General  Patient assessed for rehabilitation services?: Yes  Family / Caregiver Present: No  Diagnosis: R foot infesstation, R foot cellulitis, R distal 2nd toe amp 2022  Subjective  Subjective: pt reported 8/10 chronic pain in RLE at start of session     Social/Functional History  Social/Functional History  Lives With: Alone  Type of Home: Apartment  Home Layout: One level  Home Access: Stairs to enter with rails  Entrance Stairs - Number of Steps: 4  Entrance Stairs - Rails: Left  Bathroom Shower/Tub: Tub/Shower unit  Bathroom Toilet: Standard  Bathroom Accessibility: Accessible  Home Equipment: Cane  Has the patient had two or more falls in the past year or any fall with injury in the past year?: No  Receives Help From: Family (sister Faith Us)  ADL Assistance: Independent  Homemaking Assistance: Independent  Homemaking Responsibilities: Yes  Ambulation Assistance: Needs assistance (pt reports use of cane prior to admission)  Transfer Assistance: Independent  Active : No  Patient's  Info: sister or bus  Education: 12th grade  Occupation: On disability  Leisure & Hobbies: going to [place]  Additional Comments: Pt reports sister and friends live close, able to assist when needed       Objective   SpO2: 97 %  O2 Device: None (Room air)             Safety Devices  Type of Devices: Call light within reach;Gait belt;Patient at risk for falls; Left in bed;Nurse notified  Restraints  Restraints Initially in Place: No  Bed Mobility Training  Bed Mobility Training: Yes  Supine to Sit: Supervision  Scooting: Stand-by assistance  Balance  Sitting: Intact (Pt sat unsupported EOB at SUP ~8 mins.)  Standing: Impaired (Pt stood using RW at CGA ~6 mins, no reports of dizziness or LOB, maintained RLE heel WB status.)  Transfer Training  Transfer Training: Yes  Sit to Stand: Stand-by assistance  Stand to Sit: Stand-by assistance  Gait  Overall Level of Assistance: Contact-guard assistance (Pt demo'd func mob around room at [facility], no reports of dizziness or LOB, no rest breaks required. Pt maintained RLE heel WB status with 1 cue.)     AROM: Within functional limits (BUEs hands, elbows, shoulders AROM WFL)  PROM: Within functional limits  Strength:  Within functional limits (BUEs hands 5/5, elbows 5/5, shoulders 5/5)  Coordination: Within functional limits  Tone: Normal  Sensation: Impaired (Pt reported chronic numbness in RLE)  ADL  Feeding: Setup; Independent; Increased time to complete  Grooming: Stand by assistance;Setup; Increased time to complete  UE Bathing: Stand by assistance;Setup; Increased time to complete  LE Bathing: Contact guard assistance;Setup; Increased time to complete  UE Dressing: Stand by assistance;Setup; Increased time to complete  LE Dressing: Contact guard assistance;Setup; Increased time to complete  Toileting: Contact guard assistance  Additional Comments: Pt began session supine in bed. Pt sat in bed, demo'd donning R/L socks at SUP bringing feet up on bed. Pt demo'd func mob using RW, maintaining RLE heel WB status at [facility]. Pt stood sinkside demo'd oral hygiene using BUEs to open/close toothpaste. Pt ended session seated on EOB eating breakfast independently with set up. Vision  Vision: Impaired  Vision Exceptions: Wears glasses for distance (glasses not present)  Hearing  Hearing: Within functional limits      Cognition  Overall Cognitive Status: WFL  Orientation  Overall Orientation Status: Within Functional Limits  Orientation Level: Oriented X4                  Education Given To: Patient  Education Provided: Role of Therapy;Plan of Care;Transfer Training; Fall Prevention Strategies  Education Provided Comments: ePt educated on tripping hazards  Education Method: Demonstration;Verbal  Barriers to Learning: None  Education Outcome: Verbalized understanding;Demonstrated understanding  LUE AROM (degrees)  LUE AROM : WFL (L elbow and shoulder AROM WFL)  Left Hand AROM (degrees)  Left Hand AROM: WFL  RUE AROM (degrees)  RUE AROM : WFL (R elbow and shoulder AROM WFL)  Right Hand AROM (degrees)  Right Hand AROM: [facility]                 AM-PAC Score        AM-[facility] Inpatient Daily Activity Raw Score: 19 (08/10/22 0907)  AM-PAC Inpatient ADL T-Scale Score : 40.22 (08/10/22 0907)  ADL Inpatient CMS 0-100% Score: 42.8 (08/10/22 1052)  ADL Inpatient CMS G-Code Modifier : CK (08/10/22 8936)         Goals  Short Term Goals  Time Frame for Short term goals: pt will by discharge  Short Term Goal 1: demo UB bathing/dressing independently. Short Term Goal 2: demo LB bathing/dressing at mod I, AE PRN. Short Term Goal 3: demo dynamic standing during func activity for 10+ mins at SUP, LRD PRN, no rest break. Short Term Goal 4: demo good safety awareness during func mob with ADL tasks at SUP, LRD PRN. Short Term Goal 5: adhere to WB restrictions during func activity independently, with no cues.        Therapy Time   Individual Concurrent Group Co-treatment   Time In 8127         Time Out 0813         Minutes 23         Timed Code Treatment Minutes: 6400 [address] Pena S/OT

## 2022-08-10 NOTE — DISCHARGE INSTR - COC
Continuity of Care Form    Patient Name: Clare Santiago   :  1965  MRN:  [id number]    Admit date:  2022  Discharge date:  8/10/2022    Code Status Order: Full Code   Advance Directives:     Admitting Physician:  Traci Scott MD  PCP: No primary care provider on file. [facility]  Discharging Unit Phone Number: [phone]    Emergency Contact:   Extended Emergency Contact Information  Primary Emergency Contact: Stephanie Gonzalez  Address: [address] [place] [address], [address] [place] [address]  Home Phone: [phone]  Relation: Brother/Sister    Past Surgical History:  Past Surgical History:   Procedure Laterality Date    ABDOMINAL AORTIC ANEURYSM REPAIR Bilateral 2022    AX-FEM, FEM-FEM BYPASS, X2 GORE PROPATEN GRAFTS 8MM X 80CM, X2 0.8CM X 8CM VASCUGUARD PATCH ** CELL SAVER** performed by Anuel Hubbard MD at [address] [place] [address]      left foot       Immunization History:   Immunization History   Administered Date(s) Administered    COVID-19, J&J, (age 18y+), IM, 0.5 mL 2021       Active Problems:  Patient Active Problem List   Diagnosis Code    Frostbite of foot T33.829A    Frostbite with tissue necrosis T34.90XA    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 8XXA, L08.9    Cellulitis of toe L03.039    Type 2 diabetes mellitus with right diabetic foot infection (HCC) E11.628, L08.9    Cellulitis of right foot L03.115       Isolation/Infection:   Isolation            No Isolation          Patient Infection Status       None to display            Nurse Assessment:  Last Vital Signs: /74   Pulse 58   Temp 97.5 °F (36.4 °C) (Oral)   Resp 18   Ht 5' 8\" (1.727 m)   Wt 144 lb 6.4 oz (65.5 kg)   SpO2 97%   BMI 21.96 kg/m²     Last documented pain score (0-10 scale): Pain Level: 7  Last Weight:   Wt Readings from Last 1 Encounters:   08/08/22 144 lb 6.4 oz (65.5 kg)     Mental Status:  oriented, alert, and able to concentrate and follow conversation    IV Access:  - None    Nursing Mobility/ADLs:  Walking   Assisted  Transfer  Independent  Bathing  Independent  Dressing  Independent  Toileting  Independent  Feeding  Independent  Med Admin  Assisted  Med Delivery   whole    Wound Care Documentation and Therapy:  Wound 06/20/22 Foot Anterior;Right (Active)   Wound Etiology Traumatic 08/10/22 0400   Dressing Status Clean;Dry; Intact 08/10/22 0400   Wound Cleansed Irrigated with saline 08/09/22 1600   Dressing/Treatment Roll gauze;Dry dressing; Ace wrap 08/10/22 0400   Dressing Change Due 08/10/22 08/10/22 0400   Wound Assessment Bleeding;Exposed structure bone;Fibrinous 08/10/22 0400   Drainage Amount Moderate 08/09/22 1600   Drainage Description Serosanguinous 08/09/22 1600   Odor Mild 08/10/22 0400   Lovely-wound Assessment Other (Comment) 08/10/22 0400   Number of days: 50       Wound 08/08/22 Toe (Comment  which one) Anterior;Right (Active)   Wound Etiology Traumatic 08/10/22 0400   Dressing Status Clean;Dry; Intact 08/10/22 0400   Wound Cleansed Irrigated with saline 08/09/22 1600   Dressing/Treatment Iodoform gauze; Ace wrap;Roll gauze 08/10/22 0400   Dressing Change Due 08/10/22 08/10/22 0400   Drainage Amount Moderate 08/09/22 1600   Drainage Description Serosanguinous 08/09/22 1600   Odor Mild 08/09/22 1600   Number of days: 2       Incision 06/23/22 Femoral Anterior;Proximal;Right (Active)   Number of days: 48       Incision 06/23/22 Femoral Anterior;Left;Proximal (Active)   Number of days: 48       Incision 06/23/22 Chest Upper;Right; Anterior (Active)   Number of days: 47       Incision 06/23/22 Abdomen Mid;Medial (Active)   Incision Cleansed Not Cleansed 08/10/22 0400   Dressing/Treatment Open to air 08/10/22 0400   Closure Staples 08/09/22 2000   Margins Approximated 08/09/22 2000   Incision Assessment Dry;Erythema 08/10/22 0400   Drainage Amount None 08/10/22 0400   Odor None 08/10/22 0400   Lovely-incision Assessment Intact 08/10/22 0400   Number of days: 47        Elimination:  Continence: Bowel: Yes  Bladder: Yes  Urinary Catheter: None   Colostomy/Ileostomy/Ileal Conduit: No       Date of Last BM: 8/10/2022    Intake/Output Summary (Last 24 hours) at 8/10/2022 1321  Last data filed at 8/10/2022 0756  Gross per 24 hour   Intake --   Output 1525 ml   Net -1525 ml     I/O last 3 completed shifts: In: 2204.9 [P.O.:800; I.V.:1154.9; IV Piggyback:250]  Out: 3189 [Urine:2475]    Safety Concerns: At Risk for Falls    Impairments/Disabilities:      Amputation - toes on left foot, part of big toe and entire second toe on right foot    Nutrition Therapy:  Current Nutrition Therapy:   - Oral Diet:  General, Low Fat, and Low Cholesterol / High Fiber, No Added Salt    Routes of Feeding: Oral  Liquids: No Restrictions  Daily Fluid Restriction: no  Last Modified Barium Swallow with Video (Video Swallowing Test): not done    Treatments at the Time of Hospital Discharge:   Respiratory Treatments: N/A  Oxygen Therapy:  is not on home oxygen therapy.   Ventilator:    - No ventilator support    Rehab Therapies: Physical Therapy and Occupational Therapy  Weight Bearing Status/Restrictions: No weight bearing restrictions  Other Medical Equipment (for information only, NOT a DME order):  walker  Other Treatments: N/A    Patient's personal belongings (please select all that are sent with patient):  None    RN SIGNATURE:  Electronically signed by Lesly Eng RN on 8/10/22 at 4:13 PM EDT    CASE MANAGEMENT/SOCIAL WORK SECTION    Inpatient Status Date: ***    Readmission Risk Assessment Score:  Readmission Risk              Risk of Unplanned Readmission:  [id number]           Discharging to Facility/ Agency   Name: Address:  Phone:  Fax:    Dialysis Facility (if applicable)   Name:  Address:  Dialysis Schedule:  Phone:  Fax:    / signature: {Esignature:[id number]}    PHYSICIAN SECTION    Prognosis: Fair    Condition at Discharge: Stable    Rehab Potential (if transferring to Rehab): Fair    Recommended Labs or Other Treatments After Discharge: Patient will need HHC and daily dressing changes consisting of Dakins, wet to dry, DSD, tape. Change daily. Physician Certification: I certify the above information and transfer of Clare Santiago  is necessary for the continuing treatment of the diagnosis listed and that he requires [address] for greater 30 days.      Update Admission H&P: No change in H&P    PHYSICIAN SIGNATURE:  Electronically signed by Alessandro Neely MD on 8/10/22 at 4:28 PM EDT

## 2022-08-10 NOTE — PROGRESS NOTES
Division of Vascular Surgery             Progress Note      Name: Shawna Farris  MRN: [id number]         Overnight Events:   No acute event overnight      Subjective:   Pt is seen and examined this morning. Afebrile and no acute event. Wound dress was changed this morning. Patient is still having mild resting pain on his right foot, but intact motor and sensory. B/l doppler signal on PT and DP. Physical Exam:     Vitals:  BP (!) 157/82   Pulse 57   Temp 97.5 °F (36.4 °C) (Oral)   Resp 20   Ht 5' 8\" (1.727 m)   Wt 144 lb 6.4 oz (65.5 kg)   SpO2 97%   BMI 21.96 kg/m²       HENT:     Head: Normocephalic. Right Ear: External ear normal.     Left Ear: External ear normal.     Nose: Nose normal.     Mouth/Throat:     Mouth: Mucous membranes are moist.  Eyes:     Pupils: Pupils are equal, round, and reactive to light. Cardiovascular:     Rate and Rhythm: Normal rate and regular rhythm. Pulmonary:     Effort: Pulmonary effort is normal.     Breath sounds: Normal breath sounds. Abdominal:     General: Abdomen is flat. Palpations: Abdomen is soft. Musculoskeletal:         General: Normal range of motion. Cervical back: Normal range of motion. Comments: Left leg all toes are amputated, multiple dry red scabs throughout the LLE. Erythematous, edematous and swollen RLE. 1st and 2nd right toes are partially removed. 2nd right toe oozing with blood and mucus with open fresh wound. Maggots located at the gap between 1-2nd, 3-4th and 4-5th toes, and inside the 2nd toe. Number of maggots have reduced, washed out by nursing staff and podiatry team.  Skin:     Capillary Refill: Capillary refill takes 2 to 3 seconds. Findings: Erythema, lesion and rash present. Neurological:     Mental Status: He is alert and oriented to person, place, and time.   Psychiatric:         Mood and Affect: Mood normal.         Behavior: Behavior normal.  Data:  CBC:   Recent Labs     08/08/22  1300 08/09/22  0511 08/10/22  0452   WBC 8.3 7.4 6.5   HGB 14.0 12.2* 11.7*    231 226     Chemistry:   Recent Labs     08/08/22  1300 08/09/22  0511 08/10/22  0452   * 130* 131*   K 3.9 3.7 4.1   CL 87* 96* 100   CO2 23 23 21   GLUCOSE 89 83 116*   BUN 2* 3* 3*   CREATININE 0.37* 0.38* 0.48*   ANIONGAP 16 11 10   LABGLOM >60 >60 >60   GFRAA >60 >60 >60   CALCIUM 8.8 7.9* 8.0*   LACTACIDWB 2.2* 0.9  --      Hepatic:   Recent Labs     08/08/22  1300   AST 26   ALT 8   ALKPHOS 104   BILITOT 0.25*     Coagulation:   Recent Labs     08/08/22  1300   APTT 31.1*   PROT 7.9   INR 0.9         Radiology Review:    XR FOOT RIGHT (MIN 3 VIEWS)    Result Date: 8/8/2022  1. Soft tissue irregularity involving the skin between the 3rd and 4th toes without convincing evidence for distant soft tissue gas. No underlying bone loss identified. 2.  Interval amputation of the distal 2nd toe. Unchanged appearance of distal great toe amputation. CT FOOT RIGHT WO CONTRAST    Result Date: 8/9/2022  1. Status post partial 2nd toe amputation with questionable erosive changes at the distal amputation margin of the proximal phalanx possibly representing osteomyelitis. 2. Deep soft tissue defect of the distal aspect of the 2nd toe with extensive subcutaneous edema compatible with cellulitis. No soft tissue gas or drainable fluid collection identified. MRI FOOT RIGHT W WO CONTRAST    Result Date: 8/9/2022  Acute osteomyelitis of the 2nd proximal phalangeal shaft. The 2nd toe has been amputated at the level of the proximal phalangeal neck. The proximal phalangeal neck appears exposed to the skin surface. Adjacent subcutaneous edema likely reflects cellulitis. No defined abscess identified. CTA CHEST ABDOMEN AORTA RUNOFF RECON    Result Date: 8/8/2022  1. Within the chest, no aortic aneurysm or dissection. 2.  Patient has a very suspicious right upper lobe pulmonary nodule. Patient also has a medial right lower lobe pulmonary nodule. 3.  Mediastinal adenopathy. 4.  Within the abdomen, the patient has fatty infiltration of the liver and other findings as above. Atherosclerotic calcification of the abdominal aorta is noted without aneurysm or dissection. Full description above. 5.  Within the pelvis, the patient has significant atherosclerotic disease of the infrarenal abdominal aorta and iliac arteries. Occlusion of the right external and left internal iliac arteries is noted. Right internal and left external iliac arteries are extremely narrowed with partial occlusion of the left external iliac artery reconstituted at the top of the femoral canal. 6.  The patient has occlusion of the superficial femoral arteries in each lower extremity with reconstitution in the popliteal canal.  Bilateral trifurcation vessels are very petit but can be followed to the ankle. 7.  Patient has right axillary femoral bypass as well as a fem-fem bypass which appear patent. 8.  Enlarged bilateral inguinal lymph nodes new since prior study which may be incident to the patient's bypass grafts. The patient has a cystic structure in the left adnexal area possibly a seroma incident to prior vascular surgery. Critical results were called by Dr. Vidal Aiken MD to Dr. Jeremiah Desir on 8/8/2022 at 18:13. RECOMMENDATIONS: The patient's right lung nodules require further follow-up and/or assessment. Please see below. Consideration may be given to tissue sampling of the dominant nodule in the right apex due to is highly irregular characteristics. Fleischner Society guidelines for follow-up and management of incidentally detected pulmonary nodules: Multiple Solid Nodules: Nodule size greater than 8 mm In a low-risk patient, CT at 3-6 months, then consider CT at 18-24 months. In a high-risk patient, CT at 3-6 months, then CT at 18-24 months. - Low risk patients include individuals with minimal or absent history of smoking and other known risk factors.  - High risk patients include individuals with a history or smoking or known risk factors. Radiology 2017 http://pubs. rsna.org/doi/full/10.1148/radiol. 7927296293            Assessment:     59 y.o. man with hx of leg toes amputation, right toes partially removed, syjjuypx-jg-doirbvt bypass 06/2022 presents for right leg pain and right lower leg maggots. Procedure in 06/2022:  1. Bilateral common femoral endarterectomy with bovine pericardial patch angioplasty. 2.  Right profunda endarterectomy with bovine pericardial patch angioplasty. 3.  Right axillary to right femoral bypass with 8 mm ringed PTFE. 4.  Right common femoral to left common femoral bypass with 8 mm ringed PTFE. 5.  Mini exploratory laparotomy to investigate the proximal infrarenal aorta. Plan:   discussed patient, history and physical with attending  CT ruled out abscess   JIMBO of LE: L 0.47 R 0.36 compared to R 0.4 in 06/2022  Daily wound dressing changes.  Iodoform strip applied into his second toe  Podiatry is consulted  ID is consulted          [address] [facility]  Electronically signed by John Sherman DO  on 8/10/2022 at 8:09 AM

## 2022-08-10 NOTE — PROGRESS NOTES
Progress Note  Podiatric Medicine and Surgery     Subjective     CC: Maggot infestation to right foot, pain to right second toe    Patient seen and examined at bedside. No acute events overnight. Afebrile, vital signs stable   Patient states that he is feeling good. He says someone performed dressing changes this morning that was not from podiatry. HPI :  Mina Mistry is a 59 y.o. male seen at [facility]'s ED for leg infection to his right forefoot. Patient states that he noticed these a few days ago for which he was pouring peroxide on trying to clean it out but it did not work. Patient states that he had recent amputation at [address] [place] [address] last month. Patient states that he had a vascular surgery for an aortic abdominal aneurysm repair in June for which she has staples down his midline. Patient states that he was supposed to follow-up with vascular for his foot and his podiatrist but did not. Patient denies fever, chills, diarrhea, vomiting. Patient states that pain is an 8 out of 10 and states that his foot aches when it is up but feels better when it is a in a hanging position. ROS: Denies N/V/F/C/SOB/CP. Otherwise negative except at stated in the HPI.      Medications:  Scheduled Meds:   amLODIPine  5 mg Oral Daily    rivaroxaban  20 mg Oral Daily with breakfast    sodium hypochlorite   Irrigation Daily    lisinopril  10 mg Oral Daily    linezolid  600 mg Oral 2 times per day    thiamine  100 mg Oral Daily    piperacillin-tazobactam  3,375 mg IntraVENous Q8H    aspirin  81 mg Oral Daily    folic acid  1 mg Oral Daily    gabapentin  300 mg Oral TID    pantoprazole  40 mg Oral BID AC    atorvastatin  40 mg Oral Nightly    sodium chloride flush  5-40 mL IntraVENous 2 times per day       Continuous Infusions:   sodium chloride         PRN Meds:HYDROcodone 5 mg - acetaminophen, HYDROmorphone, sodium chloride flush, sodium chloride flush, sodium chloride, ondansetron **OR** ondansetron, polyethylene glycol, acetaminophen **OR** acetaminophen, potassium chloride **OR** potassium alternative oral replacement **OR** potassium chloride, magnesium sulfate, labetalol    Objective     Vitals:  Patient Vitals for the past 8 hrs:   BP Temp Temp src Pulse Resp SpO2   08/10/22 1457 -- -- -- -- 18 --   08/10/22 0915 133/74 -- -- 58 18 --   08/10/22 0756 (!) 157/82 97.5 °F (36.4 °C) Oral 57 20 97 %     Average, Min, and Max for last 24 hours Vitals:  TEMPERATURE:  Temp  Av.7 °F (36.5 °C)  Min: 97.3 °F (36.3 °C)  Max: 97.9 °F (36.6 °C)    RESPIRATIONS RANGE: Resp  Av  Min: 18  Max: 20    PULSE RANGE: Pulse  Av.6  Min: 57  Max: 87    BLOOD PRESSURE RANGE:  Systolic (82RIJ), VKR:908 , Min:132 , IFM:445   ; Diastolic (38VVO), AHM:25, Min:74, Max:97      PULSE OXIMETRY RANGE: SpO2  Av.8 %  Min: 97 %  Max: 99 %    I/O last 3 completed shifts: In: 2204.9 [P.O.:800; I.V.:1154.9; IV Piggyback:250]  Out: 2528 [Urine:2475]    CBC:  Recent Labs     22  1300 22  0511 08/10/22  0452   WBC 8.3 7.4 6.5   HGB 14.0 12.2* 11.7*   HCT 40.1* 36.5* 34.2*    231 226   CRP 4.5  --   --         BMP:  Recent Labs     22  1300 22  0511 08/10/22  0452   * 130* 131*   K 3.9 3.7 4.1   CL 87* 96* 100   CO2 23 23 21   BUN 2* 3* 3*   CREATININE 0.37* 0.38* 0.48*   GLUCOSE 89 83 116*   CALCIUM 8.8 7.9* 8.0*        Coags:  Recent Labs     22  1300   APTT 31.1*   PROT 7.9   INR 0.9       Lab Results   Component Value Date    SEDRATE 53 (H) 2022     Recent Labs     22  1300   CRP 4.5       Lower Extremity Physical Exam:  Vascular: DP and PT pulses are non-palpable. Audible per Doppler. CFT <3 seconds to all digits. Hair growth is absent to the level of the digits. Moderate edema. Neuro: Saph/sural/SP/DP/plantar sensation intact to light touch. Musculoskeletal: Muscle strength is 4/5 to all lower extremity muscle groups. Gross deformity is now noticeable clinically. Dermatologic: Full thickness ulcer #1 located distal aspect second digit and measures approximately 1 cm x by 1 cm x by 1.5 cm. Base is granular. Periwound skin decreased erythema. No maggots found on foot. Serosanguineous drainage erythema to the distal forefoot with associated increase in warmth. Does probe to bone and does undermine. No fluctuance, crepitus, or induration. There is a superficial wound to the distal aspect of the dorsal forefoot just proximal to the levels of the metatarsophalangeal joint. Wound is a granular base. The wound is 10 cm x 4.5 cm by superficial.  No malodor     Interdigital maceration present with maggots infestation. Clinical Images:        Imaging:   CT FOOT RIGHT WO CONTRAST   Final Result   1. Status post partial 2nd toe amputation with questionable erosive changes   at the distal amputation margin of the proximal phalanx possibly representing   osteomyelitis. 2. Deep soft tissue defect of the distal aspect of the 2nd toe with extensive   subcutaneous edema compatible with cellulitis. No soft tissue gas or   drainable fluid collection identified. MRI FOOT RIGHT W WO CONTRAST   Final Result   Acute osteomyelitis of the 2nd proximal phalangeal shaft. The 2nd toe has   been amputated at the level of the proximal phalangeal neck. The proximal   phalangeal neck appears exposed to the skin surface. Adjacent subcutaneous   edema likely reflects cellulitis. No defined abscess identified. VL JIMBO BILATERAL LIMITED 1-2 LEVELS   Final Result      XR FOOT RIGHT (MIN 3 VIEWS)   Final Result   1. Soft tissue irregularity involving the skin between the 3rd and 4th toes   without convincing evidence for distant soft tissue gas. No underlying bone   loss identified. 2.  Interval amputation of the distal 2nd toe. Unchanged appearance of   distal great toe amputation. Cultures: Still pending      Assessment   Jeffrey Campbell is a 59 y.o. male with   Maggot infestation, right foot,  Osteomyelitis right second digit right foot  Cellulitis right foot  Right foot    Principal Problem:    Infestation, maggot  Active Problems:    PVD (peripheral vascular disease) ([facility])    Infected wound    Cellulitis of toe    Type 2 diabetes mellitus with right diabetic foot infection ([facility] [address]    Cellulitis of right foot  Resolved Problems:    * No resolved hospital problems. *       Plan     Patient examined and evaluated at bedside   Treatment options discussed in detail with the patient  Radiographs reviewed and discussed in detail with patient. Plain film radiographs show no evidence of cortical lysis to the distal aspect of the digits that would suggest osteomyelitis. No evidence of soft tissue gas. No evidence of soft tissue gas that tracks proximally. MRI right foot reviewed: possible reactive osteitis vs osteomyelitis to right second digit, second proximal phalangeal shaft with adjacent subcutaneous edema likely reflecting cellulitis. No defined abscess identified. No acute surgical intervention planned at this time on podiatry standpoint. Patient is stable for discharge. Follow up options such as wound care clinic recommended to patient. [facility] to do daily dressing changes consisting of Dakins wet to dry, Kerlix, tape. Dressing applied to Right foot: Iodine packing, DSD, lightly wrapped ace   Patient admitted for IV antibiotics due to cellulitis and maggot infestation  Continue vancomycin and linezolid, following ID recommendations  Appreciate vascular recommendations-JIMBO value,: L 0.47 R 0.36  Patient instructed to remain heel weightbearing only to right LE  Discussed with Dr. Cy Mendoza, TRISTAN   Podiatric Medicine & Surgery   8/10/2022 at 3:50 PM    Senior Resident Statement:  I have discussed the case, including pertinent history and exam findings with the resident. I agree with the assessment, plan and orders as documented by the intern.  Any changes were made in the note above.        Electronically signed by Gualberto Juarez DPM on 8/10/2022 at 4:51 PM

## 2022-08-10 NOTE — PROGRESS NOTES
Physical Therapy  Facility/Department: [facility]  Physical Therapy Initial Assessment    Name: Edith Mendoza  : 1965  MRN: [id number]  Date of Service: 8/10/2022    Chief Complaint   Patient presents with    Foot Pain     Right foot, patient states he has maggots eating away at foot, states had 1/2 of first and second toe amputated       Discharge Recommendations:    No further therapy required at discharge. PT Equipment Recommendations  Equipment Needed: No      Patient Diagnosis(es): The primary encounter diagnosis was Infected wound. A diagnosis of Osteomyelitis of right foot, unspecified type [facility]) was also pertinent to this visit. Past Medical History:  has a past medical history of Alcohol abuse, CAD (coronary artery disease), Convulsions ([address] Frostbite, and Hypertension. Past Surgical History:  has a past surgical history that includes Toe amputation and Abdominal aortic aneurysm repair (Bilateral, 2022). Assessment   Body Structures, Functions, Activity Limitations Requiring Skilled Therapeutic Intervention: Decreased functional mobility ; Decreased strength;Decreased endurance;Decreased balance  Assessment: Pt grossly CGA for mobility, amb 200' RW CGA. Pt would benefit from contined acute PT to address deficits.   Therapy Prognosis: Good  Decision Making: Medium Complexity  Requires PT Follow-Up: Yes  Activity Tolerance  Activity Tolerance: Patient tolerated treatment well     Plan   Plan  Plan: 3-5 times per week  Current Treatment Recommendations: Strengthening, Balance training, Gait training, Functional mobility training, Stair training, Transfer training, Home exercise program, Safety education & training, Patient/Caregiver education & training, Equipment evaluation, education, & procurement, Therapeutic activities  Safety Devices  Type of Devices: Call light within reach, Nurse notified, Gait belt, All fall risk precautions in place, Left in bed  Restraints  Restraints Initially in Place: No     Restrictions  Restrictions/Precautions  Restrictions/Precautions: Fall Risk, General Precautions, Up as Tolerated, Weight Bearing  Required Braces or Orthoses?: No  Lower Extremity Weight Bearing Restrictions  Right Lower Extremity Weight Bearing: Weight Bearing As Tolerated (Heel WB)  Position Activity Restriction  Other position/activity restrictions: Hx L TMA     Subjective   General  Chart Reviewed: Yes  Patient assessed for rehabilitation services?: Yes  Response To Previous Treatment: Not applicable  Family / Caregiver Present: No  Follows Commands: Within Functional Limits  General Comment  Comments: RN and pt agreeable to PT. Pt alert in bed upon arrival.  Subjective  Subjective: Pt reports 7/10 r foot \"ache\" and denies any numbness or tingling.          Social/Functional History  Social/Functional History  Lives With: Alone  Type of Home: Apartment (ground level)  Home Layout: One level  Home Access: Stairs to enter with rails  Entrance Stairs - Number of Steps: 4  Entrance Stairs - Rails: Left  Bathroom Shower/Tub: Tub/Shower unit  Bathroom Toilet: Standard  Bathroom Equipment:  (none)  Bathroom Accessibility: Accessible  Home Equipment: Cane  Has the patient had two or more falls in the past year or any fall with injury in the past year?: No  Receives Help From: Family, Friend(s) (Sister, friends hang out but would help if needed)  ADL Assistance: Independent  Homemaking Assistance: Independent  Homemaking Responsibilities: Yes (sister takes laundry to clean it)  Ambulation Assistance: Independent (cane use out of house)  Transfer Assistance: Independent  Active : No  Patient's  Info: bus  Education: 12th grade  Occupation: On [address] [place] [address]: going to [employer] and [facility]  Additional Comments: Pt reports sister and friends live close, able to assist when needed  Vision/Hearing  Vision  Vision: Impaired  Vision Exceptions: Wears glasses for reading (but they're cedric, does not have functioning pair)  Hearing  Hearing: Within functional limits    Cognition   Orientation  Overall Orientation Status: Within Functional Limits  Cognition  Overall Cognitive Status: WFL     Objective     AROM RLE (degrees)  RLE AROM: WFL  AROM LLE (degrees)  LLE AROM : WFL  AROM RUE (degrees)  RUE AROM : WFL  AROM LUE (degrees)  LUE AROM : WFL  Strength RLE  Strength RLE: WFL  Strength LLE  Strength LLE: WFL  Strength RUE  Strength RUE: WFL  Strength LUE  Strength LUE: WFL        Bed Mobility Training  Bed Mobility Training: Yes  Supine to Sit: Supervision  Scooting: Stand-by assistance  Balance  Sitting: Intact (Pt sat unsupported EOB at SUP ~8 mins.)  Standing: Impaired (Pt stood using RW at CGA ~6 mins, no reports of dizziness or LOB, maintained RLE heel WB status.)  Transfer Training  Transfer Training: Yes  Sit to Stand: Stand-by assistance  Stand to Sit: Stand-by assistance  Gait  Overall Level of Assistance: Contact-guard assistance (Pt demo'd func mob around room at [facility], no reports of dizziness or LOB, no rest breaks required. Pt maintained RLE heel WB status with 1 cue.)  Bed mobility  Bridging: Stand by assistance  Rolling to Left: Stand by assistance  Transfers  Sit to Stand: Contact guard assistance  Stand to sit: Contact guard assistance  Ambulation  Device: Rolling Walker  Assistance: Contact guard assistance  Quality of Gait: good chelsey, no LOB or buckling, reciprocal, ceus for heel WB RLE with uncertain compliance.   Distance: 200'  More Ambulation?: No  Stairs/Curb  Stairs?: No     Balance  Posture: Good  Sitting - Static: Good  Sitting - Dynamic: Good  Standing - Static: Good;-  Standing - Dynamic: Fair;+  Comments: RW used while assessing standing balance           AM-PAC Score  AM-PAC Inpatient Mobility Raw Score : 21 (08/10/22 1339)  AM-PAC Inpatient T-Scale Score : 50.25 (08/10/22 1339)  Mobility Inpatient CMS 0-100% Score: 28.97 (08/10/22 1339)  Mobility Inpatient CMS G-Code Modifier : CJ (08/10/22 1339)        Goals  Short Term Goals  Time Frame for Short term goals: 14 visits  Short term goal 1: Pt will be Dionisio bed mobility  Short term goal 2: Pt will be Dionisio transfers  Short term goal 3: Pt will be Dionisio amb 200' SPC  Short term goal 4: Pt will navigate 5 steps Dionisio L rail       Education  Patient Education  Education Given To: Patient  Education Provided: Role of Therapy;Plan of Care  Education Method: Demonstration;Verbal  Barriers to Learning: None  Education Outcome: Verbalized understanding;Demonstrated understanding      Therapy Time   Individual Concurrent Group Co-treatment   Time In 0939         Time Out 0958         Minutes 19         Timed Code Treatment Minutes: 10 Minutes       Chantel Weber, PT

## 2022-08-10 NOTE — PLAN OF CARE
Problem: Discharge Planning  Goal: Discharge to home or other facility with appropriate resources  Outcome: Completed     Problem: Pain  Goal: Verbalizes/displays adequate comfort level or baseline comfort level  Outcome: Completed     Problem: ABCDS Injury Assessment  Goal: Absence of physical injury  Outcome: Completed     Problem: Safety - Adult  Goal: Free from fall injury  Outcome: Completed     Problem: Skin/Tissue Integrity - Adult  Goal: Incisions, wounds, or drain sites healing without S/S of infection  Outcome: Completed     Problem: Musculoskeletal - Adult  Goal: Return mobility to safest level of function  Outcome: Completed  Goal: Return ADL status to a safe level of function  Outcome: Completed     Problem: Infection - Adult  Goal: Absence of infection at discharge  Outcome: Completed     Problem: Metabolic/Fluid and Electrolytes - Adult  Goal: Electrolytes maintained within normal limits  Outcome: Completed  Goal: Hemodynamic stability and optimal renal function maintained  Outcome: Completed     Problem: Gastrointestinal - Adult  Goal: Minimal or absence of nausea and vomiting  Outcome: Completed  Goal: Maintains or returns to baseline bowel function  Outcome: Completed  Goal: Maintains adequate nutritional intake  Outcome: Completed     Problem: Chronic Conditions and Co-morbidities  Goal: Patient's chronic conditions and co-morbidity symptoms are monitored and maintained or improved  Outcome: Completed     Problem: Skin/Tissue Integrity  Goal: Absence of new skin breakdown  Description: 1. Monitor for areas of redness and/or skin breakdown  2. Assess vascular access sites hourly  3. Every 4-6 hours minimum:  Change oxygen saturation probe site  4. Every 4-6 hours:  If on nasal continuous positive airway pressure, respiratory therapy assess nares and determine need for appliance change or resting period.   Outcome: Completed

## 2022-08-13 LAB
CULTURE: NORMAL
CULTURE: NORMAL
Lab: NORMAL
Lab: NORMAL
SPECIMEN DESCRIPTION: NORMAL
SPECIMEN DESCRIPTION: NORMAL

## 2022-08-17 NOTE — DISCHARGE SUMMARY
[facility]     Department of Internal Medicine - Staff Internal Medicine Teaching Service    INPATIENT DISCHARGE SUMMARY      Patient Identification:  Rochelle Garcia is a 59 y.o. male. :  1965  MRN: [id number]     Acct: [de-identified]   PCP: No primary care provider on file. Admit Date:  2022  Discharge date and time: 8/10/2022  6:05 PM   Attending Provider: No att. providers found                                     [address] [facility] [address] Problem Lists:  Principal Problem:    Infestation, maggot  Active Problems:    PVD (peripheral vascular disease) ([facility]    Infected wound    Cellulitis of toe    Type 2 diabetes mellitus with right diabetic foot infection ([facility]    Cellulitis of right foot  Resolved Problems:    * No resolved hospital problems. [facility] STAY     Brief Inpatient course:     Rochelle Garcia is a 59 y.o. male who presents with maggots in the open wound on his right foot. Patient states that he has a history of his toes turning black, and the last month his toes turn black and 1 fell off. States that he soaks his foot and iodine daily and has recently noticed maggots growing on it. Patient is an alcoholic and states that he got turned around and never followed up with any of his doctors appointments. He also reports not having followed up with his vascular surgeon after femoral bypass over a month ago. Patient still has sutures in place. Denies any fevers, chills, nausea, vomiting. Notes that the pain was significantly worse over the last 2 days that he needed to call EMS. Patent is was seen by podiatry, ID, and vascular. CT was done and ruled out abscess  MRI was concerning for right foot with osteomyelitis of the second toe    Patient's wound was treated by podiatry, wound care was provided and recommended follow-up outpatient wound clinic with daily dressing changes consisting of Dakin's wet-to-dry and Kerlix tape.       Infectious disease was also consulted and treated with Zosyn and Zyvox. Recommended discharge on 4 weeks of Zyvox followed by doxycycline p.o. for 4 weeks. Vascular surgery also saw the patient no acute intervention was needed at the time. Procedures/ Significant Interventions:    MRI concerning for osteomyelitis of the second toe    Consults:     Consults:     Final Specialist Recommendations/Findings:   IP CONSULT TO PODIATRY  IP CONSULT TO VASCULAR SURGERY  IP CONSULT TO INTERNAL MEDICINE  IP CONSULT TO INFECTIOUS DISEASES  IP CONSULT TO CASE MANAGEMENT  IP CONSULT TO HOME CARE NEEDS  IP CONSULT TO HOME CARE NEEDS      Any Hospital Acquired Infections: none    Discharge Functional Status:  stable    DISCHARGE PLAN     Disposition: home    Patient Instructions:   Discharge Medication List as of 8/10/2022  4:35 PM        START taking these medications    Details   linezolid (ZYVOX) 600 MG tablet Take 1 tablet by mouth in the morning and 1 tablet before bedtime. Do all this for 14 days. Once andrea w this course on 8/25, start doxy po for 4 weeks. , Disp-28 tablet, R-0Normal      doxycycline hyclate (VIBRA-TABS) 100 MG tablet Take 1 tablet by mouth in the morning and 1 tablet before bedtime. Do all this for 28 days. Start 8/25 and keep 4 weeks - take w meals -., Disp-56 tablet, R-0Normal      lactobacillus (CULTURELLE) capsule Take 1 capsule by mouth in the morning., Disp-42 capsule, R-0Normal      thiamine 100 MG tablet Take 1 tablet by mouth in the morning., Disp-30 tablet, R-3Normal      HYDROcodone-acetaminophen (NORCO) 5-325 MG per tablet Take 1 tablet by mouth every 8 hours as needed for Pain for up to 4 days. , Disp-10 tablet, R-0Print      sodium hypochlorite (DAKINS) 0.125 % SOLN external solution Apply topically daily, Topical, DAILY Starting Thu 8/11/2022, Disp-1 each, R-1, Normal           CONTINUE these medications which have NOT CHANGED    Details   atorvastatin (LIPITOR) 40 MG tablet Take 40 mg by mouth in the morning. Historical Med      rivaroxaban (XARELTO) 20 MG TABS tablet Take 1 tablet by mouth daily (with breakfast), Disp-90 tablet, R-0Normal      acetaminophen (TYLENOL) 500 MG tablet Take 2 tablets by mouth every 8 hours for 7 days, Disp-42 tablet, R-0Normal      aspirin 81 MG EC tablet Take 1 tablet by mouth daily, Disp-30 tablet, R-3Normal      gabapentin (NEURONTIN) 300 MG capsule Take 1 capsule by mouth 3 times daily for 7 days. , Disp-21 capsule, R-0Normal      pantoprazole (PROTONIX) 40 MG tablet Take 1 tablet by mouth 2 times daily (before meals), Disp-30 tablet, R-3Normal      lisinopril (PRINIVIL;ZESTRIL) 10 MG tablet Take 1 tablet by mouth daily, Disp-30 tablet, R-3Normal      !! Misc. Devices MISC Disp-1 Device, R-0, PrintRx: Please dispense one Cane. 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 Difficulty ambulating Sig: Use cane as needed to assist in ambulating. !! Misc. Devices MISC Disp-1 Device, R-0, PrintPlease dispense a 30 day supply of the following: 4x4 gauze, Kerlix rolls, and paper tape. folic acid (FOLVITE) 1 MG tablet Take 1 mg by mouth daily. Historical Med       !! - Potential duplicate medications found. Please discuss with provider.         STOP taking these medications       sertraline (ZOLOFT) 50 MG tablet Comments:   Reason for Stopping:         hydroCHLOROthiazide (HYDRODIURIL) 25 MG tablet Comments:   Reason for Stopping:         pravastatin (PRAVACHOL) 40 MG tablet Comments:   Reason for Stopping:         polyethylene glycol (GLYCOLAX) packet Comments:   Reason for Stopping:         Gauze Pads & Dressings ([place]) MISC Comments:   Reason for Stopping:         metoprolol (LOPRESSOR) 25 MG tablet Comments:   Reason for Stopping:               Activity: activity as tolerated    Diet: diabetic diet    Follow-up:    Delores Juarez MD  [address] [place] [address],  [address], [address]  [address]  [address] [address]    Schedule an appointment as soon as possible for a visit in 2 week(s)  infec diseases    Robbie Damon MD  [facility] IM, [address] [facility] [address] [facility] [address] [phone]    Schedule an appointment as soon as possible for a visit  Hospital f/iu, admitted for osteomyelitis    [facility], [address]  [address]  [address]  [phone]    Schedule an appointment as soon as possible for a visit  PAD, osteomyelitis    Krystal Shope, [address] [facility] [address] [place] [address]  [phone]    Follow up in 1 week(s)  For wound re-check    [address] [facility],[address]  [facility] [address] [phone]  Follow up in 1 week(s)  For wound re-check      Patient Instructions:  Continue taking Zyvox for 4 weeks  Continue taking doxycycline for 4 weeks  Continue taking Lactulose  Follow up with PCP  Follow up with podiatry  Follow up with ID  Follow up with Vascular surgery  If symptoms persist or worsen would recommend coming back to the ER    Follow-up with pulmonology regarding right upper lobe and right lower lobe nodule  Note that over 30 minutes was spent in preparing discharge papers, discussing discharge with patient, medication review, etc.      Robbie Damon MD,  Internal Medicine Resident, PGY-1  [address] [facility];  [place], [place]  8/17/2022, 2:21 PM

## 2022-09-14 ENCOUNTER — TELEPHONE (OUTPATIENT)
Dept: INTERNAL MEDICINE CLINIC | Age: 57
End: 2022-09-14

## 2022-09-14 NOTE — TELEPHONE ENCOUNTER
Mailed patient no show appointment letter #1 for the date of 9/14/2022 scheduled with Dr Sangeetha Harrison

## 2022-09-14 NOTE — LETTER
ELVIRA LAUREN [address] [address]  Phone: [phone]  Fax: [phone]    Zee Campuzano MD        September 14, 2022     [address]  [address] [facility]      Dear Kaitlin Cruz: We missed seeing you for a scheduled appointment at [facility] with Zee Campuzano MD on 9/14/2022. We're sorry you were unable to keep your appointment and hope that you are doing well. We ask that you please call 24 hours in advance if you are unable to make your appointment, so that we can give that time to another patient in need. We care about you and the management of your healthcare and want to make sure that you follow up as recommended. To provide quality care and timely appointments to all our patients, you may be dismissed from the practice if you do not show for three (3) scheduled appointments within a 12-month period. We would like to continue treating your healthcare needs. Please call the office to reschedule your appointment, if needed.      Sincerely,        Zee Campuzano MD

## 2024-02-08 ENCOUNTER — APPOINTMENT (OUTPATIENT)
Dept: GENERAL RADIOLOGY | Age: 59
End: 2024-02-08
Payer: MEDICARE

## 2024-02-08 ENCOUNTER — HOSPITAL ENCOUNTER (EMERGENCY)
Age: 59
Discharge: HOME | End: 2024-02-08
Attending: EMERGENCY MEDICINE
Payer: MEDICARE

## 2024-02-08 VITALS
DIASTOLIC BLOOD PRESSURE: 75 MMHG | RESPIRATION RATE: 17 BRPM | OXYGEN SATURATION: 96 % | HEART RATE: 77 BPM | HEIGHT: 68 IN | SYSTOLIC BLOOD PRESSURE: 162 MMHG | BODY MASS INDEX: 24.25 KG/M2 | TEMPERATURE: 97.6 F | WEIGHT: 160 LBS

## 2024-02-08 DIAGNOSIS — I73.9 PVD (PERIPHERAL VASCULAR DISEASE) (HCC): Primary | ICD-10-CM

## 2024-02-08 LAB
ALBUMIN SERPL-MCNC: 4.3 G/DL (ref 3.5–5.2)
ALBUMIN/GLOB SERPL: 1 {RATIO} (ref 1–2.5)
ALP SERPL-CCNC: 83 U/L (ref 40–129)
ALT SERPL-CCNC: 10 U/L (ref 10–50)
ANION GAP SERPL CALCULATED.3IONS-SCNC: 14 MMOL/L (ref 9–16)
AST SERPL-CCNC: 31 U/L (ref 10–50)
BASOPHILS # BLD: 0.04 K/UL (ref 0–0.2)
BASOPHILS NFR BLD: 1 % (ref 0–2)
BILIRUB SERPL-MCNC: 0.3 MG/DL (ref 0–1.2)
BILIRUB UR QL STRIP: NEGATIVE
BUN SERPL-MCNC: 8 MG/DL (ref 8–23)
CALCIUM SERPL-MCNC: 9.6 MG/DL (ref 8.6–10.4)
CHLORIDE SERPL-SCNC: 102 MMOL/L (ref 98–107)
CLARITY UR: CLEAR
CO2 SERPL-SCNC: 20 MMOL/L (ref 20–31)
COLOR UR: YELLOW
COMMENT: ABNORMAL
CREAT SERPL-MCNC: 0.8 MG/DL (ref 0.7–1.2)
CRP SERPL HS-MCNC: <3 MG/L (ref 0–5)
EOSINOPHIL # BLD: 0.55 K/UL (ref 0–0.44)
EOSINOPHILS RELATIVE PERCENT: 6 % (ref 1–4)
ERYTHROCYTE [DISTWIDTH] IN BLOOD BY AUTOMATED COUNT: 15.2 % (ref 11.8–14.4)
ERYTHROCYTE [SEDIMENTATION RATE] IN BLOOD BY PHOTOMETRIC METHOD: 51 MM/HR (ref 0–20)
GFR SERPL CREATININE-BSD FRML MDRD: >60 ML/MIN/1.73M2
GLUCOSE SERPL-MCNC: 98 MG/DL (ref 74–99)
GLUCOSE UR STRIP-MCNC: NEGATIVE MG/DL
HCT VFR BLD AUTO: 41.6 % (ref 40.7–50.3)
HGB BLD-MCNC: 13.2 G/DL (ref 13–17)
HGB UR QL STRIP.AUTO: NEGATIVE
IMM GRANULOCYTES # BLD AUTO: 0.03 K/UL (ref 0–0.3)
IMM GRANULOCYTES NFR BLD: 0 %
KETONES UR STRIP-MCNC: NEGATIVE MG/DL
LACTIC ACID, SEPSIS WHOLE BLOOD: 1.3 MMOL/L (ref 0.5–1.9)
LACTIC ACID, SEPSIS WHOLE BLOOD: 1.4 MMOL/L (ref 0.5–1.9)
LEUKOCYTE ESTERASE UR QL STRIP: NEGATIVE
LYMPHOCYTES NFR BLD: 0.85 K/UL (ref 1.1–3.7)
LYMPHOCYTES RELATIVE PERCENT: 10 % (ref 24–43)
MCH RBC QN AUTO: 29.3 PG (ref 25.2–33.5)
MCHC RBC AUTO-ENTMCNC: 31.7 G/DL (ref 28.4–34.8)
MCV RBC AUTO: 92.4 FL (ref 82.6–102.9)
MONOCYTES NFR BLD: 0.94 K/UL (ref 0.1–1.2)
MONOCYTES NFR BLD: 11 % (ref 3–12)
NEUTROPHILS NFR BLD: 72 % (ref 36–65)
NEUTS SEG NFR BLD: 6.43 K/UL (ref 1.5–8.1)
NITRITE UR QL STRIP: NEGATIVE
NRBC BLD-RTO: 0 PER 100 WBC
PH UR STRIP: 6.5 [PH] (ref 5–8)
PLATELET # BLD AUTO: 225 K/UL (ref 138–453)
PMV BLD AUTO: 9.7 FL (ref 8.1–13.5)
POTASSIUM SERPL-SCNC: 4.4 MMOL/L (ref 3.7–5.3)
PROCALCITONIN SERPL-MCNC: 0.03 NG/ML (ref 0–0.09)
PROT SERPL-MCNC: 8 G/DL (ref 6.6–8.7)
PROT UR STRIP-MCNC: NEGATIVE MG/DL
RBC # BLD AUTO: 4.5 M/UL (ref 4.21–5.77)
RBC # BLD: ABNORMAL 10*6/UL
SODIUM SERPL-SCNC: 136 MMOL/L (ref 136–145)
SP GR UR STRIP: 1 (ref 1–1.03)
UROBILINOGEN UR STRIP-ACNC: NORMAL EU/DL (ref 0–1)
WBC OTHER # BLD: 8.8 K/UL (ref 3.5–11.3)

## 2024-02-08 PROCEDURE — 99285 EMERGENCY DEPT VISIT HI MDM: CPT

## 2024-02-08 PROCEDURE — 85025 COMPLETE CBC W/AUTO DIFF WBC: CPT

## 2024-02-08 PROCEDURE — 86140 C-REACTIVE PROTEIN: CPT

## 2024-02-08 PROCEDURE — 81003 URINALYSIS AUTO W/O SCOPE: CPT

## 2024-02-08 PROCEDURE — 85652 RBC SED RATE AUTOMATED: CPT

## 2024-02-08 PROCEDURE — 71045 X-RAY EXAM CHEST 1 VIEW: CPT

## 2024-02-08 PROCEDURE — 84145 PROCALCITONIN (PCT): CPT

## 2024-02-08 PROCEDURE — 87040 BLOOD CULTURE FOR BACTERIA: CPT

## 2024-02-08 PROCEDURE — 93005 ELECTROCARDIOGRAM TRACING: CPT

## 2024-02-08 PROCEDURE — 83605 ASSAY OF LACTIC ACID: CPT

## 2024-02-08 PROCEDURE — 80053 COMPREHEN METABOLIC PANEL: CPT

## 2024-02-08 PROCEDURE — 73630 X-RAY EXAM OF FOOT: CPT

## 2024-02-08 PROCEDURE — 2580000003 HC RX 258

## 2024-02-08 RX ORDER — ACETAMINOPHEN 500 MG
500 TABLET ORAL 4 TIMES DAILY PRN
Qty: 360 TABLET | Refills: 1 | Status: SHIPPED | OUTPATIENT
Start: 2024-02-08

## 2024-02-08 RX ORDER — 0.9 % SODIUM CHLORIDE 0.9 %
30 INTRAVENOUS SOLUTION INTRAVENOUS ONCE
Status: COMPLETED | OUTPATIENT
Start: 2024-02-08 | End: 2024-02-08

## 2024-02-08 RX ORDER — CLOPIDOGREL BISULFATE 75 MG/1
75 TABLET ORAL DAILY
Qty: 30 TABLET | Refills: 0 | Status: SHIPPED | OUTPATIENT
Start: 2024-02-08

## 2024-02-08 RX ORDER — ASPIRIN 81 MG/1
81 TABLET ORAL DAILY
Qty: 90 TABLET | Refills: 1 | Status: SHIPPED | OUTPATIENT
Start: 2024-02-08

## 2024-02-08 RX ADMIN — SODIUM CHLORIDE 2178 ML: 9 INJECTION, SOLUTION INTRAVENOUS at 16:43

## 2024-02-08 ASSESSMENT — ENCOUNTER SYMPTOMS
NAUSEA: 0
DIARRHEA: 0
COLOR CHANGE: 1
CHOKING: 0
SHORTNESS OF BREATH: 0
COUGH: 0

## 2024-02-08 ASSESSMENT — PAIN - FUNCTIONAL ASSESSMENT: PAIN_FUNCTIONAL_ASSESSMENT: 0-10

## 2024-02-08 ASSESSMENT — PAIN SCALES - GENERAL: PAINLEVEL_OUTOF10: 5

## 2024-02-08 ASSESSMENT — PAIN DESCRIPTION - LOCATION: LOCATION: FOOT

## 2024-02-08 NOTE — ED TRIAGE NOTES
Pt arrived to ED 45 via triage.   Pt co Rt toe pain.   Pt states that this has been going on x 1.5 weeks.   Pt has had an infection in this foot previously.   Pt is concerned for infection.   Pt denies any CP or SOB.   Pt is resting on stretcher with call light within reach.  Breathing is non labored and no acute distress is noted.   Will continue to follow plan of care

## 2024-02-08 NOTE — ED PROVIDER NOTES
[facility]     Emergency Department     Faculty Attestation    I performed a history and physical examination of the patient and discussed management with the resident. I reviewed the resident’s note and agree with the documented findings and plan of care. Any areas of disagreement are noted on the chart. I was personally present for the key portions of any procedures. I have documented in the chart those procedures where I was not present during the key portions. I have reviewed the emergency nurses triage note. I agree with the chief complaint, past medical history, past surgical history, allergies, medications, social and family history as documented unless otherwise noted below. Documentation of the HPI, Physical Exam and Medical Decision Making performed by medical students or scribes is based on my personal performance of the HPI, PE and MDM. For Physician Assistant/ Nurse Practitioner cases/documentation I have personally evaluated this patient and have completed at least one if not all key elements of the E/M (history, physical exam, and MDM). Additional findings are as noted.    Vital signs:   Vitals:    02/08/24 1516   BP: (!) 183/90   Pulse: 100   Resp: 18   Temp: (!) 96.7 °F (35.9 °C)   SpO2: 97%      Discolored right 3rd toe. DP and PT pulses are palpable. Concern for ischemia to the toe. Plan for labs, x-ray.             Marion Russell M.D,  Attending Emergency  Physician            Marion Russell MD  02/08/24 4705

## 2024-02-08 NOTE — ED PROVIDER NOTES
Care of Wanda Causey was assumed from previous attending and is being seen for Foot Pain (Pt states 3rd toe is infected on right foot )  .  The patient's initial evaluation and plan have been discussed with the prior provider who initially evaluated the patient.    Handoff taken on the following patient from prior Attending Physician:Drew 6:39 PM EST      Attestation    I was available and discussed any additional care issues that arose and coordinated the management plans with the resident(s) caring for the patient during my duty period. Any areas of disagreement with resident’s documentation of care or procedures are noted on the chart. I was personally present for the key portions of any/all procedures during my duty period. I have documented in the chart those procedures where I was not present during the key portions.      EMERGENCY DEPARTMENT COURSE / MEDICAL DECISION MAKING:       MEDICATIONS GIVEN:  Orders Placed This Encounter   Medications    sodium chloride 0.9 % bolus 2,178 mL       LABS / RADIOLOGY:     Labs Reviewed   CBC WITH AUTO DIFFERENTIAL - Abnormal; Notable for the following components:       Result Value    RDW 15.2 (*)     Neutrophils % 72 (*)     Lymphocytes % 10 (*)     Eosinophils % 6 (*)     Lymphocytes Absolute 0.85 (*)     Eosinophils Absolute 0.55 (*)     All other components within normal limits   CULTURE, BLOOD 1   CULTURE, BLOOD 2   COMPREHENSIVE METABOLIC PANEL   LACTATE, SEPSIS   LACTATE, SEPSIS   URINALYSIS WITH REFLEX TO CULTURE   PROCALCITONIN   C-REACTIVE PROTEIN   SEDIMENTATION RATE       XR FOOT RIGHT (MIN 3 VIEWS)    Result Date: 2/8/2024  EXAMINATION: THREE XRAY VIEWS OF THE RIGHT FOOT 2/8/2024 2:06 pm COMPARISON: 08/08/2022 HISTORY: ORDERING SYSTEM PROVIDED HISTORY: black discoloration of 3rd toe TECHNOLOGIST PROVIDED HISTORY: black discoloration of 3rd toe FINDINGS: Status post amputation 1st PIP joint and midportion of the proximal 2nd phalanx.  Soft tissue swelling of

## 2024-02-08 NOTE — ED PROVIDER NOTES
[facility] ED  Emergency Department Encounter  Emergency Medicine Resident     Pt Name:Wanda Causey  MRN: [id number]  Birthdate 1965  Date of evaluation: 2/8/24  PCP:  No primary care provider on file.  Note Started: 3:51 PM EST      CHIEF COMPLAINT       Chief Complaint   Patient presents with    Foot Pain     Pt states 3rd toe is infected on right foot        HISTORY OF PRESENT ILLNESS  (Location/Symptom, Timing/Onset, Context/Setting, Quality, Duration, Modifying Factors, Severity.)      Wanda Causey is a 58 y.o. male who presents with third toe of right foot pain.  Patient reports that he has been having pain in that toe for about a week and a half now has been taking some Tylenol and aspirin for the pain with minimal relief.  States that the pain is intermittent in nature, states that he only gets pain if he walks and then sits down.  He is not currently in pain.  No fevers, nausea, vomiting, chills, abdominal pain, pain extending upward, falls, loss of consciousness, lightheadedness, chest pain, shortness of breath.    Patient previously admitted on 8/8/2022 for  Also admitted on 6/16/2022 and underwent bilateral femoral endarterectomy with bovine patch angioplasty, right profunda endarterectomy with patch angioplasty, right axillary to right femoral bypass, right common femoral to left common femoral bypass, mini laparotomy to investigate the proximal infrarenal aorta on 6/23/2022    PAST MEDICAL / SURGICAL / SOCIAL / FAMILY HISTORY      has a past medical history of Alcohol abuse, CAD (coronary artery disease), Convulsions (HCC), Frostbite, and Hypertension.     has a past surgical history that includes Toe amputation and Abdominal aortic aneurysm repair (Bilateral, 6/23/2022).    Social History     Socioeconomic History    Marital status:      Spouse name: Not on file    Number of children: Not on file    Years of education: Not on file    Highest education level: Not on

## 2024-02-09 LAB
EKG ATRIAL RATE: 82 BPM
EKG P AXIS: 61 DEGREES
EKG P-R INTERVAL: 166 MS
EKG Q-T INTERVAL: 378 MS
EKG QRS DURATION: 90 MS
EKG QTC CALCULATION (BAZETT): 441 MS
EKG R AXIS: 52 DEGREES
EKG T AXIS: 61 DEGREES
EKG VENTRICULAR RATE: 82 BPM

## 2024-02-09 PROCEDURE — 93010 ELECTROCARDIOGRAM REPORT: CPT | Performed by: INTERNAL MEDICINE

## 2024-02-09 ASSESSMENT — ENCOUNTER SYMPTOMS
ABDOMINAL PAIN: 0
BACK PAIN: 0
VOMITING: 0
NAUSEA: 0
DIARRHEA: 0
SHORTNESS OF BREATH: 0
COUGH: 0

## 2024-02-09 NOTE — DISCHARGE INSTRUCTIONS
Please follow up with Dr. Euceda for continued management and care of your feet.     Take the medication as prescribed.  Please take your Aspirin and plavix.You have been provided with coupons for both of these medications. You can look up medications on [employer] for coupons for medications    Use ibuprofen or Tylenol (unless prescribed medications that have Tylenol in it) for pain.  You can take over the counter Ibuprofen (advil) tablets (4 tablets every 8 hours or 3 tablets every 6 hours or 2 tablets every 4 hours)    Return to the Emergency Department for fever > 101.5, white drainage from the wound, redness streaking, any other care or concern.

## 2024-02-09 NOTE — ED NOTES
Pt resting on stretcher. A&Ox4. RR even and unlabored. No distress noted. Pt denies any needs at this time. Call light within reach.

## 2024-02-09 NOTE — CONSULTS
VASCULAR SURGERY CONSULTATION  [facility]      Patient's Name/ Date of Birth/ Gender: Wanda Causey / 1965 (58 y.o.) / male     Referring Physician: Jessi Galdamez DO    Consulting Physician:  Dr. Delos Reyes    History of present Illness: Pt is a 58 y.o. male patient of Dr. Euceda that underwent a axillary to femoral-femoral bypass in 2002 with angioplasty of the profunda arteries.  Patient has had poor clinic follow-up and noncompliance with dual antiplatelet therapy and smoking cessation.  He presents to the emergency department as a referral from his PCP after 1-1/2 weeks of worsening right third toe pain with purple/blue discoloration at the tip.  Patient believes that he might of injured his toe on something at home and attributes this to the pain.  Patient does state that he is now unable to walk more than 20 to 30 feet before having pain in his right calf and right third toe.  Discussed that the patient's symptoms are more in line with worsening ischemia rather than traumatic injury.  Podiatry was consulted and evaluated the patient in the emergency department, imaging performed with plain films, labs drawn, no evidence of infection or osteomyelitis at this time.  Patient does have faintly palpable bilateral DP pulses with easily obtainable Doppler signals DP and PT outs to the inner webspace.  Patient has flow through the entirety of his axillary femorofemoral graft.  No palpable popliteal artery pulse.  Both feet are warm to the touch with the exception of the right third toe at the tip.  Both feet have full motor and sensation intact.    Patient is a 1 to 1.5 pack/day smoker since he was 11 years old, daily drinker with no history of withdrawal symptoms reported, however he does state he becomes shaky if he goes more than a day without drinking.  Patient denies illicit drug use.  Patient denies bleeding or clotting disorders.  Patient was supposed to be on aspirin and Plavix daily

## 2024-02-09 NOTE — CONSULTS
Infusions:  PRN Meds:.    Allergies  is allergic to shellfish-derived products.    Family History  family history includes Cancer in his mother; Heart Disease in his father.    Social History   reports that he has been smoking cigarettes. He has never used smokeless tobacco.   reports current alcohol use of about 12.0 standard drinks of alcohol per week.   reports no history of drug use.    Objective     Vitals:  Patient Vitals for the past 8 hrs:   BP Temp Temp src Pulse Resp SpO2 Height Weight   24 1940 -- -- -- 77 17 99 % -- --   24 1915 -- -- -- 83 17 97 % -- --   24 1900 -- -- -- 88 17 97 % -- --   24 1845 -- -- -- 80 18 97 % -- --   24 1828 -- 97.6 °F (36.4 °C) Oral -- -- -- -- --   24 1645 (!) 144/98 -- -- 86 17 99 % -- --   24 1516 (!) 183/90 (!) 96.7 °F (35.9 °C) Oral 100 18 97 % -- --   24 1514 -- -- -- -- -- -- 1.727 m (5' 8\") 72.6 kg (160 lb)     Average, Min, and Max for last 24 hours Vitals:  TEMPERATURE:  Temp  Av.2 °F (36.2 °C)  Min: 96.7 °F (35.9 °C)  Max: 97.6 °F (36.4 °C)    RESPIRATIONS RANGE: Resp  Av.3  Min: 17  Max: 18    PULSE RANGE: Pulse  Av.7  Min: 77  Max: 100    BLOOD PRESSURE RANGE:  Systolic (24hrs), Av , Min:144 , Max:183   ; Diastolic (24hrs), Av, Min:90, Max:98      PULSE OXIMETRY RANGE: SpO2  Av.7 %  Min: 97 %  Max: 99 %  I&O:  No intake/output data recorded.    CBC:  Recent Labs     24  1646   WBC 8.8   HGB 13.2   HCT 41.6      CRP <3.0        BMP:  Recent Labs     24  1646      K 4.4      CO2 20   BUN 8   CREATININE 0.8   GLUCOSE 98   CALCIUM 9.6        Coags:  Recent Labs     24  1646   PROT 8.0       No results found for: \"LABA1C\"  Lab Results   Component Value Date    SEDRATE 51 (H) 2024    SEDRATE 53 (H) 2022    SEDRATE 35 (H) 2022    SEDRATE 32 (H) 2015     Lab Results   Component Value Date    CRP <3.0 2024    CRP 4.5 2022     detail with the patient.  Vascular surgery will be consulted after getting ABIs.   NIVS ordered due to diminished peripheral pulses.  No debridement was performed at this time.  Right third digit was dressed with Betadine soaked gauze, DSD and mild Ace wrap   We will await JIMBO results at this time  WBAT to Bilateral lower extremity.  Discussed with  Dr. Mcdaniel.      Jose Rafael Lomax DPM   Podiatric Medicine & Surgery   2/8/2024 at 8:47 PM      This note is created with the assistance of a speech recognition program.  While intending to generate a document that actually reflects the content of the visit, the document can still have some errors including those of syntax and sound a like substitutions which may escape proof reading.  In such instances, actual meaning can be extrapolated by contextual diversion.    I performed a history and physical examination of the patient and discussed management with the resident. I reviewed the resident’s note and agree with the documented findings and plan of care. Any areas of disagreement are noted on the chart. I was personally present for the key portions of any procedures. I have documented in the chart those procedures where I was not present during the key portions. I have reviewed the Podiatry Resident progress note. I agree with the chief complaint, past medical history, past surgical history, allergies, medications, social and family history as documented unless otherwise noted below. Documentation of the HPI, Physical Exam and Medical Decision Making performed by medical students or scribes is based on my personal performance of the HPI, PE and MDM. I have personally evaluated this patient and have completed at least one if not all key elements of the E/M (history, physical exam, and MDM). Additional findings are as noted.     Pooja Mcdaniel DPM on 2/9/2024 at 11:24 AM  Board Certified, American Board of Podiatric Surgery  Fellow, [employer]

## 2024-02-11 LAB
MICROORGANISM SPEC CULT: NORMAL
MICROORGANISM SPEC CULT: NORMAL
SERVICE CMNT-IMP: NORMAL
SERVICE CMNT-IMP: NORMAL
SPECIMEN DESCRIPTION: NORMAL
SPECIMEN DESCRIPTION: NORMAL

## 2025-02-28 ENCOUNTER — TRANSCRIBE ORDERS (OUTPATIENT)
Dept: ADMINISTRATIVE | Age: 60
End: 2025-02-28

## 2025-02-28 DIAGNOSIS — R07.9 CHEST PAIN, UNSPECIFIED TYPE: Primary | ICD-10-CM

## (undated) DEVICE — SUTURE ETHBND EXCEL SZ 2-0 L30IN NONABSORBABLE GRN CT1 X423H

## (undated) DEVICE — FOGARTY - HYDRAGRIP SURGICAL - CLAMP INSERTS: Brand: FOGARTY SOFTJAW

## (undated) DEVICE — CONTAINER,SPECIMEN,4OZ,OR STRL: Brand: MEDLINE

## (undated) DEVICE — SUTURE VCRL + SZ 3-0 L27IN ABSRB WHT CT-1 1/2 CIR VCP258H

## (undated) DEVICE — SUTURE NONABSORBABLE MONOFILAMENT 6-0 C-1 1X30 IN PROLENE 8706H

## (undated) DEVICE — APPLICATOR MEDICATED 10.5 CC SOLUTION HI LT ORNG CHLORAPREP

## (undated) DEVICE — SUTURE PROL SZ 6-0 L24IN NONABSORBABLE BLU L9.3MM BV-1 3/8 8805H

## (undated) DEVICE — 4F 80 CM LEMAITRE EMBOLECTOMY CATHETER: Brand: LEMAITRE EMBOLECTOMY CATHETER

## (undated) DEVICE — INTENDED FOR TISSUE SEPARATION, AND OTHER PROCEDURES THAT REQUIRE A SHARP SURGICAL BLADE TO PUNCTURE OR CUT.: Brand: BARD-PARKER ® CARBON RIB-BACK BLADES

## (undated) DEVICE — SUTURE ABSORBABLE BRAIDED 2-0 CT-1 27 IN UD VICRYL J259H

## (undated) DEVICE — SUTURE PROL SZ 3-0 L48IN NONABSORBABLE BLU SH L26MM 1/2 CIR 8534H

## (undated) DEVICE — Device

## (undated) DEVICE — SUTURE ABSORBABLE MONOFILAMENT 0 TP1 60 IN VIO PDS + PDP991G

## (undated) DEVICE — APPLICATOR MEDICATED 26 CC SOLUTION HI LT ORNG CHLORAPREP

## (undated) DEVICE — GOWN,SIRUS,NONRNF,SETINSLV,XL,20/CS: Brand: MEDLINE

## (undated) DEVICE — SUTURE NONABSORBABLE MONOFILAMENT 7-0 BV-1 1X24 IN PROLENE 8702H

## (undated) DEVICE — TOTAL TRAY, 16FR 10ML SIL FOLEY, URN: Brand: MEDLINE

## (undated) DEVICE — GAUZE,SPONGE,4"X4",16PLY,XRAY,STRL,LF: Brand: MEDLINE

## (undated) DEVICE — GOWN,SIRUS,POLYRNF,XLN/3XL,18/CS: Brand: MEDLINE

## (undated) DEVICE — SUTURE ETHLN SZ 4-0 L18IN NONABSORBABLE BLK L19MM PS-2 3/8 1667H

## (undated) DEVICE — SUTURE VCRL + SZ 2-0 L27IN ABSRB CLR CT-1 1/2 CIR TAPERCUT VCP259H

## (undated) DEVICE — DRAIN SURG PENROSE 0.5X18 IN CLOSED WND DRAINAGE PREM SIL

## (undated) DEVICE — 3M™ IOBAN™ 2 ANTIMICROBIAL INCISE DRAPE 6651EZ: Brand: IOBAN™ 2

## (undated) DEVICE — GLOVE SURG SZ 75 L12IN FNGR THK79MIL GRN LTX FREE

## (undated) DEVICE — FOGARTY - HYDRAGRIP SURGICAL - CLAMP INSERTS: Brand: FOGARTY HYDRAJAW

## (undated) DEVICE — SUTURE PROL SZ 5-0 L36IN NONABSORBABLE BLU L13MM C-1 3/8 8720H

## (undated) DEVICE — TUBING SUCT 12FR MAL ALUM SHFT FN CAP VENT UNIV CONN W/ OBT

## (undated) DEVICE — CATHETER,URETHRAL,REDRUBBER,STRL,18FR: Brand: MEDLINE

## (undated) DEVICE — CATH ALL PURPOSE 18FR RUBBER 12/CT RED

## (undated) DEVICE — SUTURE N ABSRB MONOFILAMENT 5-0 CV-5 TH-18 36 IN GORTX 5N04A

## (undated) DEVICE — GOWN,AURORA,NONREINFORCED,LARGE: Brand: MEDLINE

## (undated) DEVICE — GLOVE SURG SZ 7 L12IN FNGR THK79MIL GRN LTX FREE

## (undated) DEVICE — TOWEL,OR,DSP,ST,BLUE,DLX,XR,4/PK,20PK/CS: Brand: MEDLINE

## (undated) DEVICE — SVMMC VASC MAJ PK

## (undated) DEVICE — GLOVE SURG SZ 75 CRM LTX FREE POLYISOPRENE POLYMER BEAD ANTI

## (undated) DEVICE — GLOVE SURG SZ 65 L12IN FNGR THK79MIL GRN LTX FREE

## (undated) DEVICE — COVER,LIGHT HANDLE,FLX,2/PK: Brand: MEDLINE INDUSTRIES, INC.

## (undated) DEVICE — SPONGE LAP W18XL18IN WHT COT 4 PLY FLD STRUNG RADPQ DISP ST

## (undated) DEVICE — GLOVE SURG SZ 7.5 L11.73IN FNGR THK9.8MIL STRW LTX POLYMER

## (undated) DEVICE — SUTURE PERMAHAND SZ 3-0 L30IN NONABSORBABLE BLK SH L26MM K832H

## (undated) DEVICE — DECANTER BAG 9": Brand: MEDLINE INDUSTRIES, INC.

## (undated) DEVICE — SUTURE PERMAHAND SZ 4-0 L18IN NONABSORBABLE BLK SILK BRAID A183H

## (undated) DEVICE — PAD N ADH W3XL4IN POLY COT SFT PERF FLM EASILY CUT ABSRB

## (undated) DEVICE — TAPE UMB 72X7/32 IN

## (undated) DEVICE — GLOVE SURG SZ 8 CRM LTX FREE POLYISOPRENE POLYMER BEAD ANTI

## (undated) DEVICE — BLADE ES L6IN ELASTOMERIC COAT EXT DURABLE BEND UPTO 90DEG

## (undated) DEVICE — LOOP VES W13MM THK09MM MINI RED SIL FLD REPELLENT

## (undated) DEVICE — DRAPE, SLUSH XL, 44X66, STERILE: Brand: MEDLINE

## (undated) DEVICE — CATHETER,URETHRAL,REDRUBBER,STRL,14FR: Brand: MEDLINE INDUSTRIES, INC.